# Patient Record
Sex: MALE | Race: WHITE | Employment: OTHER | URBAN - METROPOLITAN AREA
[De-identification: names, ages, dates, MRNs, and addresses within clinical notes are randomized per-mention and may not be internally consistent; named-entity substitution may affect disease eponyms.]

---

## 2021-03-08 ENCOUNTER — HOSPITAL ENCOUNTER (OUTPATIENT)
Dept: MRI IMAGING | Age: 61
Discharge: HOME | End: 2021-03-10
Payer: MEDICARE

## 2021-03-08 DIAGNOSIS — S06.5XAA SDH (SUBDURAL HEMATOMA): ICD-10-CM

## 2021-03-08 DIAGNOSIS — G91.2 NPH (NORMAL PRESSURE HYDROCEPHALUS) (HCC): ICD-10-CM

## 2021-03-08 PROCEDURE — 70553 MRI BRAIN STEM W/O & W/DYE: CPT

## 2021-03-08 PROCEDURE — A9585 GADOBUTROL INJECTION: HCPCS | Performed by: RADIOLOGY

## 2021-03-08 PROCEDURE — 6360000004 HC RX CONTRAST MEDICATION: Performed by: RADIOLOGY

## 2021-03-08 RX ADMIN — GADOBUTROL 20 ML: 604.72 INJECTION INTRAVENOUS at 14:49

## 2021-08-18 ENCOUNTER — HOSPITAL ENCOUNTER (OUTPATIENT)
Age: 61
Discharge: HOME | End: 2021-08-20

## 2021-08-18 PROCEDURE — 88305 TISSUE EXAM BY PATHOLOGIST: CPT

## 2021-10-29 ENCOUNTER — HOSPITAL ENCOUNTER (OUTPATIENT)
Age: 61
Discharge: HOME | End: 2021-10-31

## 2021-10-30 LAB — CLOTEST: NORMAL

## 2021-12-07 ENCOUNTER — TELEPHONE (OUTPATIENT)
Dept: CARDIOLOGY | Age: 61
End: 2021-12-07

## 2021-12-09 ENCOUNTER — TELEPHONE (OUTPATIENT)
Dept: CARDIOLOGY | Age: 61
End: 2021-12-09

## 2021-12-09 NOTE — TELEPHONE ENCOUNTER
Spoke with patient and confirmed Lexiscan stress test on Dec. 13, 2021 at 0700. Instructions for test and COVID-19 preprocedure checklist reviewed with patient, questions answered.

## 2021-12-13 ENCOUNTER — HOSPITAL ENCOUNTER (OUTPATIENT)
Dept: CARDIOLOGY | Age: 61
Discharge: HOME | End: 2021-12-13
Payer: MEDICARE

## 2021-12-13 VITALS
HEIGHT: 68 IN | WEIGHT: 242 LBS | BODY MASS INDEX: 36.68 KG/M2 | SYSTOLIC BLOOD PRESSURE: 112 MMHG | DIASTOLIC BLOOD PRESSURE: 62 MMHG

## 2021-12-13 DIAGNOSIS — R07.89 OTHER CHEST PAIN: Primary | ICD-10-CM

## 2021-12-13 PROCEDURE — 93017 CV STRESS TEST TRACING ONLY: CPT

## 2021-12-13 PROCEDURE — 6360000002 HC RX W HCPCS: Performed by: FAMILY MEDICINE

## 2021-12-13 PROCEDURE — A9500 TC99M SESTAMIBI: HCPCS | Performed by: INTERNAL MEDICINE

## 2021-12-13 PROCEDURE — 78452 HT MUSCLE IMAGE SPECT MULT: CPT

## 2021-12-13 PROCEDURE — 2580000003 HC RX 258: Performed by: INTERNAL MEDICINE

## 2021-12-13 PROCEDURE — 3430000000 HC RX DIAGNOSTIC RADIOPHARMACEUTICAL: Performed by: INTERNAL MEDICINE

## 2021-12-13 RX ORDER — SODIUM CHLORIDE 0.9 % (FLUSH) 0.9 %
10 SYRINGE (ML) INJECTION PRN
Status: DISCONTINUED | OUTPATIENT
Start: 2021-12-13 | End: 2021-12-14 | Stop reason: HOSPADM

## 2021-12-13 RX ADMIN — Medication 10.4 MILLICURIE: at 07:21

## 2021-12-13 RX ADMIN — SODIUM CHLORIDE, PRESERVATIVE FREE 10 ML: 5 INJECTION INTRAVENOUS at 07:21

## 2021-12-13 RX ADMIN — Medication 34 MILLICURIE: at 08:40

## 2021-12-13 RX ADMIN — SODIUM CHLORIDE, PRESERVATIVE FREE 10 ML: 5 INJECTION INTRAVENOUS at 08:41

## 2021-12-13 RX ADMIN — REGADENOSON 0.4 MG: 0.08 INJECTION, SOLUTION INTRAVENOUS at 08:40

## 2021-12-13 RX ADMIN — SODIUM CHLORIDE, PRESERVATIVE FREE 10 ML: 5 INJECTION INTRAVENOUS at 08:39

## 2022-01-21 ENCOUNTER — HOSPITAL ENCOUNTER (OUTPATIENT)
Dept: CARDIOLOGY | Age: 62
Discharge: HOME | End: 2022-01-21
Payer: MEDICARE

## 2022-01-21 LAB
LV EF: 30 %
LVEF MODALITY: NORMAL

## 2022-01-21 PROCEDURE — 93306 TTE W/DOPPLER COMPLETE: CPT | Performed by: PSYCHIATRY & NEUROLOGY

## 2022-01-21 PROCEDURE — 2580000003 HC RX 258: Performed by: FAMILY MEDICINE

## 2022-01-21 PROCEDURE — 6360000004 HC RX CONTRAST MEDICATION: Performed by: FAMILY MEDICINE

## 2022-01-21 RX ORDER — SODIUM CHLORIDE 0.9 % (FLUSH) 0.9 %
10 SYRINGE (ML) INJECTION PRN
Status: DISCONTINUED | OUTPATIENT
Start: 2022-01-21 | End: 2022-01-22 | Stop reason: HOSPADM

## 2022-01-21 RX ADMIN — PERFLUTREN 1.1 MG: 6.52 INJECTION, SUSPENSION INTRAVENOUS at 08:15

## 2022-01-21 RX ADMIN — SODIUM CHLORIDE, PRESERVATIVE FREE 10 ML: 5 INJECTION INTRAVENOUS at 08:15

## 2022-01-21 RX ADMIN — SODIUM CHLORIDE, PRESERVATIVE FREE 10 ML: 5 INJECTION INTRAVENOUS at 08:20

## 2022-03-10 ENCOUNTER — OFFICE VISIT (OUTPATIENT)
Dept: CARDIOLOGY CLINIC | Age: 62
End: 2022-03-10
Payer: MEDICARE

## 2022-03-10 VITALS
DIASTOLIC BLOOD PRESSURE: 76 MMHG | SYSTOLIC BLOOD PRESSURE: 124 MMHG | BODY MASS INDEX: 37.66 KG/M2 | RESPIRATION RATE: 18 BRPM | HEART RATE: 81 BPM | HEIGHT: 68 IN | WEIGHT: 248.5 LBS

## 2022-03-10 DIAGNOSIS — R94.39 ABNORMAL STRESS TEST: Primary | ICD-10-CM

## 2022-03-10 DIAGNOSIS — R07.89 OTHER CHEST PAIN: Primary | ICD-10-CM

## 2022-03-10 PROCEDURE — 93000 ELECTROCARDIOGRAM COMPLETE: CPT | Performed by: INTERNAL MEDICINE

## 2022-03-10 PROCEDURE — 99205 OFFICE O/P NEW HI 60 MIN: CPT | Performed by: INTERNAL MEDICINE

## 2022-03-10 RX ORDER — METOPROLOL SUCCINATE 50 MG/1
50 TABLET, EXTENDED RELEASE ORAL DAILY
Qty: 30 TABLET | Refills: 0 | Status: SHIPPED
Start: 2022-03-10 | End: 2022-05-10

## 2022-03-10 RX ORDER — METOPROLOL SUCCINATE 50 MG/1
50 TABLET, EXTENDED RELEASE ORAL DAILY
Qty: 30 TABLET | Refills: 5 | Status: SHIPPED
Start: 2022-03-10 | End: 2022-03-10 | Stop reason: SDUPTHER

## 2022-03-10 RX ORDER — ASPIRIN 81 MG/1
81 TABLET ORAL DAILY
Qty: 90 TABLET | Refills: 3 | Status: SHIPPED
Start: 2022-03-10 | End: 2022-04-12 | Stop reason: ALTCHOICE

## 2022-03-10 RX ORDER — FAMOTIDINE 40 MG/1
40 TABLET, FILM COATED ORAL DAILY
COMMUNITY

## 2022-03-10 RX ORDER — PANTOPRAZOLE SODIUM 40 MG/1
40 TABLET, DELAYED RELEASE ORAL DAILY
COMMUNITY
End: 2022-04-12

## 2022-03-10 NOTE — PATIENT INSTRUCTIONS
Your stress test and echocardiogram showed you likely had a heart attack in the past and have developed a cardiomyopathy (weakening of the heart muscle) likely due to blockages in the coronary arteries. This makes you prone to developing congestive heart failure. We will arrange a heart catheterization to see if you have blockages and how to best fix them.     We need to change your blood pressure and heart medications.  -Stop amlodipinebenazepril and benazepril pills  -Start metoprolol succinate 50 mg daily  -If you tolerate that, we will start Entresto  -You need to be on low-dose aspirin  -You need to quit smoking

## 2022-03-10 NOTE — PROGRESS NOTES
OUTPATIENT CARDIOLOGY CONSULT    Name: Tiarra Hammer    Age: 64 y.o. Date of Service: 3/10/2022    Reason for Consultation: Cardiomyopathy    Referring Physician: Stephanie Guerin MD    History of Present Illness:  Tiarra Hammer is a 64 y.o. male who presents today for further evaluation of newly discovered cardiomyopathy. Denies any known history of coronary artery disease but has a multitude of risk factors. Has chronic heartburn for years but denies exertional chest pain. Underwent stress test in December showing fixed inferior defect and EF in the 20s and subsequent echo showing inferior hypokinesis EF in the 30s. Denies any prior knowledge of treatment for MI. Reports exertional dyspnea but is not very ambulatory due to chronic neck and back issues. He gets heartburn daily, can be triggered by water but not typically with exertion. Denies palpitations, lower extremity edema, syncope. Smokes a pack of cigarettes daily. States is sensitive medications and intolerant to statin as well as aspirin. Review of Systems:  Complete review of systems otherwise negative except as described above. 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 .. HAS F/U ON 6/21/12 WITH DR. JIMENEZ    Hernia     Hypertension     Low back pain     Osteolytic lesion     Panic anxiety syndrome     Sleep apnea     awaiting [employer] back ordered       Past Surgical History:  Past Surgical History:   Procedure Laterality Date    BACK SURGERY  8/10/11    DR. Robbie Paz AT [facility]. .. FUSION    BRAIN SURGERY      CRANIECTOMY  3/22/10    RIGHT TEMPORAL REGION    CRANIOTOMY  5/10/10 [address] [facility]    DR. RIVERS       Family History:  Family History   Problem Relation Age of Onset    Heart Disease Mother     Prostate Cancer Father        Social History:  Social History     Tobacco Use    Smoking status: Current Every Day Smoker     Packs/day: 1.00     Types: Cigarettes    Smokeless tobacco: Never Used   Vaping Use    Vaping Use: Never used   Substance Use Topics    Alcohol use: Yes     Comment: DRINKS BEER WEEKLY; UNKNOWN AMOUNT    Drug use: No        Allergies:   Allergies   Allergen Reactions    Adhesive Tape Rash       Current Medications:    Current Outpatient Medications:     famotidine (PEPCID) 40 MG tablet, Take 40 mg by mouth daily, Disp: , Rfl:     pantoprazole (PROTONIX) 40 MG tablet, Take 40 mg by mouth daily, Disp: , Rfl:     metoprolol succinate (TOPROL XL) 50 MG extended release tablet, Take 1 tablet by mouth daily, Disp: 30 tablet, Rfl: 5    aspirin EC 81 MG EC tablet, Take 1 tablet by mouth daily, Disp: 90 tablet, Rfl: 3    tiZANidine (ZANAFLEX) 4 MG tablet, Take 4 mg by mouth every 6 hours as needed, Disp: , Rfl:     zolpidem (AMBIEN) 10 MG tablet, Take by mouth nightly as needed for Sleep., Disp: , Rfl:     metFORMIN (GLUCOPHAGE-XR) 500 MG extended release tablet, 1,000 mg daily (with breakfast) , Disp: , Rfl:     ezetimibe (ZETIA) 10 MG tablet, 10 mg daily , Disp: , Rfl:     Probiotic Product (SOLUBLE FIBER/PROBIOTICS PO), Take by mouth, Disp: , Rfl:     ranitidine (ZANTAC) 300 MG capsule, Take 300 mg by mouth every evening (Patient not taking: Reported on 3/10/2022), Disp: , Rfl:     Physical Exam:  /76   Pulse 81   Resp 18   Ht 5' 8\" (1.727 m)   Wt 248 lb 8 oz (112.7 kg)   BMI 37.78 kg/m²   Wt Readings from Last 3 Encounters:   03/10/22 248 lb 8 oz (112.7 kg)   12/13/21 242 lb (109.8 kg)   12/19/19 242 lb (109.8 kg)     Appearance: Overweight male, awake, alert, no acute respiratory distress  Skin: Intact, no rash  Eyes: EOMI, no conjunctival erythema  ENMT: Moist mucous membranes. Neck: Supple, no elevated JVP, no carotid bruits  Lungs: Clear to auscultation bilaterally. No wheezes, rales, or rhonchi. Cardiac: Regular rhythm with a normal rate. S1 & S2 normal, no murmurs  Abdomen: Soft, nontender, +bowel sounds  Extremities: Moves all extremities x 4, no lower extremity edema  Neurologic: No focal motor deficits apparent, normal mood and affect  Peripheral Pulses: Intact posterior tibial pulses bilaterally    Laboratory Tests:  Lab Results   Component Value Date    CREATININE 0.78 11/17/2017    BUN 8 11/17/2017     06/04/2013    K 4.2 06/04/2013     06/04/2013    CO2 27 06/04/2013     Lab Results   Component Value Date    MG 2.0 12/17/2010     Lab Results   Component Value Date    WBC 7.4 06/04/2013    HGB 16.5 06/04/2013    HCT 50.0 06/04/2013    MCV 99.7 06/04/2013     06/04/2013     Lab Results   Component Value Date    ALT 39 06/04/2013    AST 33 06/04/2013    ALKPHOS 74 06/04/2013    BILITOT 0.3 06/04/2013     Lab Results   Component Value Date    CKTOTAL 73 12/18/2010    CKMB 1.1 12/18/2010    TROPONINI <0.01 01/19/2015    TROPONINI 0.02 12/18/2010    TROPONINI 0.01 12/17/2010     Lab Results   Component Value Date    INR 1.0 04/21/2011    INR 1.0 12/17/2010    PROTIME 11.6 04/21/2011    PROTIME 11.4 12/17/2010     No results found for: TSHFT4, TSH  No results found for: LABA1C  No results found for: EAG  Lab Results   Component Value Date    CHOL 213 (H) 12/18/2010     Lab Results   Component Value Date    TRIG 191 (H) 12/18/2010     Lab Results   Component Value Date    HDL 40.0 (A) 12/18/2010     Lab Results   Component Value Date    LDLCALC 135 (H) 12/18/2010     No results found for: LABVLDL, VLDL  No results found for: CHOLHDLRATIO  No results for input(s): PROBNP in the last 72 hours. Cardiac Tests:  ECG:   3/10/2022: Sinus rhythm 81 beats minute with PACs.   Normal axis and intervals. Poor R wave progression possible prior anterior infarct. Nonspecific T wave changes. Echocardiogram:   TTE 1/21/22 Merry Odonnell   Summary   difficult visualization. Contrast used. moderate global hypokinesis. Inferior akinesis. Severely reduced left ventricular systolic function. EF 30%   Stage I diastolic dysfunction. Trace to mild mitral regurgitation. Stress test:    Pharm stress 12/13/21  Gated SPECT left ventricular ejection fraction was calculated to be 25%, with inferior hypokinesis.     Impression:    1. ECG during the infusion did not change. 2. The myocardial perfusion imaging was abnormal.    The abnormality was a a large sized fixed defect in the inferior wall suggestive of a prior MI     3. Overall left ventricular systolic function was abnormal with regional wall motion abnormalities. 4. Intermediate risk general pharmacologic stress test.      Cardiac catheterization: na    Orders Placed This Encounter   Procedures    EKG 12 Lead        Requested Prescriptions     Signed Prescriptions Disp Refills    metoprolol succinate (TOPROL XL) 50 MG extended release tablet 30 tablet 5     Sig: Take 1 tablet by mouth daily    aspirin EC 81 MG EC tablet 90 tablet 3     Sig: Take 1 tablet by mouth daily        ASSESSMENT / PLAN:  1. Cardiomyopathy, most likely ischemic.  EF 25% by SPECT, 30% by echo  2. Presumed CAD. Abnormal stress with fixed inferior defect, no ischemia  3. Chronic HFrEF. He is euvolemic  4. Hypertension, well controlled  5. Type 2 diabetes, on Metformin  6. Hyperlipidemia, on ezetimibe. Statin intolerant  7. NISH, untreated  8. History of cerebral meningioma status post surgical resection  9. GERD  10. Chronic neck pain, back pain  11. Obesity  12.  Tobacco abuse    Recommendations:  Patient presenting with new onset of cardiomyopathy with no prior knowledge of a prior MI.  EF significantly reduced, he does not appear hypervolemic and denies anginal symptoms though he is quite sedentary, and his dyspnea with exertion or \"heartburn\" may be anginal equivalents. · Recommend left heart catheterization and coronary angiogram to confirm ischemic cardiomyopathy and to assess suitability for revascularization  · Optimize guideline directed medical therapy for cardiomyopathy; he is very hesitant to change any medications due to intolerance, thus will proceed slowly  · Discontinue amlodipine benazepril and benazepril  · Start metoprolol succinate 50 mg daily  · If tolerates, will start sacubitril/valsartan  · Currently euvolemic, diuretics as needed  · Start baby dose enteric-coated aspirin  · Recommend statin, however patient intolerant  · Needs to quit smoking  · Increase physical activity as able  · Aggressive risk factor modification  · Follow-up in 1 month or sooner if need arises    Risks and benefits of left heart catheterization explained to patient, including risk of MI, CVA, death, bleeding complications, vascular injury, renal failure requiring dialysis, and requiring emergency surgery. Possible outcomes including need for medical management, PCI, bypass surgery explained to patient. Patient voiced understanding and agrees to proceed. AUC criteria: HF 7    Greater than 60 minutes spent on this encounter. Above explained in detail to the patient and all questions answered    Thank you Edvin for allowing me to participate in your patient's care. Please feel free to contact me if you have any questions or concerns.     Brittany Huber MD, [facility] SPECIALTY \[facility]\"" [employer] [facility] Cardiology

## 2022-03-15 ENCOUNTER — TELEPHONE (OUTPATIENT)
Dept: CARDIAC CATH/INVASIVE PROCEDURES | Age: 62
End: 2022-03-15

## 2022-03-15 DIAGNOSIS — R07.89 OTHER CHEST PAIN: ICD-10-CM

## 2022-03-15 LAB
ANION GAP SERPL CALCULATED.3IONS-SCNC: 13 MMOL/L (ref 7–16)
BASOPHILS ABSOLUTE: 0.04 E9/L (ref 0–0.2)
BASOPHILS RELATIVE PERCENT: 0.5 % (ref 0–2)
BUN BLDV-MCNC: 12 MG/DL (ref 6–23)
CALCIUM SERPL-MCNC: 9.2 MG/DL (ref 8.6–10.2)
CHLORIDE BLD-SCNC: 96 MMOL/L (ref 98–107)
CO2: 24 MMOL/L (ref 22–29)
CREAT SERPL-MCNC: 0.8 MG/DL (ref 0.7–1.2)
EOSINOPHILS ABSOLUTE: 0.12 E9/L (ref 0.05–0.5)
EOSINOPHILS RELATIVE PERCENT: 1.5 % (ref 0–6)
GFR AFRICAN AMERICAN: >60
GFR NON-AFRICAN AMERICAN: >60 ML/MIN/1.73
GLUCOSE BLD-MCNC: 211 MG/DL (ref 74–99)
HCT VFR BLD CALC: 45.9 % (ref 37–54)
HEMOGLOBIN: 15.3 G/DL (ref 12.5–16.5)
IMMATURE GRANULOCYTES #: 0.07 E9/L
IMMATURE GRANULOCYTES %: 0.9 % (ref 0–5)
INR BLD: 1.1
LYMPHOCYTES ABSOLUTE: 1.99 E9/L (ref 1.5–4)
LYMPHOCYTES RELATIVE PERCENT: 25.1 % (ref 20–42)
MCH RBC QN AUTO: 32.9 PG (ref 26–35)
MCHC RBC AUTO-ENTMCNC: 33.3 % (ref 32–34.5)
MCV RBC AUTO: 98.7 FL (ref 80–99.9)
MONOCYTES ABSOLUTE: 0.66 E9/L (ref 0.1–0.95)
MONOCYTES RELATIVE PERCENT: 8.3 % (ref 2–12)
NEUTROPHILS ABSOLUTE: 5.04 E9/L (ref 1.8–7.3)
NEUTROPHILS RELATIVE PERCENT: 63.7 % (ref 43–80)
PDW BLD-RTO: 12.4 FL (ref 11.5–15)
PLATELET # BLD: 246 E9/L (ref 130–450)
PMV BLD AUTO: 11 FL (ref 7–12)
POTASSIUM SERPL-SCNC: 4.4 MMOL/L (ref 3.5–5)
PROTHROMBIN TIME: 11.7 SEC (ref 9.3–12.4)
RBC # BLD: 4.65 E12/L (ref 3.8–5.8)
SODIUM BLD-SCNC: 133 MMOL/L (ref 132–146)
WBC # BLD: 7.9 E9/L (ref 4.5–11.5)

## 2022-03-15 NOTE — TELEPHONE ENCOUNTER
Reminded patient of scheduled procedure on 3/16. Instructions given and COVID questionnaire completed.

## 2022-03-16 ENCOUNTER — HOSPITAL ENCOUNTER (INPATIENT)
Dept: CARDIAC CATH/INVASIVE PROCEDURES | Age: 62
LOS: 1 days | Discharge: HOME | DRG: 247 | End: 2022-03-17
Attending: INTERNAL MEDICINE | Admitting: INTERNAL MEDICINE
Payer: MEDICARE

## 2022-03-16 DIAGNOSIS — I25.10 CAD IN NATIVE ARTERY: ICD-10-CM

## 2022-03-16 LAB
ABO/RH: NORMAL
ANTIBODY SCREEN: NORMAL
METER GLUCOSE: 100 MG/DL (ref 74–99)
POC ACT LR: 223 SECONDS
POC ACT LR: 245 SECONDS
POC ACT LR: 263 SECONDS
POC ACT LR: 302 SECONDS

## 2022-03-16 PROCEDURE — 92928 PRQ TCAT PLMT NTRAC ST 1 LES: CPT | Performed by: INTERNAL MEDICINE

## 2022-03-16 PROCEDURE — 6370000000 HC RX 637 (ALT 250 FOR IP)

## 2022-03-16 PROCEDURE — 4A023N7 MEASUREMENT OF CARDIAC SAMPLING AND PRESSURE, LEFT HEART, PERCUTANEOUS APPROACH: ICD-10-PCS | Performed by: INTERNAL MEDICINE

## 2022-03-16 PROCEDURE — C1769 GUIDE WIRE: HCPCS

## 2022-03-16 PROCEDURE — B2111ZZ FLUOROSCOPY OF MULTIPLE CORONARY ARTERIES USING LOW OSMOLAR CONTRAST: ICD-10-PCS | Performed by: INTERNAL MEDICINE

## 2022-03-16 PROCEDURE — C1874 STENT, COATED/COV W/DEL SYS: HCPCS

## 2022-03-16 PROCEDURE — 86901 BLOOD TYPING SEROLOGIC RH(D): CPT

## 2022-03-16 PROCEDURE — C1725 CATH, TRANSLUMIN NON-LASER: HCPCS

## 2022-03-16 PROCEDURE — 2580000003 HC RX 258: Performed by: INTERNAL MEDICINE

## 2022-03-16 PROCEDURE — 6370000000 HC RX 637 (ALT 250 FOR IP): Performed by: INTERNAL MEDICINE

## 2022-03-16 PROCEDURE — 36415 COLL VENOUS BLD VENIPUNCTURE: CPT

## 2022-03-16 PROCEDURE — C9607 PERC D-E COR REVASC CHRO SIN: HCPCS

## 2022-03-16 PROCEDURE — 2140000000 HC CCU INTERMEDIATE R&B

## 2022-03-16 PROCEDURE — 93458 L HRT ARTERY/VENTRICLE ANGIO: CPT | Performed by: INTERNAL MEDICINE

## 2022-03-16 PROCEDURE — 6360000002 HC RX W HCPCS

## 2022-03-16 PROCEDURE — 86850 RBC ANTIBODY SCREEN: CPT

## 2022-03-16 PROCEDURE — C1887 CATHETER, GUIDING: HCPCS

## 2022-03-16 PROCEDURE — C1894 INTRO/SHEATH, NON-LASER: HCPCS

## 2022-03-16 PROCEDURE — C9608 PERC D-E COR REVASC CHRO ADD: HCPCS

## 2022-03-16 PROCEDURE — 93458 L HRT ARTERY/VENTRICLE ANGIO: CPT

## 2022-03-16 PROCEDURE — 82962 GLUCOSE BLOOD TEST: CPT

## 2022-03-16 PROCEDURE — 86900 BLOOD TYPING SEROLOGIC ABO: CPT

## 2022-03-16 PROCEDURE — 2500000003 HC RX 250 WO HCPCS

## 2022-03-16 PROCEDURE — 85347 COAGULATION TIME ACTIVATED: CPT

## 2022-03-16 PROCEDURE — 2709999900 HC NON-CHARGEABLE SUPPLY

## 2022-03-16 PROCEDURE — 027236Z DILATION OF CORONARY ARTERY, THREE ARTERIES WITH THREE DRUG-ELUTING INTRALUMINAL DEVICES, PERCUTANEOUS APPROACH: ICD-10-PCS | Performed by: INTERNAL MEDICINE

## 2022-03-16 PROCEDURE — 93571 IV DOP VEL&/PRESS C FLO 1ST: CPT

## 2022-03-16 PROCEDURE — 93571 IV DOP VEL&/PRESS C FLO 1ST: CPT | Performed by: INTERNAL MEDICINE

## 2022-03-16 RX ORDER — ASPIRIN 81 MG/1
324 TABLET, CHEWABLE ORAL ONCE
Status: DISCONTINUED | OUTPATIENT
Start: 2022-03-16 | End: 2022-03-16

## 2022-03-16 RX ORDER — ASPIRIN 81 MG/1
81 TABLET ORAL DAILY
Status: DISCONTINUED | OUTPATIENT
Start: 2022-03-17 | End: 2022-03-17 | Stop reason: HOSPADM

## 2022-03-16 RX ORDER — SODIUM CHLORIDE 9 MG/ML
INJECTION, SOLUTION INTRAVENOUS ONCE
Status: COMPLETED | OUTPATIENT
Start: 2022-03-16 | End: 2022-03-16

## 2022-03-16 RX ORDER — ZOLPIDEM TARTRATE 5 MG/1
5 TABLET ORAL NIGHTLY PRN
Status: DISCONTINUED | OUTPATIENT
Start: 2022-03-16 | End: 2022-03-17 | Stop reason: HOSPADM

## 2022-03-16 RX ORDER — METOPROLOL SUCCINATE 50 MG/1
50 TABLET, EXTENDED RELEASE ORAL DAILY
Status: DISCONTINUED | OUTPATIENT
Start: 2022-03-17 | End: 2022-03-17 | Stop reason: HOSPADM

## 2022-03-16 RX ORDER — OXYCODONE HYDROCHLORIDE AND ACETAMINOPHEN 5; 325 MG/1; MG/1
1 TABLET ORAL EVERY 4 HOURS PRN
Status: DISCONTINUED | OUTPATIENT
Start: 2022-03-16 | End: 2022-03-17 | Stop reason: HOSPADM

## 2022-03-16 RX ORDER — PANTOPRAZOLE SODIUM 40 MG/1
40 TABLET, DELAYED RELEASE ORAL DAILY
Status: DISCONTINUED | OUTPATIENT
Start: 2022-03-16 | End: 2022-03-17 | Stop reason: HOSPADM

## 2022-03-16 RX ORDER — ASPIRIN 81 MG/1
324 TABLET, CHEWABLE ORAL ONCE
Status: ON HOLD | COMMUNITY
End: 2022-03-17 | Stop reason: HOSPADM

## 2022-03-16 RX ORDER — ROSUVASTATIN CALCIUM 20 MG/1
40 TABLET, COATED ORAL NIGHTLY
Status: DISCONTINUED | OUTPATIENT
Start: 2022-03-16 | End: 2022-03-17 | Stop reason: HOSPADM

## 2022-03-16 RX ORDER — EZETIMIBE 10 MG/1
10 TABLET ORAL DAILY
Status: DISCONTINUED | OUTPATIENT
Start: 2022-03-17 | End: 2022-03-17 | Stop reason: HOSPADM

## 2022-03-16 RX ORDER — FAMOTIDINE 20 MG/1
40 TABLET, FILM COATED ORAL DAILY
Status: DISCONTINUED | OUTPATIENT
Start: 2022-03-16 | End: 2022-03-17 | Stop reason: HOSPADM

## 2022-03-16 RX ORDER — SODIUM CHLORIDE 9 MG/ML
INJECTION, SOLUTION INTRAVENOUS CONTINUOUS
Status: ACTIVE | OUTPATIENT
Start: 2022-03-16 | End: 2022-03-17

## 2022-03-16 RX ORDER — ACETAMINOPHEN 325 MG/1
650 TABLET ORAL EVERY 4 HOURS PRN
Status: DISCONTINUED | OUTPATIENT
Start: 2022-03-16 | End: 2022-03-17 | Stop reason: HOSPADM

## 2022-03-16 RX ADMIN — SODIUM CHLORIDE: 9 INJECTION, SOLUTION INTRAVENOUS at 13:00

## 2022-03-16 RX ADMIN — ZOLPIDEM TARTRATE 5 MG: 5 TABLET ORAL at 21:45

## 2022-03-16 RX ADMIN — SODIUM CHLORIDE: 9 INJECTION, SOLUTION INTRAVENOUS at 07:23

## 2022-03-16 RX ADMIN — FAMOTIDINE 40 MG: 20 TABLET, FILM COATED ORAL at 16:20

## 2022-03-16 RX ADMIN — ROSUVASTATIN 40 MG: 20 TABLET, FILM COATED ORAL at 23:08

## 2022-03-16 RX ADMIN — OXYCODONE AND ACETAMINOPHEN 1 TABLET: 5; 325 TABLET ORAL at 23:08

## 2022-03-16 RX ADMIN — PANTOPRAZOLE SODIUM 40 MG: 40 TABLET, DELAYED RELEASE ORAL at 16:20

## 2022-03-16 RX ADMIN — TICAGRELOR 90 MG: 90 TABLET ORAL at 21:45

## 2022-03-16 ASSESSMENT — PAIN SCALES - GENERAL: PAINLEVEL_OUTOF10: 5

## 2022-03-16 NOTE — PROCEDURES
Procedure:    1. Left heart cath  2. Percutaneous coronary artery intervention of the mid LAD with a 2.5 x 22 mm drug-eluting stent. 3.  Percutaneous coronary artery intervention of the OM2 with a 2.0 x 12 mm drug-eluting stent. 4.  Percutaneous coronary intervention of the mid RCA with a 3.25 x 15 mm drug-eluting stent. 5.  iFR of the ostial LAD. Complications: None    Physician: Julius Floyd DO. Assistant: gertrudis    Indication: Heart failure, abnormal stress test  AUC: 7  AUC indication: HF    PCI AUC: 7  PCI AUC incication: 16    Anesthesia: 2% Xylocaine, intravenous fentanyl     Sedation: Intravenous Versed    Sedation time: I was present for sedation administration at 07 52. I ended sedation at 09 36 for a total face-to-face sedation time of 1 hour and 36 minutes. .    Estimated blood loss: Minimal    Specimens: none    Contrast used: 255 cc    Hemodynamics:  Opening Aortic pressure: 816/64  LV systolic pressure: 629  LVEDP: 17  No significant gradient across the aortic valve. IFR of the ostial LAD: 0.95, 0.95, 0.94. Angiographic Results/findings:  Left Main: No angiographically significant stenosis. LAD: Ostial diffuse eccentric 50% stenosis. Mid long diffuse 80% stenosis. D1: Mild luminal irregularities. D2: No angiographically significant stenosis. Juve Case Cx: Codominant vessel. No angiographically significant stenosis. .  OM1: Large vessel. Mid mild luminal irregularities. There is a small lateral branch that is a 1.8 to 2 mm vessel. This has an ostial 99% subtotal occlusion. Not amenable to PCI. Juve Case OM2: 2.0 mm vessel. Mid diffuse 90% stenosis. OM 3: Tiny vessel. OM 4: No angiographically significant stenosis. OM 5: (PDA) no angiographically significant stenosis. .  Ramus: Absent. RCA: Mid diffuse eccentric 80% stenosis. .  PDA: Off of the circumflex as above. .  PLB: No angiographically significant stenosis. .    Interventional results:  Mid LAD lesion  PrePCI AGUSTNI 3 flow.   Successful predilatation of the mid LAD lesion with a 2.0 mm balloon. This lesion was then crossed and stented with a 2.5 x 22 mm drug-eluting stent to 17 atmospheres of pressure. This resulted in 0 percent residual stenosis with AGUSTIN-3 flow. OM2 lesion  PrePCI AGUSTIN 3 flow. Successful predilatation of the OM2 lesion with a 2.0 mm balloon. This lesion was then crossed and stented with a 2.0 x 12 mm drug-eluting stent to 14 atmospheres of pressure. This resulted in 0 percent residual stenosis with AGUSTIN-3 flow. RCA lesion  Pre-PCI AGUSTIN-3 flow. Predilatation with a 3.0 mm balloon. Successful stenting of the mid RCA lesion with a 3.25 x 15 mm drug-eluting stent. This was postdilated with a 3.25 mm noncompliant balloon to 18 shannon pressure. This resulted in 0% residual stenosis and AGUSTIN-3 flow. Summary of the procedure:  After obtaining informed consent they were taken to the cardiac Cath Lab where the area over the right radial artery was prepped and draped in a sterile fashion. Using ultrasound guidance and a micropuncture technique a 6 Gambian slender rain sheath was placed in the right radial artery. This was aspirated & flushed several times throughout the procedure. This was medicated with verapamil and nitroglycerin. A 5 f TIG diagnostic catheter was advanced over a wire to the root of the aorta. It was aspirated & flushed with saline. Pressures were obtained. This was then filled with contrast.  This was then manipulated into the left main coronary artery. 4 orthogonal views were obtained. A 5 f TIG diagnostic catheter was advanced & manipulated into the RCA using the same technique. 3 orthogonal views were then obtained. An L3.5 catheter was then used and an SEQUEIRA cranial view obtained of the LAD. A 5 f angled pigtail was then advanced & manipulated into the LV. This was then aspirated & flushed with saline & pressures were obtained. An SEQUEIRA view was then obtained.   The catheter was then aspirated & flushed with saline once again & pull back pressures were then obtained across the aortic vlave. They were then anticoagulated with heparin to maintain an ACT greater than 250. A 6 f EBU 3.5 guiding catheter was used. They were already on ASA & they were loaded with Brilinta. An IFR wire was prepped outside the body. This was then advanced and normalized at the tip of the guide. This was then advanced beyond the ostial LAD stenosis. 3 IFR's were performed. These were not hemodynamically significant. Therefore the wire was removed. Multiple attempts and reshaping a luge wire were unsuccessful in crossing the severe tortuosity in the ostial LAD. A floppy wire was attempted and was also unsuccessful. The IFR wire was then reused and advanced beyond the mid LAD stenosis. A 2.0 x 12 mm over-the-wire balloon was then advanced past the mid LAD lesion. The IFR wire was removed and exchanged for a luge wire. This was then advanced further into the apical LAD. The balloon was then pulled back and the mid LAD stenosis was then dilated with a 2.0 mm balloon. This lesion was then crossed and stented with a 2.5 x 22 mm drug-eluting stent to 17 shannon of pressure. This resulted in 0% residual stenosis and excellent AGUSTIN-3 flow. A luge wire was advanced across the OM2 lesion and placed in the distal vessel. The lesion was then crossed and predilated with a 2.0 mm balloon. The lesion was then crossed and stented with a 2.0 x 12 mm drug-eluting stent inflated to 14 atmospheres of pressure. This resulted in 0 percent residual stenosis AGUSTIN 3 flow. A 6 Arabic R4 guiding catheter was then advanced and manipulated into the right coronary artery. A luge wire was successfully manipulated across the mid RCA stenosis. This lesion was then predilated with a 3.0 mm balloon. This lesion was then crossed and stented with a 3.25 x 15 mm drug-eluting stent to 16 shannon pressure.   This was then postdilated with a 3.25 mm noncompliant balloon x2 inflations to 18 shannon pressure. The balloon and wire were removed. Follow-up angiogram demonstrated distal spasming which was treated with nitroglycerin and completely resolved. The balloon and wire were removed. A follow-up angiogram performed. This demonstrated 0% residual stenosis and AGUSTIN-3 flow. The radial sheath was removed and vas band placed with good patent hemostasis. They tolerated the procedure well with no complications. Note: This report was completed using computerized voice recognition software. Every effort has been made to ensure accuracy, however; and invert and computerized transcription errors may be present.

## 2022-03-16 NOTE — H&P
[address]                  [address] [facility],  [facility]                              HISTORY AND PHYSICAL    PATIENT NAME: Raghu De Luna                     :        1960  MED REC NO:   [id number]                            ROOM:       [id number]  ACCOUNT NO:   [de-identified]                           ADMIT DATE: 2022  PROVIDER:     Chhaya Casrto DO    CHIEF COMPLAINT:  Coronary artery disease. HISTORY OF PRESENT ILLNESS:  The patient is a 80-year-old  male  who is admitted to the hospital after undergoing heart cath with PCI to  the mid LAD, OM2 and mid RCA with drug-eluting stents per Dr. Aimee Capone  for evaluation of cardiomyopathy and abnormal stress test.    PAST MEDICAL HISTORY:  Meningioma, status post surgery; GERD;  hypertension; arthritis; diabetes mellitus type 2; hyperlipidemia. PAST SURGICAL HISTORY:  Low back surgery, brain surgery, bilateral groin  hernia repair. SOCIAL HISTORY:  The patient quit tobacco five days ago. Drinks  alcohol, two six-packs of beer a week. PRIMARY CARE PROVIDER:  Chuy Galvan MD    REVIEW OF SYSTEMS:  Remarkable for above-stated chief complaint plus  allergy to ADHESIVE TAPE. MEDICATIONS PRIOR TO ADMISSION:  Aspirin, famotidine, Protonix, Toprol,  Zanaflex, Ambien, Glucophage XL, Zetia, probiotic. PHYSICAL EXAMINATION:  GENERAL APPEARANCE:  Physical exam reveals a 80-year-old  male  who is alert, cooperative and a fair historian. VITAL SIGNS:  On admission temperature 97.1, pulse 92, respirations 18,  blood pressure 165/104 and repeat 136/88. HEENT:  Head:  Normocephalic, atraumatic. Eyes:  Pupils equal and  reactive to light. Extraocular muscles intact. Fundi not well  visualized. Nose:  No obstruction, polyp or discharge noted. Mouth:   Mucosa without lesion. Teeth, edentulous. Pharynx:  Noninjected  without exudate. NECK:  Supple. No JVD. No thyromegaly.   No carotid bruits. HEART:  Regular rate and rhythm without murmur. LUNGS:  Clear to auscultation bilaterally. ABDOMEN:  Positive bowel sounds, soft, nontender. No rebound, no  guarding, no hepatosplenomegaly, no masses. BACK:  With increased thoracic kyphosis. EXTREMITIES:  Without edema. LYMPH NODES:  No adenopathy noted. SKIN:  Without rash or lesion. IMPRESSION:  Coronary artery disease, status post heart cath with  drug-eluting stent x3; cardiomyopathy; osteoarthritis; GERD; diabetes  mellitus type 2; hypertension; hyperlipidemia; obesity; former tobacco  abuse; obstructive sleep apnea, not on CPAP. PLAN:  Continue post heart cath stent care. Continue as per Cardiology. Cardiac telemetry. Discharge plan, home when stable.         Jas Crump DO    D: 03/16/2022 15:28:45       T: 03/16/2022 15:31:11     [email or web address]  Job#: [id number]     Doc#: [id number]    CC:

## 2022-03-17 VITALS
TEMPERATURE: 97.3 F | BODY MASS INDEX: 36.37 KG/M2 | DIASTOLIC BLOOD PRESSURE: 82 MMHG | OXYGEN SATURATION: 97 % | HEIGHT: 68 IN | WEIGHT: 240 LBS | HEART RATE: 79 BPM | RESPIRATION RATE: 19 BRPM | SYSTOLIC BLOOD PRESSURE: 122 MMHG

## 2022-03-17 LAB
ANION GAP SERPL CALCULATED.3IONS-SCNC: 11 MMOL/L (ref 7–16)
BUN BLDV-MCNC: 10 MG/DL (ref 6–23)
CALCIUM SERPL-MCNC: 9 MG/DL (ref 8.6–10.2)
CHLORIDE BLD-SCNC: 101 MMOL/L (ref 98–107)
CO2: 24 MMOL/L (ref 22–29)
CREAT SERPL-MCNC: 0.7 MG/DL (ref 0.7–1.2)
GFR AFRICAN AMERICAN: >60
GFR NON-AFRICAN AMERICAN: >60 ML/MIN/1.73
GLUCOSE BLD-MCNC: 182 MG/DL (ref 74–99)
POTASSIUM SERPL-SCNC: 4.3 MMOL/L (ref 3.5–5)
SODIUM BLD-SCNC: 136 MMOL/L (ref 132–146)

## 2022-03-17 PROCEDURE — 36415 COLL VENOUS BLD VENIPUNCTURE: CPT

## 2022-03-17 PROCEDURE — 99231 SBSQ HOSP IP/OBS SF/LOW 25: CPT | Performed by: INTERNAL MEDICINE

## 2022-03-17 PROCEDURE — 6370000000 HC RX 637 (ALT 250 FOR IP): Performed by: INTERNAL MEDICINE

## 2022-03-17 PROCEDURE — 93005 ELECTROCARDIOGRAM TRACING: CPT

## 2022-03-17 PROCEDURE — 80048 BASIC METABOLIC PNL TOTAL CA: CPT

## 2022-03-17 RX ORDER — ROSUVASTATIN CALCIUM 40 MG/1
40 TABLET, COATED ORAL NIGHTLY
Qty: 30 TABLET | Refills: 0 | Status: SHIPPED | OUTPATIENT
Start: 2022-03-17

## 2022-03-17 RX ORDER — METFORMIN HYDROCHLORIDE 500 MG/1
1000 TABLET, EXTENDED RELEASE ORAL
Qty: 30 TABLET | Refills: 3
Start: 2022-03-18

## 2022-03-17 RX ADMIN — ASPIRIN 81 MG: 81 TABLET, COATED ORAL at 09:09

## 2022-03-17 RX ADMIN — PANTOPRAZOLE SODIUM 40 MG: 40 TABLET, DELAYED RELEASE ORAL at 09:10

## 2022-03-17 RX ADMIN — TICAGRELOR 90 MG: 90 TABLET ORAL at 09:10

## 2022-03-17 RX ADMIN — METOPROLOL SUCCINATE 50 MG: 50 TABLET, EXTENDED RELEASE ORAL at 09:09

## 2022-03-17 RX ADMIN — EZETIMIBE 10 MG: 10 TABLET ORAL at 09:09

## 2022-03-17 RX ADMIN — FAMOTIDINE 40 MG: 20 TABLET, FILM COATED ORAL at 09:10

## 2022-03-17 ASSESSMENT — PAIN SCALES - GENERAL
PAINLEVEL_OUTOF10: 0

## 2022-03-17 NOTE — PROGRESS NOTES
Discharge instructions provided to patient and partner. Information regarding medications, care of the radial cardiac cath site, and follow up appointments given. IVs removed and dressings applied. Monitor removed, cleaned, and returned to nurses station. Brilinta delivered to patient and verified. Coupon sent with patient. Patient opted to walk self to main lobby at this time. Left this floor in stable condition with all belongings.

## 2022-03-17 NOTE — PROGRESS NOTES
Hospital Medicine    Subjective:  Pt alert conversive no cp or sob      Current Facility-Administered Medications:     acetaminophen (TYLENOL) tablet 650 mg, 650 mg, Oral, Q4H PRN, Arletha Nicho, DO    ticagrelor [facility]) tablet 90 mg, 90 mg, Oral, BID, Arletha Nicho, DO, 90 mg at 03/16/22 2145    rosuvastatin (CRESTOR) tablet 40 mg, 40 mg, Oral, Nightly, Arletha Nicho, DO, 40 mg at 03/16/22 2308    oxyCODONE-acetaminophen (PERCOCET) 5-325 MG per tablet 1 tablet, 1 tablet, Oral, Q4H PRN, Arletha Nicho, DO, 1 tablet at 03/16/22 2308    ezetimibe (ZETIA) tablet 10 mg, 10 mg, Oral, Daily, Arletha Nicho, DO    zolpidem (AMBIEN) tablet 5 mg, 5 mg, Oral, Nightly PRN, Arletha Nicho, DO, 5 mg at 03/16/22 2145    famotidine (PEPCID) tablet 40 mg, 40 mg, Oral, Daily, Arletha Nicho, DO, 40 mg at 03/16/22 1620    pantoprazole (PROTONIX) tablet 40 mg, 40 mg, Oral, Daily, Arletha Nicho, DO, 40 mg at 03/16/22 1620    aspirin EC tablet 81 mg, 81 mg, Oral, Daily, Arletha Nicho, DO    metoprolol succinate (TOPROL XL) extended release tablet 50 mg, 50 mg, Oral, Daily, Arletha Nicho, DO    Objective:    /73   Pulse 72   Temp 96.4 °F (35.8 °C) (Temporal)   Resp 18   Ht 5' 8\" (1.727 m)   Wt 240 lb (108.9 kg)   SpO2 96%   BMI 36.49 kg/m²     Heart:  reg  Lungs:  ctab  Abd: + bs soft nontender  Extrem:  W/o edema    CBC with Differential:    Lab Results   Component Value Date    WBC 7.9 03/15/2022    RBC 4.65 03/15/2022    HGB 15.3 03/15/2022    HCT 45.9 03/15/2022     03/15/2022    MCV 98.7 03/15/2022    MCH 32.9 03/15/2022    MCHC 33.3 03/15/2022    RDW 12.4 03/15/2022    SEGSPCT 63 04/21/2011    LYMPHOPCT 25.1 03/15/2022    MONOPCT 8.3 03/15/2022    BASOPCT 0.5 03/15/2022    MONOSABS 0.66 03/15/2022    LYMPHSABS 1.99 03/15/2022    EOSABS 0.12 03/15/2022    BASOSABS 0.04 03/15/2022     CMP:    Lab Results   Component Value Date     03/15/2022    K 4.4 03/15/2022    CL 96 03/15/2022    CO2 24 03/15/2022    BUN 12 03/15/2022    CREATININE 0.8 03/15/2022    GFRAA >60 03/15/2022    LABGLOM >60 03/15/2022    GLUCOSE 211 03/15/2022    GLUCOSE 78 04/21/2011    PROT 7.9 06/04/2013    LABALBU 4.8 06/04/2013    LABALBU 4.5 01/05/2011    CALCIUM 9.2 03/15/2022    BILITOT 0.3 06/04/2013    ALKPHOS 74 06/04/2013    AST 33 06/04/2013    ALT 39 06/04/2013     Warfarin PT/INR:    Lab Results   Component Value Date    INR 1.1 03/15/2022    INR 1.0 04/21/2011    INR 1.0 12/17/2010    PROTIME 11.7 03/15/2022    PROTIME 11.6 04/21/2011    PROTIME 11.4 12/17/2010       Assessment:    Principal Problem:    CAD in native artery  Resolved Problems:    * No resolved hospital problems.  *      Plan:  Dc home        [address], DO  7:22 AM  3/17/2022

## 2022-03-17 NOTE — PROGRESS NOTES
PROGRESS NOTE     CARDIOLOGY    Chief complaint: Seen today for follow up, management & recommendations for coronary artery disease, cardiomyopathy. He denies chest pain or shortness of breath today. He was ambulating around the room. He was comfortable and in no distress. Wt Readings from Last 3 Encounters:   03/16/22 240 lb (108.9 kg)   03/10/22 248 lb 8 oz (112.7 kg)   12/13/21 242 lb (109.8 kg)     Temp Readings from Last 3 Encounters:   03/17/22 97.3 °F (36.3 °C)   12/19/19 96.6 °F (35.9 °C)   12/11/18 97.2 °F (36.2 °C)     BP Readings from Last 3 Encounters:   03/17/22 122/82   03/10/22 124/76   12/13/21 112/62     Pulse Readings from Last 3 Encounters:   03/17/22 79   03/10/22 81   12/19/19 104         Intake/Output Summary (Last 24 hours) at 3/17/2022 1747  Last data filed at 3/17/2022 0750  Gross per 24 hour   Intake 1213.08 ml   Output 2125 ml   Net -911.92 ml       Recent Labs     03/15/22  0848   WBC 7.9   HGB 15.3   HCT 45.9   MCV 98.7        Recent Labs     03/15/22  0848 03/17/22  0609    136   K 4.4 4.3   CL 96* 101   CO2 24 24   BUN 12 10   CREATININE 0.8 0.7     Recent Labs     03/15/22  0848   PROTIME 11.7   INR 1.1     No results for input(s): CKTOTAL, CKMB, CKMBINDEX, TROPONINI in the last 72 hours. No results for input(s): BNP in the last 72 hours. No results for input(s): CHOL, HDL, TRIG in the last 72 hours. Invalid input(s): CHOLHDLR, LDLCALCU  No results for input(s): TROPHS in the last 72 hours.       acetaminophen (TYLENOL) tablet 650 mg, Q4H PRN  ticagrelor (BRILINTA) tablet 90 mg, BID  rosuvastatin (CRESTOR) tablet 40 mg, Nightly  oxyCODONE-acetaminophen (PERCOCET) 5-325 MG per tablet 1 tablet, Q4H PRN  ezetimibe (ZETIA) tablet 10 mg, Daily  zolpidem (AMBIEN) tablet 5 mg, Nightly PRN  famotidine (PEPCID) tablet 40 mg, Daily  pantoprazole (PROTONIX) tablet 40 mg, Daily  aspirin EC tablet 81 mg, Daily  metoprolol succinate (TOPROL XL) extended release tablet 50 mg, Daily        Review of systems:     Heart: as above   Lungs: as above   Eyes: denies changes in vision or discharge. Ears: denies changes in hearing or pain. Nose: denies epistaxis or masses   Throat: denies sore throat or trouble swallowing. Neuro: denies numbness, tingling, tremors. Skin: denies rashes or itching. : denies hematuria, dysuria   GI: denies vomiting, diarrhea   Psych: denies mood changed, anxiety, depression. Physical exam:    Constitutional: A&O x3, communicates well, no acute distress. Eyes: extraocular muscles intact, PERRL. Normal lids & conjunctiva. No icterus. ENT: clear, no bleeding. No external masses. Lips normal formation. Neck: supple, full ROM, no JVD, no bruits, no lymphadenopathy. No masses. trachea midline. Heart: regular rate & rhythm, normal S1 & S2, no abnormal murmurs. No heave. Lungs: CTA. No accessory muscles. Abd: soft, non-tender. Normal bowel sounds. Obese. Neuro: Full ROM X 4, EOMI, no tremors. EXT: No bilateral lower extremity edema  Skin: warm, dry, intact. Good turgor. Psych: A&O x 3, normal behavior, not anxious. Radial access site: No complications. Assessment/Recommendations  1. Coronary artery disease. 2.5 x 22 mm drug-eluting stent to the mid LAD, 2.0 x 12 mm drug-eluting stent to the OM 2, and 3.25 x 15 mm drug-eluting stent to the mid RCA yesterday. Great results. Doing well. Okay for discharge with cardiology  2. Ischemic cardiomyopathy. Well compensated today. 3. Diabetes. 4. Hypertension. 5. GERD. 6. Hypercholesterolemia. 7. Obesity. 8. Sleep apnea. Not treated. Per others. Note: This report was completed using computerized voice recognition software. Every effort has been made to ensure accuracy, however; and invert and computerized transcription errors may be present.

## 2022-03-17 NOTE — PATIENT CARE CONFERENCE
[facility] Quality Flow/Interdisciplinary Rounds Progress Note        Quality Flow Rounds held on March 17, 2022    Disciplines Attending:  Bedside Nurse, ,  and Nursing Unit Leadership    Betty Oviedo was admitted on 3/16/2022 10:26 AM    Anticipated Discharge Date:  Expected Discharge Date: 03/17/22    Disposition:    Kodi Score:  Kodi Scale Score: 22    Readmission Risk              Risk of Unplanned Readmission:  7           Discussed patient goal for the day, patient clinical progression, and barriers to discharge.   The following Goal(s) of the Day/Commitment(s) have been identified:  Labs - Report Results      Tammy Moon RN  March 17, 2022

## 2022-03-17 NOTE — PROGRESS NOTES
CLINICAL PHARMACY NOTE: MEDS TO BEDS    Total # of Prescriptions Filled: 1   The following medications were delivered to the patient:  · Brilinta 90    Additional Documentation:   To PT in room

## 2022-03-17 NOTE — CONSULTS
Met with patient and discussed that their physician has ordered a referral to our outpatient Phase II Cardiac Rehabilitation program. Reviewed the benefits of cardiac rehabilitation based on their diagnosis and personal risk factors. Patient demonstrates moderate interest in Cardiac Rehabilitation at this time. Cardiac Rehabilitation brochure provided to patient/family. The Cardiac Rehabilitation Program has been provided the patient's referral information and pertinent patient details and history. The patient may call [facility] [place] at [phone] for additional information or questions. Contact information for [facility] and other choices close to the patient's residence have been provided in the discharge instructions so that the patient may call and schedule an appointment when cleared by their physician.  Thank you for the referral.

## 2022-03-18 ENCOUNTER — TELEPHONE (OUTPATIENT)
Dept: CARDIOLOGY CLINIC | Age: 62
End: 2022-03-18

## 2022-03-21 NOTE — TELEPHONE ENCOUNTER
Patient contacted office stating that since initiation of Brilinta and metoprolol, he has been SOB, had CP, dizziness and near syncope. His sugar levels have been off and his heartburn meds are not working. Please advise.

## 2022-03-22 LAB
EKG ATRIAL RATE: 71 BPM
EKG P AXIS: 2 DEGREES
EKG P-R INTERVAL: 188 MS
EKG Q-T INTERVAL: 418 MS
EKG QRS DURATION: 94 MS
EKG QTC CALCULATION (BAZETT): 454 MS
EKG R AXIS: 18 DEGREES
EKG T AXIS: 67 DEGREES
EKG VENTRICULAR RATE: 71 BPM

## 2022-03-22 NOTE — TELEPHONE ENCOUNTER
Patient contacted office today stating that he was taking his Brilinta 2 tablets once daily instead of 1 tablet twice daily. He will monitor his symptoms and contact office if he still is experiencing issues.

## 2022-03-22 NOTE — TELEPHONE ENCOUNTER
Brilinta might be contributing to his SOB (continue to monitor -- can reassess for switch to different antiplatelet agent pending clinical course). Does he monitor his blood pressure?

## 2022-03-31 ENCOUNTER — APPOINTMENT (OUTPATIENT)
Dept: CT IMAGING | Age: 62
DRG: 291 | End: 2022-03-31
Payer: MEDICARE

## 2022-03-31 ENCOUNTER — APPOINTMENT (OUTPATIENT)
Dept: GENERAL RADIOLOGY | Age: 62
DRG: 291 | End: 2022-03-31
Payer: MEDICARE

## 2022-03-31 ENCOUNTER — HOSPITAL ENCOUNTER (INPATIENT)
Age: 62
LOS: 2 days | Discharge: HOME | DRG: 291 | End: 2022-04-02
Attending: STUDENT IN AN ORGANIZED HEALTH CARE EDUCATION/TRAINING PROGRAM | Admitting: INTERNAL MEDICINE
Payer: MEDICARE

## 2022-03-31 DIAGNOSIS — J18.9 PNEUMONIA DUE TO INFECTIOUS ORGANISM, UNSPECIFIED LATERALITY, UNSPECIFIED PART OF LUNG: ICD-10-CM

## 2022-03-31 DIAGNOSIS — J96.01 ACUTE RESPIRATORY FAILURE WITH HYPOXIA (HCC): Primary | ICD-10-CM

## 2022-03-31 PROBLEM — E87.70 FLUID OVERLOAD: Status: ACTIVE | Noted: 2022-03-31

## 2022-03-31 LAB
ALBUMIN SERPL-MCNC: 3.9 G/DL (ref 3.5–5.2)
ALP BLD-CCNC: 54 U/L (ref 40–129)
ALT SERPL-CCNC: 16 U/L (ref 0–40)
ANION GAP SERPL CALCULATED.3IONS-SCNC: 13 MMOL/L (ref 7–16)
AST SERPL-CCNC: 13 U/L (ref 0–39)
BASOPHILS ABSOLUTE: 0.04 E9/L (ref 0–0.2)
BASOPHILS RELATIVE PERCENT: 0.5 % (ref 0–2)
BILIRUB SERPL-MCNC: 0.4 MG/DL (ref 0–1.2)
BUN BLDV-MCNC: 9 MG/DL (ref 6–23)
CALCIUM SERPL-MCNC: 8.8 MG/DL (ref 8.6–10.2)
CHLORIDE BLD-SCNC: 104 MMOL/L (ref 98–107)
CO2: 21 MMOL/L (ref 22–29)
CREAT SERPL-MCNC: 0.6 MG/DL (ref 0.7–1.2)
EKG ATRIAL RATE: 89 BPM
EKG P AXIS: 52 DEGREES
EKG P-R INTERVAL: 186 MS
EKG Q-T INTERVAL: 382 MS
EKG QRS DURATION: 82 MS
EKG QTC CALCULATION (BAZETT): 464 MS
EKG R AXIS: 23 DEGREES
EKG T AXIS: 64 DEGREES
EKG VENTRICULAR RATE: 89 BPM
EOSINOPHILS ABSOLUTE: 0.08 E9/L (ref 0.05–0.5)
EOSINOPHILS RELATIVE PERCENT: 1.1 % (ref 0–6)
GFR AFRICAN AMERICAN: >60
GFR NON-AFRICAN AMERICAN: >60 ML/MIN/1.73
GLUCOSE BLD-MCNC: 160 MG/DL (ref 74–99)
HBA1C MFR BLD: 6.9 % (ref 4–5.6)
HCT VFR BLD CALC: 38.5 % (ref 37–54)
HEMOGLOBIN: 13.4 G/DL (ref 12.5–16.5)
IMMATURE GRANULOCYTES #: 0.03 E9/L
IMMATURE GRANULOCYTES %: 0.4 % (ref 0–5)
INFLUENZA A: NOT DETECTED
INFLUENZA B: NOT DETECTED
LIPASE: 16 U/L (ref 13–60)
LYMPHOCYTES ABSOLUTE: 1.46 E9/L (ref 1.5–4)
LYMPHOCYTES RELATIVE PERCENT: 19.2 % (ref 20–42)
MCH RBC QN AUTO: 32.6 PG (ref 26–35)
MCHC RBC AUTO-ENTMCNC: 34.8 % (ref 32–34.5)
MCV RBC AUTO: 93.7 FL (ref 80–99.9)
METER GLUCOSE: 119 MG/DL (ref 74–99)
METER GLUCOSE: 134 MG/DL (ref 74–99)
METER GLUCOSE: 166 MG/DL (ref 74–99)
MONOCYTES ABSOLUTE: 0.58 E9/L (ref 0.1–0.95)
MONOCYTES RELATIVE PERCENT: 7.6 % (ref 2–12)
NEUTROPHILS ABSOLUTE: 5.4 E9/L (ref 1.8–7.3)
NEUTROPHILS RELATIVE PERCENT: 71.2 % (ref 43–80)
PDW BLD-RTO: 11.9 FL (ref 11.5–15)
PLATELET # BLD: 256 E9/L (ref 130–450)
PMV BLD AUTO: 11.2 FL (ref 7–12)
POTASSIUM SERPL-SCNC: 3.9 MMOL/L (ref 3.5–5)
PRO-BNP: 2055 PG/ML (ref 0–125)
PROCALCITONIN: 0.03 NG/ML (ref 0–0.08)
RBC # BLD: 4.11 E12/L (ref 3.8–5.8)
SARS-COV-2 RNA, RT PCR: NOT DETECTED
SODIUM BLD-SCNC: 138 MMOL/L (ref 132–146)
TOTAL PROTEIN: 7.4 G/DL (ref 6.4–8.3)
TROPONIN, HIGH SENSITIVITY: 18 NG/L (ref 0–11)
TROPONIN, HIGH SENSITIVITY: 19 NG/L (ref 0–11)
TSH SERPL DL<=0.05 MIU/L-ACNC: 4.71 UIU/ML (ref 0.27–4.2)
WBC # BLD: 7.6 E9/L (ref 4.5–11.5)

## 2022-03-31 PROCEDURE — 80053 COMPREHEN METABOLIC PANEL: CPT

## 2022-03-31 PROCEDURE — 84145 PROCALCITONIN (PCT): CPT

## 2022-03-31 PROCEDURE — 99223 1ST HOSP IP/OBS HIGH 75: CPT | Performed by: INTERNAL MEDICINE

## 2022-03-31 PROCEDURE — 83036 HEMOGLOBIN GLYCOSYLATED A1C: CPT

## 2022-03-31 PROCEDURE — 2500000003 HC RX 250 WO HCPCS: Performed by: STUDENT IN AN ORGANIZED HEALTH CARE EDUCATION/TRAINING PROGRAM

## 2022-03-31 PROCEDURE — 71045 X-RAY EXAM CHEST 1 VIEW: CPT

## 2022-03-31 PROCEDURE — 87636 SARSCOV2 & INF A&B AMP PRB: CPT

## 2022-03-31 PROCEDURE — 6360000002 HC RX W HCPCS: Performed by: STUDENT IN AN ORGANIZED HEALTH CARE EDUCATION/TRAINING PROGRAM

## 2022-03-31 PROCEDURE — 84484 ASSAY OF TROPONIN QUANT: CPT

## 2022-03-31 PROCEDURE — APPSS60 APP SPLIT SHARED TIME 46-60 MINUTES: Performed by: PHYSICIAN ASSISTANT

## 2022-03-31 PROCEDURE — 83880 ASSAY OF NATRIURETIC PEPTIDE: CPT

## 2022-03-31 PROCEDURE — 93005 ELECTROCARDIOGRAM TRACING: CPT | Performed by: STUDENT IN AN ORGANIZED HEALTH CARE EDUCATION/TRAINING PROGRAM

## 2022-03-31 PROCEDURE — 93010 ELECTROCARDIOGRAM REPORT: CPT | Performed by: INTERNAL MEDICINE

## 2022-03-31 PROCEDURE — 36415 COLL VENOUS BLD VENIPUNCTURE: CPT

## 2022-03-31 PROCEDURE — 96365 THER/PROPH/DIAG IV INF INIT: CPT

## 2022-03-31 PROCEDURE — 2060000000 HC ICU INTERMEDIATE R&B

## 2022-03-31 PROCEDURE — 85025 COMPLETE CBC W/AUTO DIFF WBC: CPT

## 2022-03-31 PROCEDURE — 6370000000 HC RX 637 (ALT 250 FOR IP): Performed by: NURSE PRACTITIONER

## 2022-03-31 PROCEDURE — 6360000004 HC RX CONTRAST MEDICATION: Performed by: RADIOLOGY

## 2022-03-31 PROCEDURE — 99284 EMERGENCY DEPT VISIT MOD MDM: CPT

## 2022-03-31 PROCEDURE — 2580000003 HC RX 258: Performed by: STUDENT IN AN ORGANIZED HEALTH CARE EDUCATION/TRAINING PROGRAM

## 2022-03-31 PROCEDURE — 82962 GLUCOSE BLOOD TEST: CPT

## 2022-03-31 PROCEDURE — 6370000000 HC RX 637 (ALT 250 FOR IP): Performed by: INTERNAL MEDICINE

## 2022-03-31 PROCEDURE — 71275 CT ANGIOGRAPHY CHEST: CPT

## 2022-03-31 PROCEDURE — 84443 ASSAY THYROID STIM HORMONE: CPT

## 2022-03-31 PROCEDURE — 96375 TX/PRO/DX INJ NEW DRUG ADDON: CPT

## 2022-03-31 PROCEDURE — 83690 ASSAY OF LIPASE: CPT

## 2022-03-31 RX ORDER — DEXTROSE MONOHYDRATE 50 MG/ML
100 INJECTION, SOLUTION INTRAVENOUS PRN
Status: DISCONTINUED | OUTPATIENT
Start: 2022-03-31 | End: 2022-04-02 | Stop reason: HOSPADM

## 2022-03-31 RX ORDER — PANTOPRAZOLE SODIUM 40 MG/1
40 TABLET, DELAYED RELEASE ORAL DAILY
Status: DISCONTINUED | OUTPATIENT
Start: 2022-03-31 | End: 2022-04-02 | Stop reason: HOSPADM

## 2022-03-31 RX ORDER — FUROSEMIDE 10 MG/ML
40 INJECTION INTRAMUSCULAR; INTRAVENOUS ONCE
Status: COMPLETED | OUTPATIENT
Start: 2022-03-31 | End: 2022-03-31

## 2022-03-31 RX ORDER — UBIDECARENONE 30 MG
200 CAPSULE ORAL 2 TIMES DAILY
COMMUNITY
End: 2022-05-02

## 2022-03-31 RX ORDER — ONDANSETRON 4 MG/1
4 TABLET, ORALLY DISINTEGRATING ORAL EVERY 8 HOURS PRN
Status: DISCONTINUED | OUTPATIENT
Start: 2022-03-31 | End: 2022-04-02 | Stop reason: HOSPADM

## 2022-03-31 RX ORDER — BUMETANIDE 0.25 MG/ML
1 INJECTION, SOLUTION INTRAMUSCULAR; INTRAVENOUS 2 TIMES DAILY
Status: DISCONTINUED | OUTPATIENT
Start: 2022-03-31 | End: 2022-04-02 | Stop reason: HOSPADM

## 2022-03-31 RX ORDER — SODIUM CHLORIDE 9 MG/ML
INJECTION, SOLUTION INTRAVENOUS PRN
Status: DISCONTINUED | OUTPATIENT
Start: 2022-03-31 | End: 2022-04-02 | Stop reason: HOSPADM

## 2022-03-31 RX ORDER — DEXTROSE MONOHYDRATE 25 G/50ML
12.5 INJECTION, SOLUTION INTRAVENOUS PRN
Status: DISCONTINUED | OUTPATIENT
Start: 2022-03-31 | End: 2022-04-02 | Stop reason: HOSPADM

## 2022-03-31 RX ORDER — SODIUM CHLORIDE 0.9 % (FLUSH) 0.9 %
10 SYRINGE (ML) INJECTION PRN
Status: DISCONTINUED | OUTPATIENT
Start: 2022-03-31 | End: 2022-04-02 | Stop reason: HOSPADM

## 2022-03-31 RX ORDER — BUMETANIDE 0.25 MG/ML
0.5 INJECTION, SOLUTION INTRAMUSCULAR; INTRAVENOUS 2 TIMES DAILY
Status: DISCONTINUED | OUTPATIENT
Start: 2022-03-31 | End: 2022-03-31

## 2022-03-31 RX ORDER — ACETAMINOPHEN 325 MG/1
650 TABLET ORAL EVERY 6 HOURS PRN
Status: DISCONTINUED | OUTPATIENT
Start: 2022-03-31 | End: 2022-04-02 | Stop reason: HOSPADM

## 2022-03-31 RX ORDER — ONDANSETRON 2 MG/ML
4 INJECTION INTRAMUSCULAR; INTRAVENOUS EVERY 6 HOURS PRN
Status: DISCONTINUED | OUTPATIENT
Start: 2022-03-31 | End: 2022-04-02 | Stop reason: HOSPADM

## 2022-03-31 RX ORDER — SODIUM CHLORIDE 0.9 % (FLUSH) 0.9 %
5-40 SYRINGE (ML) INJECTION EVERY 12 HOURS SCHEDULED
Status: DISCONTINUED | OUTPATIENT
Start: 2022-03-31 | End: 2022-04-02 | Stop reason: HOSPADM

## 2022-03-31 RX ORDER — CLOPIDOGREL BISULFATE 75 MG/1
75 TABLET ORAL DAILY
COMMUNITY
End: 2022-08-26

## 2022-03-31 RX ORDER — ASPIRIN 81 MG/1
81 TABLET ORAL DAILY
Status: DISCONTINUED | OUTPATIENT
Start: 2022-03-31 | End: 2022-04-02 | Stop reason: HOSPADM

## 2022-03-31 RX ORDER — NICOTINE POLACRILEX 4 MG
15 LOZENGE BUCCAL PRN
Status: DISCONTINUED | OUTPATIENT
Start: 2022-03-31 | End: 2022-04-02 | Stop reason: HOSPADM

## 2022-03-31 RX ORDER — ROSUVASTATIN CALCIUM 20 MG/1
40 TABLET, COATED ORAL NIGHTLY
Status: DISCONTINUED | OUTPATIENT
Start: 2022-03-31 | End: 2022-04-02 | Stop reason: HOSPADM

## 2022-03-31 RX ORDER — METOPROLOL SUCCINATE 50 MG/1
50 TABLET, EXTENDED RELEASE ORAL DAILY
Status: DISCONTINUED | OUTPATIENT
Start: 2022-03-31 | End: 2022-04-01

## 2022-03-31 RX ORDER — ZOLPIDEM TARTRATE 5 MG/1
5 TABLET ORAL NIGHTLY PRN
Status: DISCONTINUED | OUTPATIENT
Start: 2022-03-31 | End: 2022-04-02 | Stop reason: HOSPADM

## 2022-03-31 RX ORDER — ACETAMINOPHEN 650 MG/1
650 SUPPOSITORY RECTAL EVERY 6 HOURS PRN
Status: DISCONTINUED | OUTPATIENT
Start: 2022-03-31 | End: 2022-04-02 | Stop reason: HOSPADM

## 2022-03-31 RX ORDER — CLOPIDOGREL BISULFATE 75 MG/1
75 TABLET ORAL DAILY
Status: DISCONTINUED | OUTPATIENT
Start: 2022-03-31 | End: 2022-04-02 | Stop reason: HOSPADM

## 2022-03-31 RX ORDER — SPIRONOLACTONE 25 MG/1
25 TABLET ORAL DAILY
Status: DISCONTINUED | OUTPATIENT
Start: 2022-03-31 | End: 2022-04-02 | Stop reason: HOSPADM

## 2022-03-31 RX ADMIN — ALUMINUM HYDROXIDE, MAGNESIUM HYDROXIDE, AND SIMETHICONE: 200; 200; 20 SUSPENSION ORAL at 19:03

## 2022-03-31 RX ADMIN — METOPROLOL SUCCINATE 50 MG: 50 TABLET, EXTENDED RELEASE ORAL at 15:16

## 2022-03-31 RX ADMIN — DOXYCYCLINE 100 MG: 100 INJECTION, POWDER, LYOPHILIZED, FOR SOLUTION INTRAVENOUS at 10:49

## 2022-03-31 RX ADMIN — WATER 1000 MG: 1 INJECTION INTRAMUSCULAR; INTRAVENOUS; SUBCUTANEOUS at 10:48

## 2022-03-31 RX ADMIN — SPIRONOLACTONE 25 MG: 25 TABLET ORAL at 17:40

## 2022-03-31 RX ADMIN — CLOPIDOGREL BISULFATE 75 MG: 75 TABLET ORAL at 15:49

## 2022-03-31 RX ADMIN — IOPAMIDOL 75 ML: 755 INJECTION, SOLUTION INTRAVENOUS at 09:50

## 2022-03-31 RX ADMIN — SACUBITRIL AND VALSARTAN 1 TABLET: 24; 26 TABLET, FILM COATED ORAL at 21:28

## 2022-03-31 RX ADMIN — FUROSEMIDE 40 MG: 10 INJECTION, SOLUTION INTRAMUSCULAR; INTRAVENOUS at 13:25

## 2022-03-31 ASSESSMENT — PAIN SCALES - GENERAL: PAINLEVEL_OUTOF10: 0

## 2022-03-31 NOTE — ED PROVIDER NOTES
Department of Emergency Medicine   ED  Provider Note  Admit Date/RoomTime: 3/31/2022  6:52 AM  ED Room: 19/19          History of Present Illness:  3/31/22, Time: 7:32 AM EDT  Chief Complaint   Patient presents with    Shortness of Breath     chest tightness and SOB since getting stents placed 03/16/22. heartburn past 4 days and worsening does have hx gerd. denies chest pain. given 324 ASA PTA refused nitro     Yara Vasquez is a 64 y.o. male presenting to the ED for Shortness of breath. The patient had a cardiac catheterization on March 17. This was scheduled. He had PCI to the mid LAD, OM2 and mid RCA drug-eluting stents. The patient was on Brilinta. It was thought this was potentially causing shortness of breath so he was switched to Plavix. He has been short of breath since discharge from the hospital.  Nothing to better or worse. He states that last night it was actually worse with lying flat. Patient had an echocardiogram in January 2022. He had moderate global hypokinesis. He had stage I diastolic dysfunction with significantly reduced left ventricular systolic dysfunction at that time.:  The he had an EF of 30%. He denies any lower extremity edema. He is not coughing. No fevers chills or myalgias. Nothing makes his symptoms better or worse. He states that he was down to 75% when checking his oxygen at home on his home pulse oximeter.   Denies any diagnosed history of COPD in the past.      Review of Systems:  Review of systems obtained and negative unless stated otherwise above in the HPI.    --------------------------------------------- PAST HISTORY ---------------------------------------------  Past Medical History:  has a past medical history of Arthritis, Blood disorder, Bone marrow disorder, Bone spur, CAD in native artery, Cerebral meningioma ([facility] [address] Cervical radiculopathy, CHF (congestive heart failure) ([facility] [address] Claustrophobia, Coronary artery disease involving native coronary artery, Diabetes mellitus ([facility] [address] Disc displacement, lumbar, Enlarged heart, Enlarged prostate, GERD (gastroesophageal reflux disease), Hemoglobin disorder ([facility] [address] Hernia, Hyperlipidemia, Hypertension, Low back pain, Osteolytic lesion, Panic anxiety syndrome, S/P angioplasty with stent, and Sleep apnea. Past Surgical History:  has a past surgical history that includes hernia repair (1989); craniectomy (3/22/10); craniotomy (5/10/10); back surgery (8/10/11); and brain surgery. Social History:  reports that he has been smoking cigarettes. He has been smoking about 1.00 pack per day. He has never used smokeless tobacco. He reports current alcohol use. He reports that he does not use drugs. Family History: family history includes Heart Disease in his mother; Prostate Cancer in his father. . Unless otherwise noted, family history is non contributory    The patients home medications have been reviewed. Allergies: Adhesive tape    I have reviewed the past medical history, past surgical history, social history, and family history    ---------------------------------------------------PHYSICAL EXAM--------------------------------------    Constitutional: Appears in no distress  Head: Normocephalic, atraumatic  Eyes: Non-icteric slcera, no conjunctival injection  ENT: Moist mucous membranes  Neck: Trachea midline, no JVD  Respiratory: Labored respirations, the patient has bibasilar crackle, no wheezes noted, no rhonchi is noted  Cardiovascular: Regular rate. Regular rhythm. No murmurs, no gallops, no rubs. Gastrointestinal: Abdomen Soft, Non tender, Non distended. No rebound tenderness, guarding, or rigidity. Extremities: No lower extremity edema  Genitourinary: No CVA tenderness, no suprapubic tenderness  Musculoskeletal: Moves all extremities, no deformity  Skin: Pink, warm, dry without rash.   Neurologic: Alert, symmetric facies, no aphasia    -------------------------------------------------- RESULTS -------------------------------------------------  I have personally reviewed all laboratory and imaging results for this patient. Results are listed below.      LABS: (Lab results interpreted by me)  Results for orders placed or performed during the hospital encounter of 03/31/22   COVID-19 & Influenza Combo    Specimen: Nasopharyngeal Swab   Result Value Ref Range    SARS-CoV-2 RNA, RT PCR NOT DETECTED NOT DETECTED    INFLUENZA A NOT DETECTED NOT DETECTED    INFLUENZA B NOT DETECTED NOT DETECTED   CBC with Auto Differential   Result Value Ref Range    WBC 7.6 4.5 - 11.5 E9/L    RBC 4.11 3.80 - 5.80 E12/L    Hemoglobin 13.4 12.5 - 16.5 g/dL    Hematocrit 38.5 37.0 - 54.0 %    MCV 93.7 80.0 - 99.9 fL    MCH 32.6 26.0 - 35.0 pg    MCHC 34.8 (H) 32.0 - 34.5 %    RDW 11.9 11.5 - 15.0 fL    Platelets 368 432 - 962 E9/L    MPV 11.2 7.0 - 12.0 fL    Neutrophils % 71.2 43.0 - 80.0 %    Immature Granulocytes % 0.4 0.0 - 5.0 %    Lymphocytes % 19.2 (L) 20.0 - 42.0 %    Monocytes % 7.6 2.0 - 12.0 %    Eosinophils % 1.1 0.0 - 6.0 %    Basophils % 0.5 0.0 - 2.0 %    Neutrophils Absolute 5.40 1.80 - 7.30 E9/L    Immature Granulocytes # 0.03 E9/L    Lymphocytes Absolute 1.46 (L) 1.50 - 4.00 E9/L    Monocytes Absolute 0.58 0.10 - 0.95 E9/L    Eosinophils Absolute 0.08 0.05 - 0.50 E9/L    Basophils Absolute 0.04 0.00 - 0.20 E9/L   Comprehensive Metabolic Panel   Result Value Ref Range    Sodium 138 132 - 146 mmol/L    Potassium 3.9 3.5 - 5.0 mmol/L    Chloride 104 98 - 107 mmol/L    CO2 21 (L) 22 - 29 mmol/L    Anion Gap 13 7 - 16 mmol/L    Glucose 160 (H) 74 - 99 mg/dL    BUN 9 6 - 23 mg/dL    CREATININE 0.6 (L) 0.7 - 1.2 mg/dL    GFR Non-African American >60 >=60 mL/min/1.73    GFR African American >60     Calcium 8.8 8.6 - 10.2 mg/dL    Total Protein 7.4 6.4 - 8.3 g/dL    Albumin 3.9 3.5 - 5.2 g/dL    Total Bilirubin 0.4 0.0 - 1.2 mg/dL    Alkaline Phosphatase 54 40 - 129 U/L    ALT 16 0 - 40 U/L    AST 13 0 - 39 U/L   Lipase Result Value Ref Range    Lipase 16 13 - 60 U/L   Troponin   Result Value Ref Range    Troponin, High Sensitivity 18 (H) 0 - 11 ng/L   Brain Natriuretic Peptide   Result Value Ref Range    Pro-BNP 2,055 (H) 0 - 125 pg/mL   Troponin   Result Value Ref Range    Troponin, High Sensitivity 19 (H) 0 - 11 ng/L   Procalcitonin   Result Value Ref Range    Procalcitonin 0.03 0.00 - 0.08 ng/mL   EKG 12 Lead   Result Value Ref Range    Ventricular Rate 89 BPM    Atrial Rate 89 BPM    P-R Interval 186 ms    QRS Duration 82 ms    Q-T Interval 382 ms    QTc Calculation (Bazett) 464 ms    P Axis 52 degrees    R Axis 23 degrees    T Axis 64 degrees   ,       RADIOLOGY:  Interpreted by Radiologist unless otherwise specified  CTA PULMONARY W CONTRAST   Final Result   1. There is no pulmonary embolus. 2. Patchy bilateral pneumonia more prominent within the right and left lower   lobes   3. Small to moderate bilateral pleural effusions, right greater than left   4. Reactive lymphadenopathy within the right hilum         XR CHEST PORTABLE   Final Result   Opacity may represents pulmonary edema in a patient with cardiac history. Superimposed atelectasis, or pneumonia may be considered in the proper   clinical setting.               ------------------------- NURSING NOTES AND VITALS REVIEWED ---------------------------   The nursing notes within the ED encounter and vital signs as below have been reviewed by myself  BP (!) 133/102   Pulse 88   Temp 97.9 °F (36.6 °C) (Oral)   Resp 21   SpO2 96%     Oxygen Saturation Interpretation: Abnormal and Improved after treatment    The patients available past medical records and past encounters were reviewed.         ------------------------------ ED COURSE/MEDICAL DECISION MAKING----------------------  Medications   aspirin EC tablet 81 mg (81 mg Oral Not Given 3/31/22 1429)   metoprolol succinate (TOPROL XL) extended release tablet 50 mg (has no administration in time range) pantoprazole (PROTONIX) tablet 40 mg (has no administration in time range)   rosuvastatin (CRESTOR) tablet 40 mg (has no administration in time range)   ticagrelor (BRILINTA) tablet 90 mg (has no administration in time range)   zolpidem (AMBIEN) tablet 5 mg (has no administration in time range)   glucose (GLUTOSE) 40 % oral gel 15 g (has no administration in time range)   dextrose 50 % IV solution (has no administration in time range)   glucagon (rDNA) injection 1 mg (has no administration in time range)   dextrose 5 % solution (has no administration in time range)   sodium chloride flush 0.9 % injection 5-40 mL (has no administration in time range)   sodium chloride flush 0.9 % injection 10 mL (has no administration in time range)   0.9 % sodium chloride infusion (has no administration in time range)   ondansetron (ZOFRAN-ODT) disintegrating tablet 4 mg (has no administration in time range)     Or   ondansetron (ZOFRAN) injection 4 mg (has no administration in time range)   magnesium hydroxide (MILK OF MAGNESIA) 400 MG/5ML suspension 30 mL (has no administration in time range)   acetaminophen (TYLENOL) tablet 650 mg (has no administration in time range)     Or   acetaminophen (TYLENOL) suppository 650 mg (has no administration in time range)   enoxaparin (LOVENOX) injection 40 mg (40 mg SubCUTAneous Not Given 3/31/22 1430)   insulin lispro (HUMALOG) injection vial 0-12 Units (has no administration in time range)   insulin lispro (HUMALOG) injection vial 0-6 Units (has no administration in time range)   bumetanide (BUMEX) injection 0.5 mg (0.5 mg IntraVENous Not Given 3/31/22 1430)   iopamidol (ISOVUE-370) 76 % injection 75 mL (75 mLs IntraVENous Given 3/31/22 0950)   cefTRIAXone (ROCEPHIN) 1,000 mg in sterile water 10 mL IV syringe (1,000 mg IntraVENous Given 3/31/22 1048)   doxycycline (VIBRAMYCIN) 100 mg in dextrose 5 % 100 mL IVPB (0 mg IntraVENous Stopped 3/31/22 1152)   furosemide (LASIX) injection 40 mg (40 mg IntraVENous Given 3/31/22 6472)        Re-Evaluations: This patient's ED course included:a personal history and physicial examination, re-evaluation prior to disposition, multiple bedside re-evaluations, IV medications, cardiac monitoring, continuous pulse oximetry and complex medical decision making and emergency management    This patient has remained hemodynamically stable, improved, remained unchanged and been closely monitored during their ED course. Consultations:  Internal Medicine    Medical Decision Making:   Patient presents emerge department acutely short of breath. His vital signs were interpreted by myself as hypertensive and mild hypoxia 92%. He desaturates to 88% on room air. History and physical examination findings consistent multiple differential diagnosis including heart failure, PE, pleural effusions, pneumonia, viral syndrome. Work-up is initiated for this. Patient is maintained on nasal oxygen. EKG:  EKG interpreted myself as a ventricular rate of 89 beats. There is a P wave before recurrence complex castration is normal the QT/QTc is normal.  No ST segment elevation depression no T wave inversions. His EKG is interpreted myself as normal sinus rhythm no evidence of ischemia or infarct at this time. Labs and imaging reviewed no significant elevation in troponin. Procalcitonin is within normal limits. No leukocytosis. No left shift. Patient has questionable findings of pneumonia versus concomitant CHF exacerbation. He did have quite wet sounding lungs with some rhonchi at the bases. Will diurese in addition to treating for possible pneumonia. Additionally, the patient does have reduced ejection fraction with mild diastolic dysfunction. Discussed need for HCAP prophylaxis with pharmacy.   Will start patient on ceftriaxone and doxycycline and treat solely for CAP rather than HCAP as the patient had a very short admission recently without other significant risk factors for Pseudomonas. Patient will be admitted for further evaluation and management. Critical Care:  Upon my evaluation, this patient had a high probability of imminent or life-threatening deterioration due to hypoxic respiratory failure requiring supplemental oxygenation, which required my direct attention, intervention, and personal management. I have personally provided 35 minutes of critical care time excluding time spent on separately billable procedures. Time includes review of laboratory data, radiology results, discussion with consultants, and monitoring for potential decompensation. Interventions were performed as documented above. Counseling: The emergency provider has spoken with the patient and spouse/SO and discussed todays results, in addition to providing specific details for the plan of care and counseling regarding the diagnosis and prognosis. Questions are answered at this time and they are agreeable with the plan.       --------------------------------- IMPRESSION AND DISPOSITION ---------------------------------    IMPRESSION  1. Acute respiratory failure with hypoxia ([address]    2. Pneumonia due to infectious organism, unspecified laterality, unspecified part of lung      DISPOSITION  Disposition: Admit to med/surg floor  Patient condition is stable    IDr. Judith, am the primary provider of record    Judith Casanova DO  Emergency Medicine    NOTE: This report was transcribed using voice recognition software.  Every effort was made to ensure accuracy; however, inadvertent computerized transcription errors may be present         Juan Swain DO  03/31/22 1507

## 2022-03-31 NOTE — CONSULTS
Inpatient Cardiology Consultation      Reason for Consult:  CHF    Consulting Physician: Dr Melissa Doherty    Requesting Physician: Roger Sanchez, APRN - CNP    Date of Consultation: 3/31/2022    HISTORY OF PRESENT ILLNESS:   Mr Homar Howard is a 63 yo male who follows with Dr Libra Brody, last seen in the hospital with Dr Juanita Kovacs post [facility] / PCI 3/18/2022. Past medical history includes CAD s/p ELIZABETH-mid LAD, ELIZABETH-OM2, ELIZABETH-RCA 3/16/2022 (iFR os ostial LAD)  ischemic cardiomyopathy, HFrEF (EF 30% on TTE 1/2022 with inferior hypokinesis and fixed inferior defect on stress 12/2021), CAD, HTN, HLD, T2DM, NISH (untreated), GERD, h/o cerebral meningioma s/p resection, obesity, tobacco abuse     Presented to [facility] 3/31/2022 with chest tightness and SOB   VS: 97.9-90-16-88%RA-152/93   Labs:  WBC 7.6, H&H 13.4/38.5, plt 256. K+ 3.9, bun/scr 9/0.6, glucose 160  Troponin 19   proBNP 2055       CTA chest: no PE. Patch BL PNA  R R> L and BL pleural effusions R > L      He was given Lasix 40 mg I Vx1 in the ED and started on Abx. Cardiology was asked to see the patient for CHF. He states that he has had progressive dyspnea on exertion since his discharge with orthopnea PND and abdominal bloating. He saw his primary care who switched him from Brilinta to Plavix for his new onset of shortness of breath. He states that he has also been having some severe acid reflux in which his primary has been changing his acid reflux medication. Denies any chest pain cough or fevers. Please note: past medical records were reviewed per electronic medical record (EMR) - see detailed reports under Past Medical/ Surgical History. Past Medical History:    1. Coronary artery disease  2. Ischemic cardiomyopathy/HFrEF  3. [facility] 12/13[facility]21:  ECG during the infusion did not change.   The myocardial perfusion imaging was abnormal.    The abnormality was a a large sized fixed defect in the inferior wall suggestive of a prior MI   Overall left ventricular systolic function was abnormal with regional wall motion abnormalities. Intermediate risk  general pharmacologic stress test.  4. TTE 1/21/22 Yamil Maxcy:   Summary  difficult visualization. Contrast used.  moderate global hypokinesis. Inferior akinesis.   Severely reduced left ventricular systolic function. EF 27%  Stage I diastolic dysfunction.  Trace to mild mitral regurgitation. 5. [facility]): Angiographic Results/findings: Left Main: No  angiographically significant stenosis. LAD: Ostial diffuse eccentric 50% stenosis. Mid long diffuse 80% stenosis. D1: Mild luminal irregularities. D2: No angiographically significant stenosis. Kathrene Bolk Cx: Codominant vessel. No angiographically significant stenosis. .  OM1: Large vessel. Mid mild luminal irregularities. There is a small lateral branch that is a 1.8 to 2 mm vessel. This has an ostial 99% subtotal occlusion. Not amenable to PCI. Kathrene Bolk OM2: 2.0 mm vessel. Mid diffuse 90% stenosis. OM 3: Tiny vessel. OM 4: No angiographically significant stenosis. OM 5: (PDA) no angiographically significant stenosis. . Ramus: Absent. RCA: Mid diffuse eccentric  80% stenosis. . PDA: Off of the circumflex as above. . PLB: No angiographically significant stenosis. .   Interventional results: Mid LAD lesion PrePCI AGUSTIN 3 flow. Successful predilatation of the mid LAD lesion with a 2.0 mm balloon. This lesion was then crossed and stented with a 2.5 x 22 mm drug-eluting stent to 17 atmospheres of pressure. This resulted in 0 percent residual stenosis with AGUSTIN-3 flow.    OM2 lesion PrePCI AGUSTIN 3 flow. Successful predilatation of the OM2 lesion with a 2.0 mm balloon. This lesion was then crossed and stented with a 2.0 x 12 mm drug-eluting stent to 14 atmospheres of pressure. This resulted in 0 percent residual stenosis with AGUSTIN-3 flow.      RCA lesion Pre-PCI AGUSTIN-3 flow. Predilatation with a 3.0 mm balloon.   Successful stenting of the mid RCA lesion with a 3.25 x 15 mm drug-eluting stent.  This was postdilated with a 3.25 mm noncompliant balloon to 18 shannon pressure. This resulted in 0% residual stenosis and AGUSTIN-3 flow.     Percutaneous coronary artery intervention of the mid LAD with a 2.5 x 22 mm drug-eluting stent. 3.  Percutaneous coronary artery intervention of the OM2 with a 2.0 x 12 mm drug-eluting stent. 4.  Percutaneous coronary intervention of the mid RCA with a 3.25 x 15 mm drug-eluting stent. 5.  iFR of the ostial LAD. 6. Hypertension  7. Hyperlipidemia  8. Obstructive sleep apnea  9. Obesity type 2 diabetes mellitus  10. GERD  11. History of cerebral meningioma s/p surgical resection  12. Recent tobacco abuse, quit at the time of his PCI 3/2022.  43+ pack year history  13. History of hernia repair, back surgery      Medications Prior to admit:  Prior to Admission medications    Medication Sig Start Date End Date Taking? Authorizing Provider   metFORMIN (GLUCOPHAGE-XR) 500 MG extended release tablet Take 2 tablets by mouth daily (with breakfast) 3/18/22   Nakul Dodge DO   ticagrelor (BRILINTA) 90 MG TABS tablet Take 1 tablet by mouth 2 times daily  Patient not taking: Reported on 3/31/2022 3/17/22   [employer],    rosuvastatin (CRESTOR) 40 MG tablet Take 1 tablet by mouth nightly 3/17/22   Nakul Dodge DO   famotidine (PEPCID) 40 MG tablet Take 40 mg by mouth daily    Historical Provider, MD   pantoprazole (PROTONIX) 40 MG tablet Take 40 mg by mouth daily  Patient not taking: Reported on 3/31/2022    Historical Provider, MD   aspirin EC 81 MG EC tablet Take 1 tablet by mouth daily 3/10/22   Jed Ring MD   metoprolol succinate (TOPROL XL) 50 MG extended release tablet Take 1 tablet by mouth daily 3/10/22   Jed Ring MD   tiZANidine (ZANAFLEX) 4 MG tablet Take 4 mg by mouth every 6 hours as needed    Historical Provider, MD   zolpidem (AMBIEN) 10 MG tablet Take by mouth nightly as needed for Sleep.     Historical Provider, MD   Probiotic Product (SOLUBLE FIBER/PROBIOTICS PO) Take by mouth    Historical Provider, MD       Current Medications:    Current Facility-Administered Medications: aspirin EC tablet 81 mg, 81 mg, Oral, Daily  metoprolol succinate (TOPROL XL) extended release tablet 50 mg, 50 mg, Oral, Daily  pantoprazole (PROTONIX) tablet 40 mg, 40 mg, Oral, Daily  rosuvastatin (CRESTOR) tablet 40 mg, 40 mg, Oral, Nightly  ticagrelor (BRILINTA) tablet 90 mg, 90 mg, Oral, BID  zolpidem (AMBIEN) tablet 5 mg, 5 mg, Oral, Nightly PRN  glucose (GLUTOSE) 40 % oral gel 15 g, 15 g, Oral, PRN  dextrose 50 % IV solution, 12.5 g, IntraVENous, PRN  glucagon (rDNA) injection 1 mg, 1 mg, IntraMUSCular, PRN  dextrose 5 % solution, 100 mL/hr, IntraVENous, PRN  sodium chloride flush 0.9 % injection 5-40 mL, 5-40 mL, IntraVENous, 2 times per day  sodium chloride flush 0.9 % injection 10 mL, 10 mL, IntraVENous, PRN  0.9 % sodium chloride infusion, , IntraVENous, PRN  ondansetron (ZOFRAN-ODT) disintegrating tablet 4 mg, 4 mg, Oral, Q8H PRN **OR** ondansetron (ZOFRAN) injection 4 mg, 4 mg, IntraVENous, Q6H PRN  magnesium hydroxide (MILK OF MAGNESIA) 400 MG/5ML suspension 30 mL, 30 mL, Oral, Daily PRN  acetaminophen (TYLENOL) tablet 650 mg, 650 mg, Oral, Q6H PRN **OR** acetaminophen (TYLENOL) suppository 650 mg, 650 mg, Rectal, Q6H PRN  enoxaparin (LOVENOX) injection 40 mg, 40 mg, SubCUTAneous, Daily  insulin lispro (HUMALOG) injection vial 0-12 Units, 0-12 Units, SubCUTAneous, TID WC  insulin lispro (HUMALOG) injection vial 0-6 Units, 0-6 Units, SubCUTAneous, Nightly  bumetanide (BUMEX) injection 0.5 mg, 0.5 mg, IntraVENous, BID    Allergies:  Adhesive tape    Social History:    Lives with his wife. Completes activities of daily living with chronic dyspnea on exertion. Denies chest pain with activities of daily living. Does not require supplemental oxygen or use ambulation assistance devices. Previous tobacco abuse.   Quit 20 days ago (3/16/2022)  1-2 pack/day for 43 years  Alcohol abuse, beer daily. Quit 20 days ago 0/12/3215  Denies illicit drug use  Full code      Family History: This information was not obtained at this time as it is found noncontributory secondary to the patients advanced age. REVIEW OF SYSTEMS:     · Constitutional: Denies fevers, chills, night sweats, and fatigue  · HEENT: Denies headaches, nose bleeds, and blurred vision,oral pain, abscess or lesion. · Musculoskeletal: Denies falls, pain to BLE with ambulation and edema to BLE. · Neurological: Denies dizziness and lightheadedness, numbness and tingling  · Cardiovascular: Denies chest pain, palpitations, and feelings of heart racing. · Respiratory: +orthopnea and PND, cough, CALERO  · Gastrointestinal: Denies heartburn, nausea/vomiting, diarrhea and constipation, black/bloody, and tarry stools. · Genitourinary: Denies dysuria and hematuria  · Hematologic: Denies excessive bruising or bleeding  · Lymphatic: Denies lumps and bumps to neck, axilla, breast, and groin      PHYSICAL EXAM:   BP (!) 152/98   Pulse 82   Temp 97.9 °F (36.6 °C) (Oral)   Resp 23   SpO2 96%   CONST:  Well developed, obese  male who appears stated age. Awake, alert, cooperative, no apparent distress  HEENT:   Head- Normocephalic, atraumatic   Eyes- Conjunctivae pink, anicteric  Throat- Oral mucosa pink and moist  Neck-  No stridor, trachea midline, no jugular venous distention (difficult to assess secondary to body habitus). CHEST: Chest symmetrical and non-tender to palpation. RESPIRATORY: Diminished breath sounds bilaterally. No wheeze or rhonchi noted  CARDIOVASCULAR:     No carotid bruit noted bilaterally   Heart Ausculation- Regular rate and rhythm, no murmur. No s3, s4 or rub   PV: No lower extremity edema. No varicosities. ABDOMEN: Soft, non-tender to light palpation. Bowel sounds present. MS: Good muscle strength and tone. No atrophy or abnormal movements.    : Deferred   SKIN: Warm and dry no statis dermatitis or ulcers   NEURO / PSYCH: Oriented to person, place and time. Speech clear and appropriate. Follows all commands. Pleasant affect     DATA:    ECG: Sinus rhythm heart rate 89. Tele strips:  SR   Diagnostic:      Labs:   CBC:   Recent Labs     03/31/22  0746   WBC 7.6   HGB 13.4   HCT 38.5        BMP:   Recent Labs     03/31/22  0746      K 3.9   CO2 21*   BUN 9   CREATININE 0.6*   LABGLOM >60   CALCIUM 8.8       FASTING LIPID PANEL:  Lab Results   Component Value Date    CHOL 213 12/18/2010    HDL 40.0 12/18/2010    LDLCALC 135 12/18/2010    TRIG 191 12/18/2010     LIVER PROFILE:  Recent Labs     03/31/22  0746   AST 13   ALT 16   LABALBU 3.9       Assessment and Plan per Dr Ismael Park   Electronically signed by Qiana Fragoso on 3/31/2022 at 2:19 PM      I have personally seen and evaluated the patient. I personally obtained the history and performed the physical exam.  I personally reviewed all of the above labs, history, review of systems, and data. All of the assessments and recommendations are from me. All of the above cardiac medical decisions are from me. Please see my additional contributions to the history, physical exam, assessment, and recommendations below. History of chief complaint:  He is somewhat of a difficult historian upon arrival.  He is irritated that he has spent all day in the emergency room. He was recently diagnosed with a severe cardiomyopathy. He underwent a cardiac catheterization and had stents to the LAD, obtuse marginal, and RCA on 3/18/2022. Since his discharge she has slowly been having increasing shortness of breath. His family members present in tries to help with the history. They do admit to orthopnea/PND and increasing abdominal distention. He also complains of dyspnea.   He has significant GERD and states that sometimes after eating he feels epigastric and chest burning discomfort he states his GERD medicines were recently changed. He is currently in no distress. Review of systems:     Heart: as above   Lungs: as above   Eyes: denies changes in vision or discharge. Ears: denies changes in hearing or pain. Nose: denies epistaxis or masses   Throat: denies sore throat or trouble swallowing. Neuro: denies numbness, tingling, tremors. Skin: denies rashes or itching. : denies hematuria, dysuria   GI: denies vomiting, diarrhea   Psych: denies mood changed, anxiety, depression. Physical exam:  /84   Pulse 86   Temp 97.9 °F (36.6 °C) (Oral)   Resp 22   SpO2 96%   Constitutional: A&O x3, communicates well, no acute distress. Eyes: extraocular muscles intact, PERRL. Normal lids & conjunctiva. No icterus. ENT: clear, no bleeding. No external masses. Lips normal formation. Neck: Difficult exam.  Supple, full ROM, no JVD, no bruits, no lymphadenopathy. No masses. trachea midline. Heart: regular rate & rhythm, normal S1 & S2, no abnormal murmurs. No heave. Lungs: Poor air movement. Bibasilar rales. .  No accessory muscles. Abd: soft, non-tender. Normal bowel sounds. Morbidly obese. Possibly mild distended. Neuro: Full ROM X 4, EOMI, no tremors. EXT: Trace bilateral lower extremity edema  Skin: warm, dry, intact. Good turgor. Psych: A&O x 3, normal behavior, not anxious. Patient seen and examined. Chart, labs & data reviewed. A:  1. Severe ischemic cardiomyopathy. 2. Uncontrolled hypertension. 3. Pulmonary edema/hypervolemia due to acute on chronic HFrEF. 4. Coronary artery disease. Drug-eluting stents to the LAD, obtuse marginal, and RCA 3/18/2022. He states that his PCP just changed his Brilinta to Plavix because of his shortness of breath. 5. Diabetes. 6. Untreated sleep apnea  7. GERD. 8. COPD on exam.  He just quit smoking when he had his stents placed 3/18/2022. 9. Morbidly obese. Rec:  1. IV diuresis. 2. Follow lites and creatinine.   3. Add Entresto. 4. Aldactone. 5. LifeVest on discharge. Electronically signed by Behzad Pena DO on 3/31/2022 at 6:40 PM    Note: This report was completed using computerized voice recognition software. Every effort has been made to ensure accuracy, however; and invert and computerized transcription errors may be present.

## 2022-04-01 PROBLEM — I50.9 CONGESTIVE HEART FAILURE (CHF) (HCC): Status: ACTIVE | Noted: 2022-04-01

## 2022-04-01 LAB
ANION GAP SERPL CALCULATED.3IONS-SCNC: 11 MMOL/L (ref 7–16)
BASOPHILS ABSOLUTE: 0.02 E9/L (ref 0–0.2)
BASOPHILS RELATIVE PERCENT: 0.3 % (ref 0–2)
BUN BLDV-MCNC: 10 MG/DL (ref 6–23)
CALCIUM SERPL-MCNC: 9.2 MG/DL (ref 8.6–10.2)
CHLORIDE BLD-SCNC: 101 MMOL/L (ref 98–107)
CO2: 27 MMOL/L (ref 22–29)
CREAT SERPL-MCNC: 0.7 MG/DL (ref 0.7–1.2)
EOSINOPHILS ABSOLUTE: 0.12 E9/L (ref 0.05–0.5)
EOSINOPHILS RELATIVE PERCENT: 1.6 % (ref 0–6)
GFR AFRICAN AMERICAN: >60
GFR NON-AFRICAN AMERICAN: >60 ML/MIN/1.73
GLUCOSE BLD-MCNC: 144 MG/DL (ref 74–99)
HCT VFR BLD CALC: 39.8 % (ref 37–54)
HEMOGLOBIN: 13.7 G/DL (ref 12.5–16.5)
IMMATURE GRANULOCYTES #: 0.04 E9/L
IMMATURE GRANULOCYTES %: 0.5 % (ref 0–5)
LYMPHOCYTES ABSOLUTE: 1.91 E9/L (ref 1.5–4)
LYMPHOCYTES RELATIVE PERCENT: 25.5 % (ref 20–42)
MAGNESIUM: 1.9 MG/DL (ref 1.6–2.6)
MCH RBC QN AUTO: 32.9 PG (ref 26–35)
MCHC RBC AUTO-ENTMCNC: 34.4 % (ref 32–34.5)
MCV RBC AUTO: 95.4 FL (ref 80–99.9)
METER GLUCOSE: 130 MG/DL (ref 74–99)
METER GLUCOSE: 150 MG/DL (ref 74–99)
METER GLUCOSE: 158 MG/DL (ref 74–99)
METER GLUCOSE: 180 MG/DL (ref 74–99)
MONOCYTES ABSOLUTE: 0.64 E9/L (ref 0.1–0.95)
MONOCYTES RELATIVE PERCENT: 8.5 % (ref 2–12)
NEUTROPHILS ABSOLUTE: 4.76 E9/L (ref 1.8–7.3)
NEUTROPHILS RELATIVE PERCENT: 63.6 % (ref 43–80)
PDW BLD-RTO: 11.8 FL (ref 11.5–15)
PLATELET # BLD: 261 E9/L (ref 130–450)
PMV BLD AUTO: 11.2 FL (ref 7–12)
POTASSIUM SERPL-SCNC: 3.8 MMOL/L (ref 3.5–5)
RBC # BLD: 4.17 E12/L (ref 3.8–5.8)
SODIUM BLD-SCNC: 139 MMOL/L (ref 132–146)
WBC # BLD: 7.5 E9/L (ref 4.5–11.5)

## 2022-04-01 PROCEDURE — 6370000000 HC RX 637 (ALT 250 FOR IP): Performed by: INTERNAL MEDICINE

## 2022-04-01 PROCEDURE — 6370000000 HC RX 637 (ALT 250 FOR IP): Performed by: NURSE PRACTITIONER

## 2022-04-01 PROCEDURE — 80048 BASIC METABOLIC PNL TOTAL CA: CPT

## 2022-04-01 PROCEDURE — 99232 SBSQ HOSP IP/OBS MODERATE 35: CPT | Performed by: INTERNAL MEDICINE

## 2022-04-01 PROCEDURE — 2580000003 HC RX 258: Performed by: NURSE PRACTITIONER

## 2022-04-01 PROCEDURE — 93308 TTE F-UP OR LMTD: CPT

## 2022-04-01 PROCEDURE — 2060000000 HC ICU INTERMEDIATE R&B

## 2022-04-01 PROCEDURE — 36415 COLL VENOUS BLD VENIPUNCTURE: CPT

## 2022-04-01 PROCEDURE — 83735 ASSAY OF MAGNESIUM: CPT

## 2022-04-01 PROCEDURE — 82962 GLUCOSE BLOOD TEST: CPT

## 2022-04-01 PROCEDURE — 85025 COMPLETE CBC W/AUTO DIFF WBC: CPT

## 2022-04-01 PROCEDURE — 2500000003 HC RX 250 WO HCPCS: Performed by: INTERNAL MEDICINE

## 2022-04-01 PROCEDURE — APPSS30 APP SPLIT SHARED TIME 16-30 MINUTES: Performed by: PHYSICIAN ASSISTANT

## 2022-04-01 RX ORDER — POTASSIUM CHLORIDE 20 MEQ/1
20 TABLET, EXTENDED RELEASE ORAL ONCE
Status: COMPLETED | OUTPATIENT
Start: 2022-04-01 | End: 2022-04-01

## 2022-04-01 RX ORDER — METOPROLOL SUCCINATE 100 MG/1
100 TABLET, EXTENDED RELEASE ORAL DAILY
Status: DISCONTINUED | OUTPATIENT
Start: 2022-04-02 | End: 2022-04-02 | Stop reason: HOSPADM

## 2022-04-01 RX ADMIN — METOPROLOL SUCCINATE 50 MG: 50 TABLET, EXTENDED RELEASE ORAL at 07:57

## 2022-04-01 RX ADMIN — ASPIRIN 81 MG: 81 TABLET, COATED ORAL at 07:56

## 2022-04-01 RX ADMIN — ROSUVASTATIN CALCIUM 40 MG: 20 TABLET, COATED ORAL at 20:21

## 2022-04-01 RX ADMIN — POTASSIUM CHLORIDE 20 MEQ: 20 TABLET, EXTENDED RELEASE ORAL at 17:40

## 2022-04-01 RX ADMIN — BUMETANIDE 1 MG: 0.25 INJECTION, SOLUTION INTRAMUSCULAR; INTRAVENOUS at 07:57

## 2022-04-01 RX ADMIN — SODIUM CHLORIDE, PRESERVATIVE FREE 10 ML: 5 INJECTION INTRAVENOUS at 08:05

## 2022-04-01 RX ADMIN — BUMETANIDE 1 MG: 0.25 INJECTION, SOLUTION INTRAMUSCULAR; INTRAVENOUS at 20:21

## 2022-04-01 RX ADMIN — ZOLPIDEM TARTRATE 5 MG: 5 TABLET ORAL at 20:21

## 2022-04-01 RX ADMIN — SPIRONOLACTONE 25 MG: 25 TABLET ORAL at 07:56

## 2022-04-01 RX ADMIN — SACUBITRIL AND VALSARTAN 1 TABLET: 24; 26 TABLET, FILM COATED ORAL at 22:12

## 2022-04-01 RX ADMIN — SACUBITRIL AND VALSARTAN 1 TABLET: 24; 26 TABLET, FILM COATED ORAL at 07:56

## 2022-04-01 RX ADMIN — PANTOPRAZOLE SODIUM 40 MG: 40 TABLET, DELAYED RELEASE ORAL at 07:56

## 2022-04-01 RX ADMIN — CLOPIDOGREL BISULFATE 75 MG: 75 TABLET ORAL at 07:57

## 2022-04-01 RX ADMIN — SODIUM CHLORIDE, PRESERVATIVE FREE 10 ML: 5 INJECTION INTRAVENOUS at 22:13

## 2022-04-01 ASSESSMENT — PAIN SCALES - GENERAL
PAINLEVEL_OUTOF10: 0

## 2022-04-01 NOTE — H&P
[facility]  History and Physical      CHIEF COMPLAINT:    Chief Complaint   Patient presents with    Shortness of Breath     chest tightness and SOB since getting stents placed 03/16/22. heartburn past 4 days and worsening does have hx gerd. denies chest pain. given 324 ASA PTA refused nitro        Patient of Kelle Schaumann, MD presents with:  Congestive heart failure (CHF) ([facility] [address]    History of Present Illness:   Patient is a 59-year-old male with a past medical history of CAD in native artery, DM, GERD, hyperlipidemia, hypertension, osteolytic lesion, and sleep apnea. Patient presented to the ED with complaints of shortness of breath since his discharge on 3/18/2022. Patient had a left heart cath with PCI on 3/17. Patient states his shortness of breath is worsened with exertion and is not relieved with rest.  Patient was taking Brilinta with a known side effect of shortness of breath therefore he was changed to Plavix and the shortness of breath continued. Patient stated that her shortness of breath is actually a lot worse when he is lying flat. Patient is currently on room air as his baseline is room air. Patient quit smoking approximately 3 weeks ago. Patient is alert and oriented sitting up at side of bed on examination. Patient denies any chest pain, fever, chills, headache, dizziness, blurred vision, and abdominal pain. ER work-up revealed elevated BNP 2,055, troponin XVIII, and TSH slightly elevated at 4.710. Patient was admitted for further testing and treatment. REVIEW OF SYSTEMS:  Pertinent negatives are above in HPI. 10 point ROS otherwise negative.       Past Medical History:   Diagnosis Date    Arthritis     ASK WHERE    Blood disorder     Bone marrow disorder     Bone spur     IN CERVICAL REGION    CAD in native artery 03/16/2022    Cerebral meningioma (HCC)     Cervical radiculopathy     CHF (congestive heart failure) (HCC)     Claustrophobia     Coronary artery disease involving native coronary artery     Diabetes mellitus (HCC)     Disc displacement, lumbar     Enlarged heart     Enlarged prostate     GERD (gastroesophageal reflux disease)     Hemoglobin disorder ([facility] [address] 01/01/2012    HIGH. .. HAS F/U ON 6/21/12 WITH DR. JIMENEZ    Hernia     Hyperlipidemia     Hypertension     Low back pain     Osteolytic lesion     Panic anxiety syndrome     S/P angioplasty with stent 03/16/2022    LAD, OM, RCA    Sleep apnea     awaiting C-papa back ordered         Past Surgical History:   Procedure Laterality Date    BACK SURGERY  8/10/11    DR. Niranjan Rodrigues [facility]. .. FUSION    BRAIN SURGERY      CRANIECTOMY  3/22/10    RIGHT TEMPORAL REGION    CRANIOTOMY  5/10/10    RIGHT FRONTOTEMPORAL REGION   [address]    DR. RIVERS       Medications Prior to Admission:    Medications Prior to Admission: clopidogrel (PLAVIX) 75 MG tablet, Take 75 mg by mouth daily  co-enzyme Q-10 30 MG CAPS capsule, Take 200 mg by mouth 2 times daily  metFORMIN (GLUCOPHAGE-XR) 500 MG extended release tablet, Take 2 tablets by mouth daily (with breakfast)  ticagrelor (BRILINTA) 90 MG TABS tablet, Take 1 tablet by mouth 2 times daily (Patient not taking: Reported on 3/31/2022)  rosuvastatin (CRESTOR) 40 MG tablet, Take 1 tablet by mouth nightly (Patient taking differently: Take 20 mg by mouth nightly )  famotidine (PEPCID) 40 MG tablet, Take 40 mg by mouth daily  pantoprazole (PROTONIX) 40 MG tablet, Take 40 mg by mouth daily (Patient not taking: Reported on 3/31/2022)  aspirin EC 81 MG EC tablet, Take 1 tablet by mouth daily  metoprolol succinate (TOPROL XL) 50 MG extended release tablet, Take 1 tablet by mouth daily  tiZANidine (ZANAFLEX) 4 MG tablet, Take 4 mg by mouth every 6 hours as needed  zolpidem (AMBIEN) 10 MG tablet, Take by mouth nightly as needed for Sleep.   Probiotic Product (SOLUBLE FIBER/PROBIOTICS PO), Take by mouth    Note that the patient's home medications were reviewed and the above list is accurate to the best of my knowledge at the time of the exam.    Allergies:    Adhesive tape    Social History:    reports that he has been smoking cigarettes. He has been smoking about 1.00 pack per day. He has never used smokeless tobacco. He reports current alcohol use. He reports that he does not use drugs. Family History:   family history includes Heart Disease in his mother; Prostate Cancer in his father. PHYSICAL EXAM:    Vitals:  /72   Pulse 80   Temp 98.4 °F (36.9 °C) (Oral)   Resp 20   Ht 5' 8\" (1.727 m)   Wt 242 lb (109.8 kg)   SpO2 95%   BMI 36.80 kg/m²       General appearance: NAD, conversant, obese  Eyes: Sclerae anicteric, PERRLA  HEENT: AT/NC, MMM  Neck: FROM, supple, no thyromegaly  Lymph: No cervical / supraclavicular lymphadenopathy  Lungs: Clear to auscultation, WOB normal  CV: RRR, no MRGs, bilateral lower extremity edema  Abdomen: Soft, non-tender; no masses or HSM, +BS  Extremities: FROM without synovitis. No clubbing or cyanosis of the hands. Bilateral lower extremity edema  Skin: no rash, induration, lesions, or ulcers  Psych: Calm and cooperative. Normal judgement and insight. Normal mood and affect. Neuro: Alert and interactive, face symmetric, speech fluent. LABS:  All labs reviewed.   Of note:  CBC with Differential:    Lab Results   Component Value Date    WBC 7.5 04/01/2022    RBC 4.17 04/01/2022    HGB 13.7 04/01/2022    HCT 39.8 04/01/2022     04/01/2022    MCV 95.4 04/01/2022    MCH 32.9 04/01/2022    MCHC 34.4 04/01/2022    RDW 11.8 04/01/2022    SEGSPCT 63 04/21/2011    LYMPHOPCT 25.5 04/01/2022    MONOPCT 8.5 04/01/2022    BASOPCT 0.3 04/01/2022    MONOSABS 0.64 04/01/2022    LYMPHSABS 1.91 04/01/2022    EOSABS 0.12 04/01/2022    BASOSABS 0.02 04/01/2022     CMP:    Lab Results   Component Value Date     04/01/2022    K 3.8 04/01/2022     04/01/2022    CO2 27 04/01/2022    BUN 10 04/01/2022    CREATININE 0.7 04/01/2022    GFRAA >60 04/01/2022    LABGLOM >60 04/01/2022    GLUCOSE 144 04/01/2022    GLUCOSE 78 04/21/2011    PROT 7.4 03/31/2022    LABALBU 3.9 03/31/2022    LABALBU 4.5 01/05/2011    CALCIUM 9.2 04/01/2022    BILITOT 0.4 03/31/2022    ALKPHOS 54 03/31/2022    AST 13 03/31/2022    ALT 16 03/31/2022       Imaging:  CXR: Opacity may represent pulmonary edema in a patient with cardiac history. Superimposed atelectasis, or pneumonia may be considered in the proper clinical setting. CTA pulmonary: There is no pulmonary embolus. Patchy bilateral pneumonia more prominent within the right and left lower lobes. Small to moderate bilateral pleural effusions, right greater than left. Reactive lymphadenopathy within the right hilum. EKG:  NSR    Telemetry:  I've personally reviewed the patient's telemetry:  NSR    ASSESSMENT/PLAN:  Principal Problem:    Congestive heart failure (CHF) (HCC)  Resolved Problems:    * No resolved hospital problems. *    71-year-old male with recent left heart cath/PCI on 3/18/2022, has been compliant with his home medications-presented with complaints of worsening shortness of breath especially with movement and ambulation-admitted to telemetry unit with    Overt systolic congestive heart failure with volume overload  -Diurese cautiously so as to avoid renal insufficiency  -BUN/Creat:  10/0.7  -Daily weights strict I's and O's  -Low-sodium diet  -Supplement O2 to maintain pulse ox duration greater than 93% wean as tolerated  -Cardiology following - appreciate input  -Echo - 1/21/2022 - EF 17%, stage 1 diastolic dysfunction  -Smoking cessation  -Patient will possibly need life vest prior to discharge.  Await echo reading.  -Diet modifications discussed with patient.  -Continue goal-directed medical therapy for ischemic cardiomyopathy  -Ambulate in room and check ambulating pulse ox, increase in diuretic as warranted    Medication for other comorbidities continue as appropriate dose adjustment as necessary. DVT prophylaxis  PT OT  Discharge planning  Case discussed with attending and agreed upon plan of care. Code status: Full  Requires inpatient level of care  VAZQUEZ Pruett CNP    10:46 AM  4/1/2022     Above note edited to reflect my thoughts     I personally saw, examined and provided care for the patient. Radiographs, labs and medication list were reviewed by me independently. The case was discussed in detail and plans for care were established. Review of Rosemary GREY CNP, documentation was conducted and revisions were made as appropriate directly by me. I agree with the above documented exam, problem list, and plan of care.      Rommel Sanford MD  2:17 PM  4/1/2022

## 2022-04-01 NOTE — CONSULTS
Nutrition Education    Pt reported that he generally does the cooking and shopping. However he did admit that he does sometimes consume larger portions that allowed of foods that are high sodium such as brown and cheese. Discussed moderation of high sodium foods and label reading. Additionally discussed low sodium handouts with pt and all questions were answered. · Verbally reviewed information with Patient and Wife  · Educated on CHF, Low Sodium Diet  · Written educational materials provided. · Contact name and number provided.     Electronically signed by Thee Connelly RD, LD on 4/1/22 at 2:22 PM EDT    Contact: [phone]    Spent about 20 minutes instructing pt and pt's wife on low sodium diet

## 2022-04-01 NOTE — PROGRESS NOTES
Patient refusing insulin and Lovenox shot. According to patient he knows hes off the metformin due to the contrast from yesterday but doesn't want the insulin because he doesn't need it. The Lovenox he states hes on the plavix  And doesn't need anything else.  Educated on the Britt[address] and he understands

## 2022-04-01 NOTE — ED NOTES
SBAR, report called to [employer].  No distress noted     Nidia العراقي, ALBINA  03/31/22 1305

## 2022-04-01 NOTE — PLAN OF CARE
Patient's chart updated to reflect:      . - HF care plan, HF education points and HF discharge instructions.  -Orders: 2 gram sodium diet, daily weights, I/O.  -PCP and cardiology follow up appointments to be scheduled within 7 days of hospital discharge. -CHF education session will be provided to the patient prior to hospital discharge.     Yamilet Shepherd RN RN, BSN  Heart Failure Navigator

## 2022-04-01 NOTE — PROGRESS NOTES
Inpatient Cardiology Progress note     PATIENT IS BEING FOLLOWED FOR: CHF    Alix Celis is a 64 y.o. male who follows with Dr. Chris Galaviz: Denies chest pain or shortness of breath this morning. States he is feeling a lot better  OBJECTIVE: No apparent distress, sitting upright in side of bed    ROS:  Consist: Denies fevers, chills or night sweats  Heart: Denies chest pain, palpitations, lightheadedness, dizziness or syncope  Lungs: Denies SOB, cough, wheezing, orthopnea or PND  GI: Denies abdominal pain, vomiting or diarrhea    PHYSICAL EXAM:   /72   Pulse 80   Temp 98.4 °F (36.9 °C) (Oral)   Resp 20   Ht 5' 8\" (1.727 m)   Wt 242 lb (109.8 kg)   SpO2 95%   BMI 36.80 kg/m²      CONST:  Well developed, obese  male who appears his stated age. Awake, alert, cooperative, no apparent distress  HEENT:   Head- Normocephalic, atraumatic   Eyes- Conjunctivae pink, anicteric  Throat- Oral mucosa pink and moist  Neck-  No stridor, trachea midline, no jugular venous distention. CHEST: Chest symmetrical and non-tender to palpation. RESPIRATORY: Diminished breath sounds bilaterally. No wheeze or rhonchi noted  CARDIOVASCULAR:     No carotid bruit noted bilaterally   Heart Ausculation- Regular rate and rhythm, no murmur. PV: No lower extremity edema. No varicosities. ABDOMEN: Soft, non-tender to light palpation. Bowel sounds present. MS: Good muscle strength and tone. No atrophy or abnormal movements. : Deferred  SKIN: Warm and dry no statis dermatitis or ulcers   NEURO / PSYCH: Oriented to person, place and time. Speech clear and appropriate. Follows all commands.  Pleasant affect       Intake/Output Summary (Last 24 hours) at 4/1/2022 0948  Last data filed at 4/1/2022 0948  Gross per 24 hour   Intake --   Output 1000 ml   Net -1000 ml       Weight:   Wt Readings from Last 3 Encounters:   04/01/22 242 lb (109.8 kg)   03/16/22 240 lb (108.9 kg)   03/10/22 248 lb 8 oz (112.7 kg) Current Inpatient Medications:   aspirin EC  81 mg Oral Daily    metoprolol succinate  50 mg Oral Daily    pantoprazole  40 mg Oral Daily    rosuvastatin  40 mg Oral Nightly    sodium chloride flush  5-40 mL IntraVENous 2 times per day    enoxaparin  40 mg SubCUTAneous Daily    insulin lispro  0-12 Units SubCUTAneous TID     insulin lispro  0-6 Units SubCUTAneous Nightly    bumetanide  1 mg IntraVENous BID    sacubitril-valsartan  1 tablet Oral BID    spironolactone  25 mg Oral Daily    clopidogrel  75 mg Oral Daily       IV Infusions (if any):   dextrose      sodium chloride         DIAGNOSTIC/ LABORATORY DATA:  Labs:   CBC:   Recent Labs     03/31/22  0746 04/01/22  0510   WBC 7.6 7.5   HGB 13.4 13.7   HCT 38.5 39.8    261     BMP:   Recent Labs     03/31/22  0746 04/01/22  0510    139   K 3.9 3.8   CO2 21* 27   BUN 9 10   CREATININE 0.6* 0.7   LABGLOM >60 >60   CALCIUM 8.8 9.2     Mag:   Recent Labs     04/01/22  0510   MG 1.9     TSH:   Recent Labs     03/31/22  1733   TSH 4.710*     HgA1c:   Lab Results   Component Value Date    LABA1C 6.9 (H) 03/31/2022     FASTING LIPID PANEL:  Lab Results   Component Value Date    CHOL 213 12/18/2010    HDL 40.0 12/18/2010    LDLCALC 135 12/18/2010    TRIG 191 12/18/2010     LIVER PROFILE:  Recent Labs     03/31/22  0746   AST 13   ALT 16   LABALBU 3.9       CXR 3/31/2022  Opacity may represents pulmonary edema in a patient with cardiac history. Superimposed atelectasis, or pneumonia may be considered in the proper clinical setting.       CTA 3/31/2022  1. There is no pulmonary embolus. 2. Patchy bilateral pneumonia more prominent within the right and left lower lobes  3. Small to moderate bilateral pleural effusions, right greater than left  4. Reactive lymphadenopathy within the right hilum      Telemetry: Sinus rhythm      TTE 1/21/22 René Jackson:   Summary  difficult visualization. Contrast used.  moderate global hypokinesis.  Inferior akinesis.   Severely reduced left ventricular systolic function. EF 29%  Stage I diastolic dysfunction.  Trace to mild mitral regurgitation. [facility] 3/16/2022 Kalyn Kaufman): Angiographic Results/findings: Left Main: No  angiographically significant stenosis. LAD: Ostial diffuse eccentric 50% stenosis.  Mid long diffuse 80% stenosis. D1: Mild luminal irregularities. D2: No angiographically significant stenosis. Andrés Ortega Cx: Codominant vessel.  No angiographically significant stenosis. .  OM1: Large vessel.  Mid mild luminal irregularities. Srinivasa Meraz is a small lateral branch that is a 1.8 to 2 mm vessel.  This has an ostial 99% subtotal occlusion.  Not amenable to PCI. Andrés Ortega OM2: 2.0 mm vessel.  Mid diffuse 90% stenosis. OM 3: Tiny vessel. OM 4: No angiographically significant stenosis. OM 5: (PDA) no angiographically significant stenosis. . Ramus: Absent. RCA: Mid diffuse eccentric  80% stenosis. . PDA: Off of the circumflex as above. . PLB: No angiographically significant stenosis. .   Interventional results: Mid LAD lesion PrePCI AGUSTIN 3 flow. Successful predilatation of the mid LAD lesion with a 2.0 mm balloon.  This lesion was then crossed and stented with a 2.5 x 22 mm drug-eluting stent to 17 atmospheres of pressure.  This resulted in 0 percent residual stenosis with AGUSTIN-3 flow.    OM2 lesion PrePCI AGUSTIN 3 flow. Successful predilatation of the OM2 lesion with a 2.0 mm balloon.  This lesion was then crossed and stented with a 2.0 x 12 mm drug-eluting stent to 14 atmospheres of pressure.  This resulted in 0 percent residual stenosis with AGUSTIN-3 flow.      RCA lesion Pre-PCI AGUSTIN-3 flow. Predilatation with a 3.0 mm balloon.  Successful stenting of the mid RCA lesion with a 3.25 x 15 mm drug-eluting stent.  This was postdilated with a 3.25 mm noncompliant balloon to 18 shannon pressure.  This resulted in 0% residual stenosis and AGUSTIN-3 flow.     Percutaneous coronary artery intervention of the mid LAD with a 2.5 x 22 mm drug-eluting stent. 3.  Percutaneous coronary artery intervention of the OM2 with a 2.0 x 12 mm drug-eluting stent. 4.  Percutaneous coronary intervention of the mid RCA with a 3.25 x 15 mm drug-eluting stent. 5.  iFR of the ostial LAD. Assessment:  1. Acute on chronic HFrEF (EF 30% 1/21/2022). proBNP 2055. IV diuresing. 2. Ischemic cardiomyopathy with recent ELIZABETH-mid LAD, OM2, RCA 3/16/2022 (IFR ostial LAD for diffuse eccentric 50% stenosis). DAPT  changed from aspirin/Brilinta to aspirin/Plavix per PCP as outpatient secondary to shortness of breath. 3. Bilateral pleural effusions R > L   4. Patchy bilateral pneumonia, on antibiotics  5. Hypertension, not adequately controlled  6. Hyperlipidemia on statin  7. Type 2 diabetes mellitus  8. Obstructive sleep apnea, not treated  9. GERD  10. Obesity  11. Tobacco abuse, recent cessation 3/20/2022. 12. History of a cerebral meningioma s/p resection. Plan:  1. Continue IV diuresis. Strict I&O, daily BMP  2. GDMT: Increase Toprol XL. Continue Entresto and Aldactone  3. Limited TTE to assess LV function. If remains < / = 35%  will need LifeVest upon discharge. 4. Heart failure nurse coordinator to see the patient for referral to outpatient CHF clinic  5. Aggressive risk factor modification including diet exercise and weight loss discussed with the patient  6. Continued tobacco cessation importance discussed with the patient  7. We will follow    Electronically signed by Qiana Love on 4/1/2022 at 9:48 AM      I have personally seen and evaluated the patient. I personally obtained the history and performed the physical exam.  They are currently pain free. I personally reviewed all of the above labs and data. All of the above assessments and recommendations are from me. All of the above cardiac medical decisions are from me. He was sitting on the side of the bed. He was comfortable and in no distress.   He states his breathing is improving but he still has not been able to lie flat. ENT: clear, no bleeding. No external masses. Lips normal formation. Neck: Thick and difficult to examine. Supple, full ROM, no JVD, no bruits, no lymphadenopathy. No masses. trachea midline. Heart: regular rate & rhythm, normal S1 & S2, I/VI (normal physiologic) systolic murmur, S4 gallop. No heave. Lungs: Poor air movement. Bibasilar rales. No accessory muscles. Abd: soft, non-tender. Normal bowel sounds. Neuro: Full ROM X 4, EOMI, no tremors. EXT: Mild bilateral lower extremity edema  Skin: warm, dry, intact. Good turgor. Assessment/recommendations:  1. Acute on chronic HFrEF. Ischemic cardiomyopathy. Ejection fraction 30%. 2. Pulmonary edema and pleural effusions due to the above. Improving but he is still hypervolemic today. 3. Replace potassium. 4. Pneumonia. 5. Hypertension. Improving but still elevated. Just started on new medications yesterday and diuresing. Increase Toprol today. 6. Diabetes  7. Sleep apnea. Not treated. 8. GERD. 9. Obesity. 10. Cerebral meningioma. 11. Titrate medications as tolerated. Continue IV diuresis. Replace potassium today. LifeVest.      Electronically signed by Swapna Hazel DO on 4/1/2022 at 2:46 PM    Note: This report was completed using computerized voice recognition software. Every effort has been made to ensure accuracy, however; and invert and computerized transcription errors may be present.

## 2022-04-02 VITALS
DIASTOLIC BLOOD PRESSURE: 78 MMHG | OXYGEN SATURATION: 96 % | BODY MASS INDEX: 35.77 KG/M2 | SYSTOLIC BLOOD PRESSURE: 139 MMHG | RESPIRATION RATE: 18 BRPM | TEMPERATURE: 97.5 F | HEART RATE: 85 BPM | WEIGHT: 236 LBS | HEIGHT: 68 IN

## 2022-04-02 LAB
ANION GAP SERPL CALCULATED.3IONS-SCNC: 12 MMOL/L (ref 7–16)
BUN BLDV-MCNC: 10 MG/DL (ref 6–23)
CALCIUM SERPL-MCNC: 9.5 MG/DL (ref 8.6–10.2)
CHLORIDE BLD-SCNC: 99 MMOL/L (ref 98–107)
CO2: 27 MMOL/L (ref 22–29)
CREAT SERPL-MCNC: 0.8 MG/DL (ref 0.7–1.2)
GFR AFRICAN AMERICAN: >60
GFR NON-AFRICAN AMERICAN: >60 ML/MIN/1.73
GLUCOSE BLD-MCNC: 140 MG/DL (ref 74–99)
MAGNESIUM: 1.9 MG/DL (ref 1.6–2.6)
METER GLUCOSE: 144 MG/DL (ref 74–99)
POTASSIUM REFLEX MAGNESIUM: 4.3 MMOL/L (ref 3.5–5)
POTASSIUM SERPL-SCNC: 4.3 MMOL/L (ref 3.5–5)
PRO-BNP: 760 PG/ML (ref 0–125)
SODIUM BLD-SCNC: 138 MMOL/L (ref 132–146)

## 2022-04-02 PROCEDURE — 36415 COLL VENOUS BLD VENIPUNCTURE: CPT

## 2022-04-02 PROCEDURE — 6370000000 HC RX 637 (ALT 250 FOR IP): Performed by: INTERNAL MEDICINE

## 2022-04-02 PROCEDURE — 6360000002 HC RX W HCPCS: Performed by: NURSE PRACTITIONER

## 2022-04-02 PROCEDURE — 83735 ASSAY OF MAGNESIUM: CPT

## 2022-04-02 PROCEDURE — 80048 BASIC METABOLIC PNL TOTAL CA: CPT

## 2022-04-02 PROCEDURE — 2580000003 HC RX 258: Performed by: NURSE PRACTITIONER

## 2022-04-02 PROCEDURE — 6370000000 HC RX 637 (ALT 250 FOR IP): Performed by: PHYSICIAN ASSISTANT

## 2022-04-02 PROCEDURE — 83880 ASSAY OF NATRIURETIC PEPTIDE: CPT

## 2022-04-02 PROCEDURE — 99233 SBSQ HOSP IP/OBS HIGH 50: CPT | Performed by: INTERNAL MEDICINE

## 2022-04-02 PROCEDURE — 82962 GLUCOSE BLOOD TEST: CPT

## 2022-04-02 PROCEDURE — 6370000000 HC RX 637 (ALT 250 FOR IP): Performed by: NURSE PRACTITIONER

## 2022-04-02 PROCEDURE — 2500000003 HC RX 250 WO HCPCS: Performed by: INTERNAL MEDICINE

## 2022-04-02 PROCEDURE — 2700000000 HC OXYGEN THERAPY PER DAY

## 2022-04-02 RX ORDER — SPIRONOLACTONE 25 MG/1
25 TABLET ORAL DAILY
Qty: 30 TABLET | Refills: 3 | Status: SHIPPED | OUTPATIENT
Start: 2022-04-03 | End: 2022-11-02

## 2022-04-02 RX ORDER — SPIRONOLACTONE 25 MG/1
25 TABLET ORAL DAILY
Qty: 30 TABLET | Refills: 3 | Status: SHIPPED | OUTPATIENT
Start: 2022-04-03 | End: 2022-04-02

## 2022-04-02 RX ORDER — BUMETANIDE 1 MG/1
1 TABLET ORAL 2 TIMES DAILY
Qty: 30 TABLET | Refills: 3 | Status: SHIPPED | OUTPATIENT
Start: 2022-04-02

## 2022-04-02 RX ORDER — BUMETANIDE 1 MG/1
1 TABLET ORAL 2 TIMES DAILY
Qty: 30 TABLET | Refills: 3 | Status: SHIPPED | OUTPATIENT
Start: 2022-04-02 | End: 2022-04-02 | Stop reason: SDUPTHER

## 2022-04-02 RX ADMIN — METOPROLOL SUCCINATE 100 MG: 100 TABLET, EXTENDED RELEASE ORAL at 08:51

## 2022-04-02 RX ADMIN — SPIRONOLACTONE 25 MG: 25 TABLET ORAL at 08:48

## 2022-04-02 RX ADMIN — SACUBITRIL AND VALSARTAN 1 TABLET: 24; 26 TABLET, FILM COATED ORAL at 08:48

## 2022-04-02 RX ADMIN — PANTOPRAZOLE SODIUM 40 MG: 40 TABLET, DELAYED RELEASE ORAL at 08:48

## 2022-04-02 RX ADMIN — BUMETANIDE 1 MG: 0.25 INJECTION, SOLUTION INTRAMUSCULAR; INTRAVENOUS at 08:48

## 2022-04-02 RX ADMIN — CLOPIDOGREL BISULFATE 75 MG: 75 TABLET ORAL at 08:49

## 2022-04-02 RX ADMIN — SODIUM CHLORIDE, PRESERVATIVE FREE 10 ML: 5 INJECTION INTRAVENOUS at 08:48

## 2022-04-02 RX ADMIN — ASPIRIN 81 MG: 81 TABLET, COATED ORAL at 08:49

## 2022-04-02 ASSESSMENT — PAIN SCALES - GENERAL
PAINLEVEL_OUTOF10: 0
PAINLEVEL_OUTOF10: 0

## 2022-04-02 NOTE — DISCHARGE SUMMARY
[facility]   Discharge summary   Patient ID:  Mikhail Mcgee  [id number]  64 y.o.  1960    Admit date: 3/31/2022    Discharge date and time: 4/2/2022    Admission Diagnoses:   Patient Active Problem List   Diagnosis    Disorder of bone and cartilage    [facility] meninges [facility]    Lumbar degenerative disc disease, advanced, multilevel    Cerebral [facility]ioma ([facility]    S/P resection of cerebral meningioma    Lumbar disc herniation    Hernia    Chronic back pain    S/P lumbar laminectomy    Headache    CAD in native artery    Fluid overload    Congestive heart failure (CHF) ([facility]       Discharge Diagnoses: Volume overload status post recent heart cath/PCI in patient with known history of coronary artery disease/ischemic cardiomyopathy    Consults: Cardiology    Procedures: None    Hospital Course:     58-year-old male with recent left heart cath/PCI on 3/18/2022, has been compliant with his home medications-presented with complaints of worsening shortness of breath especially with movement and ambulation-admitted to telemetry unit with     Overt systolic congestive heart failure with volume overload  -Diurese cautiously so as to avoid renal insufficiency  -BUN/Creat:  10/0.7  -Daily weights strict I's and O's  -Low-sodium diet  -Supplement O2 to maintain pulse ox duration greater than 93% wean as tolerated  -Cardiology following - appreciate input  -Echo - 1/21/2022 - EF 92%, stage 1 diastolic dysfunction-patient on goal-directed medical therapy including Entresto, Bumex, beta-blocker, Aldactone-check BMP early next week with PCP to ensure no acute renal failure from these medications  -Smoking cessation  -Patient will possibly need life vest prior to discharge.   Severely reduced LV-will defer need for LifeVest to cardiology team-they have okayed his discharge  -Diet modifications discussed with patient.  -Continue goal-directed medical therapy for ischemic cardiomyopathy  -Ambulate in room and check ambulating pulse ox, increase in diuretic as warranted       Recent Labs     03/31/22  0746 04/01/22  0510   WBC 7.6 7.5   HGB 13.4 13.7   HCT 38.5 39.8    261       Recent Labs     03/31/22  0746 03/31/22  0746 04/01/22  0510 04/02/22  0540     --  139 138   K 3.9   < > 3.8 4.3  4.3     --  101 99   CO2 21*  --  27 27   BUN 9  --  10 10   CREATININE 0.6*  --  0.7 0.8   CALCIUM 8.8  --  9.2 9.5    < > = values in this interval not displayed. Echo Limited    Result Date: 4/1/2022  Transthoracic Echocardiography Report (TTE)  Demographics   Patient Name        Heaven Ernandez Gender               Male   Medical Record      [id number]       Room Number          3326  Number   Account #           [de-identified]      Procedure Date       04/01/2022   Corporate ID                       Ordering Physician   Muriel Lewis DO   Accession Number    [id number]     Referring Physician   Date of Birth       1960     Sonographer          Samuel Tyson Jose [place], [place]   Age                 64 year(s)     Interpreting         Muriel Lewis DO                                     Physician                                      Any Other  Procedure Type of Study   TTE procedure:Echo Limited Study. Procedure Date Date: 04/01/2022 Start: 02:56 PM Study Location: Portable Technical Quality: Adequate visualization Indications:LV function. Patient Status: Routine Contrast Medium: Definity. Height: 68 inches Weight: 242 pounds BSA: 2.22 m^2 BMI: 36.8 kg/m^2  Findings   Left Ventricle  Severly dilated left ventricle. Severe global hypokinesis with posterior akinesis. Ejection fraction is visually estimated at 30%. Severely reduced left ventricular systolic function. Mitral Valve  Focally calcified mitral valve leaflets. Mild mitral regurgitation. Conclusions   Summary  Severely reduced left ventricular systolic function. Mild mitral regurgitation.    Signature ----------------------------------------------------------------  Electronically signed by Soumya Finch DO (Interpreting  physician) on 04/01/2022 07:34 PM  ----------------------------------------------------------------  M-Mode/2D Measurements & Calculations   LV Diastolic     LV Systolic Dimension: 5.1 cm      LA Dimension: 5.1 cm  Dimension: 6 cm  LV Volume Diastolic: 320.2 ml  LV EN:67 %       LV Volume Systolic: 142.0 ml  LV PW Diastolic: LV EDV/LV EDV Index: 176.7 ml/80  1.1 cm           ml/m^2LV ESV/LV ESV Index: 356.8   RV Diastolic  LV PW Systolic:  EJ/53CV/ m^2                       Dimension: 3.6 cm  1.1 cm           EF Calculated: 28.4 %  Septum           LV Mass Index: 118 l/min*m^2  Diastolic: 1 cm  LV Length: 8.9 cm                  LA volume/Index: 328.2  Septum Systolic:                                    ml /64. 67ml/m^2  1. 1 cm   LV Mass: 263.05  g  [email or web address] [id number]    XR CHEST PORTABLE    Result Date: 3/31/2022  EXAMINATION: ONE XRAY VIEW OF THE CHEST 3/31/2022 8:16 am COMPARISON: Chest radiograph April 30, 2014 HISTORY: ORDERING SYSTEM PROVIDED HISTORY: SOB, bibasilar rhonchi, history of PCI status post stenting, low ejection fraction TECHNOLOGIST PROVIDED HISTORY: Reason for exam:->SOB, bibasilar rhonchi What reading provider will be dictating this exam?->CRC FINDINGS: Airspace opacity identified bilateral mid to lower lungs. Bilateral airspace opacity may represent there is no effusion or pneumothorax. The cardiomediastinal silhouette is without acute process. The osseous structures are without acute process. Opacity may represents pulmonary edema in a patient with cardiac history. Superimposed atelectasis, or pneumonia may be considered in the proper clinical setting.      CTA PULMONARY W CONTRAST    Result Date: 3/31/2022  EXAMINATION: CTA OF THE CHEST 3/31/2022 right hilum       Discharge Exam:    HEENT: NCAT,  PERRLA, No JVD  Heart:  RRR, no murmurs, gallops, or rubs.   Lungs:  CTA bilaterally, no wheeze, rales or rhonchi  Abd: bowel sounds present, nontender, nondistended, no masses  Extrem:  No clubbing, cyanosis, or edema    Disposition: home     Patient Condition at Discharge: Stable    Patient Instructions:      Medication List      START taking these medications    bumetanide 1 MG tablet  Commonly known as: Bumex  Take 1 tablet by mouth 2 times daily     sacubitril-valsartan 24-26 MG per tablet  Commonly known as: ENTRESTO  Take 1 tablet by mouth 2 times daily     spironolactone 25 MG tablet  Commonly known as: ALDACTONE  Take 1 tablet by mouth daily  Start taking on: April 3, 2022        CHANGE how you take these medications    rosuvastatin 40 MG tablet  Commonly known as: CRESTOR  Take 1 tablet by mouth nightly  What changed: how much to take        CONTINUE taking these medications    aspirin EC 81 MG EC tablet  Take 1 tablet by mouth daily     clopidogrel 75 MG tablet  Commonly known as: PLAVIX     co-enzyme Q-10 30 MG Caps capsule     famotidine 40 MG tablet  Commonly known as: PEPCID     metFORMIN 500 MG extended release tablet  Commonly known as: GLUCOPHAGE-XR  Take 2 tablets by mouth daily (with breakfast)     metoprolol succinate 50 MG extended release tablet  Commonly known as: TOPROL XL  Take 1 tablet by mouth daily     pantoprazole 40 MG tablet  Commonly known as: PROTONIX     SOLUBLE FIBER/PROBIOTICS PO     tiZANidine 4 MG tablet  Commonly known as: ZANAFLEX     zolpidem 10 MG tablet  Commonly known as: AMBIEN        STOP taking these medications    ticagrelor 90 MG Tabs tablet  Commonly known as: BRILINTA           Where to Get Your Medications      These medications were sent to [address] [place] [address] [phone]  [facility], [address] [address]    Phone: [phone] · bumetanide 1 MG tablet  · sacubitril-valsartan 24-26 MG per tablet  · spironolactone 25 MG tablet       Activity: activity as tolerated  Diet: cardiac diet    Pt has been advised to: Follow-up with Papito You MD in 1 week.   Follow-up with consultants as recommended by them    Note that over 30 minutes was spent in preparing discharge papers, discussing discharge with patient, medication review, etc.    Signed:  Hever Daniel MD  4/2/2022  4:40 PM

## 2022-04-02 NOTE — PROGRESS NOTES
Inpatient Cardiology Progress note     PATIENT IS BEING FOLLOWED FOR: CHF    Clari Reeves is a 64 y.o. male who follows with Dr. Cathleen Mccloud: Reports marked improvement of edema, orthopnea and shortness of breath since diuresis was initiated. He reports he has lost significant amount of weight and states he feels better and would like to be discharged home today. He reports he has quit smoking and also watching salt and not using salt currently but using MrsKarthik Schaffer     OBJECTIVE: He has no apparent distress and laying down in bed flat without any shortness of breath or dyspnea orthopnea. ROS:  Consist: Denies fevers, chills or night sweats  Heart: Denies chest pain, palpitations, lightheadedness, dizziness or syncope  Lungs: Denies SOB, cough, wheezing, orthopnea or PND  GI: Denies abdominal pain, vomiting or diarrhea    PHYSICAL EXAM:   BP (!) 148/77   Pulse 89   Temp 97.7 °F (36.5 °C) (Oral)   Resp 18   Ht 5' 8\" (1.727 m)   Wt 236 lb (107 kg)   SpO2 96%   BMI 35.88 kg/m²      CONST:  Well developed, obese  male who appears his stated age. Awake, alert, cooperative, no apparent distress  HEENT:   Head- Normocephalic, atraumatic   Eyes- Conjunctivae pink, anicteric  Throat- Oral mucosa pink and moist  Neck-  No stridor, trachea midline, no jugular venous distention. CHEST: Chest symmetrical and non-tender to palpation. RESPIRATORY: Diminished breath sounds bilaterally. No wheeze or rhonchi noted  CARDIOVASCULAR:     No carotid bruit noted bilaterally   Heart Ausculation- Regular rate and rhythm, no murmur. PV: No lower extremity edema. No varicosities. ABDOMEN: Soft, non-tender to light palpation. Bowel sounds present. MS: Good muscle strength and tone. No atrophy or abnormal movements. : Deferred  SKIN: Warm and dry no statis dermatitis or ulcers   NEURO / PSYCH: Oriented to person, place and time. Speech clear and appropriate. Follows all commands.  Pleasant affect Intake/Output Summary (Last 24 hours) at 4/2/2022 1316  Last data filed at 4/2/2022 0849  Gross per 24 hour   Intake 550 ml   Output 2790 ml   Net -2240 ml       Weight:   Wt Readings from Last 3 Encounters:   04/02/22 236 lb (107 kg)   03/16/22 240 lb (108.9 kg)   03/10/22 248 lb 8 oz (112.7 kg)     Current Inpatient Medications:   metoprolol succinate  100 mg Oral Daily    aspirin EC  81 mg Oral Daily    pantoprazole  40 mg Oral Daily    rosuvastatin  40 mg Oral Nightly    sodium chloride flush  5-40 mL IntraVENous 2 times per day    enoxaparin  40 mg SubCUTAneous Daily    insulin lispro  0-12 Units SubCUTAneous TID WC    insulin lispro  0-6 Units SubCUTAneous Nightly    bumetanide  1 mg IntraVENous BID    sacubitril-valsartan  1 tablet Oral BID    spironolactone  25 mg Oral Daily    clopidogrel  75 mg Oral Daily       IV Infusions (if any):   dextrose      sodium chloride         DIAGNOSTIC/ LABORATORY DATA:  Labs:   CBC:   Recent Labs     03/31/22  0746 04/01/22  0510   WBC 7.6 7.5   HGB 13.4 13.7   HCT 38.5 39.8    261     BMP:   Recent Labs     04/01/22  0510 04/02/22  0540    138   K 3.8 4.3  4.3   CO2 27 27   BUN 10 10   CREATININE 0.7 0.8   LABGLOM >60 >60   CALCIUM 9.2 9.5     Mag:   Recent Labs     04/01/22  0510 04/02/22  0540   MG 1.9 1.9     TSH:   Recent Labs     03/31/22  1733   TSH 4.710*     HgA1c:   Lab Results   Component Value Date    LABA1C 6.9 (H) 03/31/2022     FASTING LIPID PANEL:  Lab Results   Component Value Date    CHOL 213 12/18/2010    HDL 40.0 12/18/2010    LDLCALC 135 12/18/2010    TRIG 191 12/18/2010     LIVER PROFILE:  Recent Labs     03/31/22  0746   AST 13   ALT 16   LABALBU 3.9       CXR 3/31/2022  Opacity may represents pulmonary edema in a patient with cardiac history. Superimposed atelectasis, or pneumonia may be considered in the proper clinical setting.       CTA 3/31/2022  1. There is no pulmonary embolus.   2. Patchy bilateral pneumonia more prominent within the right and left lower lobes  3. Small to moderate bilateral pleural effusions, right greater than left  4. Reactive lymphadenopathy within the right hilum      Telemetry: Sinus rhythm      TTE 1/21/22 Breanna Geeu:   Summary  difficult visualization. Contrast used.  moderate global hypokinesis. Inferior akinesis.   Severely reduced left ventricular systolic function. EF 12%  Stage I diastolic dysfunction.  Trace to mild mitral regurgitation. [facility] 3/16/2022 Tate Stephenson): Angiographic Results/findings: Left Main: No  angiographically significant stenosis. LAD: Ostial diffuse eccentric 50% stenosis.  Mid long diffuse 80% stenosis. D1: Mild luminal irregularities. D2: No angiographically significant stenosis. [place] Cx: Codominant vessel.  No angiographically significant stenosis. .  OM1: Large vessel.  Mid mild luminal irregularities. Arslan Surekha is a small lateral branch that is a 1.8 to 2 mm vessel.  This has an ostial 99% subtotal occlusion.  Not amenable to PCI. [place] Hilt OM2: 2.0 mm vessel.  Mid diffuse 90% stenosis. OM 3: Tiny vessel. OM 4: No angiographically significant stenosis. OM 5: (PDA) no angiographically significant stenosis. . Ramus: Absent. RCA: Mid diffuse eccentric  80% stenosis. . PDA: Off of the circumflex as above. . PLB: No angiographically significant stenosis. .   Interventional results: Mid LAD lesion PrePCI AGUSTIN 3 flow. Successful predilatation of the mid LAD lesion with a 2.0 mm balloon.  This lesion was then crossed and stented with a 2.5 x 22 mm drug-eluting stent to 17 atmospheres of pressure.  This resulted in 0 percent residual stenosis with AGUSTIN-3 flow.    OM2 lesion PrePCI AGUSTIN 3 flow. Successful predilatation of the OM2 lesion with a 2.0 mm balloon.  This lesion was then crossed and stented with a 2.0 x 12 mm drug-eluting stent to 14 atmospheres of pressure.  This resulted in 0 percent residual stenosis with AGUSTIN-3 flow.      RCA lesion Pre-PCI AGUSTIN-3 flow.  Predilatation with a 3.0 mm balloon.  Successful stenting of the mid RCA lesion with a 3.25 x 15 mm drug-eluting stent.  This was postdilated with a 3.25 mm noncompliant balloon to 18 shannon pressure.  This resulted in 0% residual stenosis and AGUSTIN-3 flow.     Percutaneous coronary artery intervention of the mid LAD with a 2.5 x 22 mm drug-eluting stent. 3.  Percutaneous coronary artery intervention of the OM2 with a 2.0 x 12 mm drug-eluting stent. 4.  Percutaneous coronary intervention of the mid RCA with a 3.25 x 15 mm drug-eluting stent. 5.  iFR of the ostial LAD. Assessment:  1. Acute on chronic HFrEF (EF 30% April 1, 2022 ). He report he has diuresed very well and is at baseline. He would like to go home today and from cardiovascular perspective this is okay if okay with primary service and other services. He report he has his LifeVest at the bedside  Please change Bumex from IV to p.o. Continue on current dose of metoprolol succinate, Entresto, and Aldactone and follow-up with cardiology for further up titration of guideline directed medical therapy  Follow low-salt diet  Daily weight and monitor in and out and communicate with you cardiologist if gaining weight or evolving edema    2. Ischemic cardiomyopathy with recent ELIZABETH-mid LAD, OM2, RCA 3/16/2022 (IFR ostial LAD for diffuse eccentric 50% stenosis). DAPT  changed from aspirin/Brilinta to aspirin/Plavix per PCP as outpatient secondary to shortness of breath. Continue beta-blocker and statin therapy  Follow-up with your cardiologist in the outpatient as mentioned above    3. Bilateral pleural effusions R > L   Management per primary and others  4. Patchy bilateral pneumonia, on antibiotics  Management per primary and others  5. Hypertension, not adequately controlled  Can increase Entresto to medium intensity and if this is done patient will need a repeat BMP tomorrow if staying today and if going home BMP next week to monitor kidney function    6.  Hyperlipidemia on statin  7. Type 2 diabetes mellitus  Management per primary service  8. Obstructive sleep apnea  CPAP is highly recommended  9. GERD  10. Obesity  11. Tobacco abuse, recent cessation 3/20/2022. Continue smoking cessation  12. History of a cerebral meningioma s/p resection. Electronically signed by Eliseo Rodriguez MD on 4/2/2022 at 1:16 PM    Note: This report was completed using computerized voice recognition software. Every effort has been made to ensure accuracy, however; and invert and computerized transcription errors may be present.

## 2022-04-02 NOTE — PLAN OF CARE
Problem: Falls - Risk of:  Goal: Will remain free from falls  Description: Will remain free from falls  Outcome: Met This Shift     Problem: Gas Exchange - Impaired:  Goal: Levels of oxygenation will improve  Description: Levels of oxygenation will improve  Outcome: Met This Shift     Problem: Fluid Volume:  Goal: Hemodynamic stability will improve  Description: Hemodynamic stability will improve  Outcome: Met This Shift  Goal: Ability to maintain a balanced intake and output will improve  Description: Ability to maintain a balanced intake and output will improve  Outcome: Met This Shift     Problem: Breathing Pattern - Ineffective:  Goal: Ability to achieve and maintain a regular respiratory rate will improve  Description: Ability to achieve and maintain a regular respiratory rate will improve  Outcome: Met This Shift     Problem: OXYGENATION/RESPIRATORY FUNCTION  Goal: Patient will maintain patent airway  Outcome: Met This Shift

## 2022-04-05 ENCOUNTER — TELEPHONE (OUTPATIENT)
Dept: CARDIOLOGY CLINIC | Age: 62
End: 2022-04-05

## 2022-04-12 ENCOUNTER — OFFICE VISIT (OUTPATIENT)
Dept: CARDIOLOGY CLINIC | Age: 62
End: 2022-04-12
Payer: MEDICARE

## 2022-04-12 VITALS
BODY MASS INDEX: 36.07 KG/M2 | RESPIRATION RATE: 16 BRPM | SYSTOLIC BLOOD PRESSURE: 102 MMHG | HEART RATE: 80 BPM | OXYGEN SATURATION: 98 % | HEIGHT: 68 IN | DIASTOLIC BLOOD PRESSURE: 78 MMHG | WEIGHT: 238 LBS

## 2022-04-12 DIAGNOSIS — G47.33 OSA (OBSTRUCTIVE SLEEP APNEA): ICD-10-CM

## 2022-04-12 DIAGNOSIS — I25.10 CAD IN NATIVE ARTERY: Primary | ICD-10-CM

## 2022-04-12 PROCEDURE — 93000 ELECTROCARDIOGRAM COMPLETE: CPT | Performed by: INTERNAL MEDICINE

## 2022-04-12 PROCEDURE — 99215 OFFICE O/P EST HI 40 MIN: CPT | Performed by: INTERNAL MEDICINE

## 2022-04-12 RX ORDER — EMPAGLIFLOZIN 10 MG/1
1 TABLET, FILM COATED ORAL DAILY
Qty: 30 TABLET | Refills: 5 | Status: SHIPPED
Start: 2022-04-12 | End: 2022-05-10

## 2022-04-12 RX ORDER — BUSPIRONE HYDROCHLORIDE 15 MG/1
7.5 TABLET ORAL 2 TIMES DAILY
COMMUNITY
Start: 2022-04-06

## 2022-04-12 NOTE — PROGRESS NOTES
OUTPATIENT CARDIOLOGY FOLLOW-UP    Name: Wendy Asa    Age: 64 y.o. Primary Care Physician: Dania Cody MD    Date of Service: 4/12/2022    Chief Complaint:   Chief Complaint   Patient presents with    Abnormal Test Results     Pt c/o terrible anxiety but no other complaints        Interim History:   Here for follow-up after left heart catheterization. Seen by me as new patient 3/10/2022 office visit for abnormal stress test showing fixed inferior defect with EF in the 20s, and echo showing an EF in the 35s. Heart catheterization 3/16/2022 subsequently revealed multivessel coronary disease and ended up getting stents to the mid LAD, OM 2, and RCA. He was admitted again 3/31/2022 for chest pain and shortness of breath and was treated for decompensated heart failure in the setting of accelerated hypertension and acute pulmonary edema. He was diuresed, GDMT was optimized and was recommended wearable cardioverter defibrillator upon discharge. He reports ongoing extreme anxiety. He also finds the past very uncomfortable. He is feeling better. He denies chest pain, shortness of breath, device shocks, palpitations. Interestingly he is found to be in new onset atrial fibrillation today. He is asking if there are any contraindication to him starting buspirone for his severe anxiety.     Review of Systems:   Negative except as described above    Past Medical History:  Past Medical History:   Diagnosis Date    Arthritis     ASK WHERE    Blood disorder     Bone marrow disorder     Bone spur     IN CERVICAL REGION    CAD in native artery 03/16/2022    Cerebral meningioma (HCC)     Cervical radiculopathy     CHF (congestive heart failure) (HCC)     Claustrophobia     Coronary artery disease involving native coronary artery     Diabetes mellitus (HCC)     Disc displacement, lumbar     Enlarged heart     Enlarged prostate     GERD (gastroesophageal reflux disease)     [facility]    HIGH. .. HAS F/U ON 12 WITH DR. BARBARA Ledbetter     Hyperlipidemia     Hypertension     Low back pain     Osteolytic lesion     Panic anxiety syndrome     S/P angioplasty with stent 2022    LAD, OM, RCA    Sleep apnea     awaiting [facility] back ordered       Past Surgical History:  Past Surgical History:   Procedure Laterality Date    BACK SURGERY  8/10/11    DR[facility] Janis Combs [facility]. .. FUSION    BRAIN SURGERY      CRANIECTOMY  3/22/10    RIGHT TEMPORAL REGION    CRANIOTOMY  5/10/10    RIGHT FRONTOTEMPORAL REGION   [address] [facility]    DR. RIVERS       Family History:  Family History   Problem Relation Age of Onset    Heart Disease Mother     Prostate Cancer Father        Social History:  Social History     Tobacco Use    Smoking status: Former Smoker     Packs/day: 1.00     Types: Cigarettes     Quit date: 3/10/2022     Years since quittin.0    Smokeless tobacco: Never Used   Vaping Use    Vaping Use: Never used   Substance Use Topics    Alcohol use: Yes     Comment: DRINKS BEER WEEKLY; UNKNOWN AMOUNT    Drug use: No        Allergies:   Allergies   Allergen Reactions    Adhesive Tape Rash       Current Medications:    Current Outpatient Medications:     apixaban (ELIQUIS) 5 MG TABS tablet, Take 1 tablet by mouth 2 times daily, Disp: 60 tablet, Rfl: 0    empagliflozin (JARDIANCE) 10 MG tablet, Take 1 tablet by mouth daily, Disp: 30 tablet, Rfl: 5    sacubitril-valsartan (ENTRESTO) 24-26 MG per tablet, Take 1 tablet by mouth 2 times daily, Disp: 60 tablet, Rfl: 3    bumetanide (BUMEX) 1 MG tablet, Take 1 tablet by mouth 2 times daily, Disp: 30 tablet, Rfl: 3    spironolactone (ALDACTONE) 25 MG tablet, Take 1 tablet by mouth daily, Disp: 30 tablet, Rfl: 3    clopidogrel (PLAVIX) 75 MG tablet, Take 75 mg by mouth daily, Disp: , Rfl:     co-enzyme Q-10 30 MG CAPS capsule, Take 200 mg by mouth 2 times daily, Disp: , Rfl:     metFORMIN (GLUCOPHAGE-XR) 500 MG extended release tablet, Take 2 tablets by mouth daily (with breakfast), Disp: 30 tablet, Rfl: 3    rosuvastatin (CRESTOR) 40 MG tablet, Take 1 tablet by mouth nightly, Disp: 30 tablet, Rfl: 0    famotidine (PEPCID) 40 MG tablet, Take 40 mg by mouth daily, Disp: , Rfl:     metoprolol succinate (TOPROL XL) 50 MG extended release tablet, Take 1 tablet by mouth daily, Disp: 30 tablet, Rfl: 0    tiZANidine (ZANAFLEX) 4 MG tablet, Take 4 mg by mouth every 6 hours as needed, Disp: , Rfl:     zolpidem (AMBIEN) 10 MG tablet, Take by mouth nightly as needed for Sleep., Disp: , Rfl:     Probiotic Product (SOLUBLE FIBER/PROBIOTICS PO), Take by mouth, Disp: , Rfl:     busPIRone (BUSPAR) 15 MG tablet, , Disp: , Rfl:     Physical Exam:  /78   Pulse 80   Resp 16   Ht 5' 8\" (1.727 m)   Wt 238 lb (108 kg)   SpO2 98%   BMI 36.19 kg/m²   Wt Readings from Last 3 Encounters:   04/12/22 238 lb (108 kg)   04/02/22 236 lb (107 kg)   03/16/22 240 lb (108.9 kg)     Appearance: Awake, alert and oriented x 3, no acute respiratory distress  Skin: Intact, no rash  Head: Normocephalic, atraumatic  Eyes: EOMI, no conjunctival erythema  ENMT: No pharyngeal erythema, MMM, no rhinorrhea  Neck: Supple, no elevated JVP, no carotid bruits  Lungs: Clear to auscultation bilaterally. No wheezes, rales, or rhonchi. Cardiac: Irregular rhythm with a normal rate, +S1S2, no murmurs apparent.   Wearable cardioverter defibrillator in place  Abdomen: Soft, nontender, +bowel sounds  Extremities: Moves all extremities x 4, no lower extremity edema  Neurologic: No focal motor deficits apparent, normal mood and affect, alert and oriented x 3  Peripheral Pulses: Intact posterior tibial pulses bilaterally    Laboratory Tests:  Lab Results   Component Value Date    CREATININE 0.8 04/02/2022    BUN 10 04/02/2022     04/02/2022    K 4.3 04/02/2022    K 4.3 04/02/2022    CL 99 04/02/2022    CO2 27 04/02/2022     Lab Results   Component Value Date MG 1.9 2022     Lab Results   Component Value Date    WBC 7.5 2022    HGB 13.7 2022    HCT 39.8 2022    MCV 95.4 2022     2022     Lab Results   Component Value Date    ALT 16 2022    AST 13 2022    ALKPHOS 54 2022    BILITOT 0.4 2022     Lab Results   Component Value Date    CKTOTAL 73 2010    CKMB 1.1 2010    TROPONINI <0.01 2015    TROPONINI 0.02 2010    TROPONINI 0.01 2010     Lab Results   Component Value Date    INR 1.1 03/15/2022    INR 1.0 2011    INR 1.0 2010    PROTIME 11.7 03/15/2022    PROTIME 11.6 2011    PROTIME 11.4 2010     Lab Results   Component Value Date    TSH 4.710 (H) 2022     Lab Results   Component Value Date    LABA1C 6.9 (H) 2022     No results found for: EAG  Lab Results   Component Value Date    CHOL 213 (H) 2010     Lab Results   Component Value Date    TRIG 191 (H) 2010     Lab Results   Component Value Date    HDL 40.0 (A) 2010     Lab Results   Component Value Date    LDLCALC 135 (H) 2010     No results found for: LABVLDL, VLDL  No results found for: CHOLHDLRATIO  No results for input(s): PROBNP in the last 72 hours. Cardiac Tests:  EC2022: Atrial fibrillation 80 bpm.  Normal axis and intervals. Prior anterior infarct. Nonspecific T wave changes    Echocardiogram:   TTE 22 Cee Overall     Summary   difficult visualization. Contrast used. moderate global hypokinesis. Inferior akinesis. EF 30%   Severely reduced left ventricular systolic function. Stage I diastolic dysfunction. Trace to mild mitral regurgitation. Limited TTE 2022 Floyd  Dilated LV, global hypokinesis with posterior akinesis  EF 30%  Mild MR    Stress test:    Pharmacologic stress 2021  Gated SPECT left ventricular ejection fraction was calculated to be 25%, with inferior hypokinesis.     Impression:    1.  ECG during the infusion did not change. 2. The myocardial perfusion imaging was abnormal.    The abnormality was a a large sized fixed defect in the inferior wall suggestive of a prior MI     3. Overall left ventricular systolic function was abnormal with regional wall motion abnormalities. 4. Intermediate risk general pharmacologic stress test.      Cardiac catheterization:   [facility]/PCI 3/16/22 Floyd    IFR of the ostial LAD: 0.95, 0.95, 0.94.     Angiographic Results/findings:  Left Main: No angiographically significant stenosis. LAD: Ostial diffuse eccentric 50% stenosis. Mid long diffuse 80% stenosis. D1: Mild luminal irregularities. D2: No angiographically significant stenosis. [place] Spinner Cx: Codominant vessel. No angiographically significant stenosis. .  OM1: Large vessel. Mid mild luminal irregularities. There is a small lateral branch that is a 1.8 to 2 mm vessel. This has an ostial 99% subtotal occlusion. Not amenable to PCI. Cherl Spinner OM2: 2.0 mm vessel. Mid diffuse 90% stenosis. OM 3: Tiny vessel. OM 4: No angiographically significant stenosis. OM 5: (PDA) no angiographically significant stenosis. .  [place]: Absent. RCA: Mid diffuse eccentric 80% stenosis. .  PDA: Off of the circumflex as above. .  PLB: No angiographically significant stenosis. #Percutaneous coronary artery intervention of the mid LAD with a 2.5 x 22 mm drug-eluting stent. #Percutaneous coronary artery intervention of the OM2 with a 2.0 x 12 mm drug-eluting stent. #Percutaneous coronary intervention of the mid RCA with a 3.25 x 15 mm drug-eluting stent. #iFR of the ostial LAD.     Orders Placed This Encounter   Procedures    Basic Metabolic Panel    Brain Natriuretic Peptide   [address]    EKG 12 Lead    Baseline diagnostic sleep study        Requested Prescriptions     Signed Prescriptions Disp Refills    apixaban (ELIQUIS) 5 MG TABS tablet 60 tablet 0     Sig: Take 1 tablet by mouth 2 times daily    empagliflozin (Jesse Hughes) 10 MG tablet 30 tablet 5     Sig: Take 1 tablet by mouth daily        ASSESSMENT / PLAN:  1. Multivessel CAD status post multivessel PCI to mLAD, OM2, mRCA as above  2. Ischemic cardiomyopathy. EF 25% by SPECT, 30% by echo. 3. Chronic HFrEF. He is euvolemic. Recent admission for decompensation  4. New onset atrial fibrillation. [id number] at least 4. Rate controlled  5. Hypertension, well controlled  6. Type 2 diabetes, on Metformin. A1c 6.9%  7. Hyperlipidemia, on ezetimibe. Statin intolerant  8. NISH, untreated  9. History of cerebral meningioma status post surgical resection  10. GERD  11. Chronic neck pain, back pain  12. Obesity  13. Tobacco abuse  14. Severe anxiety    Recommendations:  Appears well compensated today. He is in new onset atrial fibrillation of unknown duration.     · Discontinue aspirin  · Start apixaban 5 mg twice daily for stroke risk reduction  · Continue single antiplatelet therapy with clopidogrel  · Will check with [facility] to see if A. fib burden can be determined from his WCD  · EKG visit in 1 week, if remains in A. fib recommend BRONSON cardioversion  · Continue GDMT for cardiomyopathy, no room to uptitrate given low blood pressure  · Metoprolol succinate 50 mg daily  · Sacubitril/valsartan 24-26 mg twice daily  · Spironolactone 25 mg daily  · Recommend SGLT2 inhibitor  · Continue wearable cardioverter defibrillator for now  · Plan to reassess EF after at least 3 months of optimized GDMT, and if EF remains less than 35% will refer for ICD  · Continue bumetanide 1 mg twice daily  · Continue statin  · BMP and proBNP next week when he comes for EKG  · No contraindication from cardiac standpoint to use buspirone for anxiety  · Recommend sleep study  · Recommend cardiac rehab  · Aggressive risk factor modification, including smoking cessation recommended  · Follow-up in 1 month or sooner if need arises    Greater than 40-minutes spent on this encounter    The patient's current medication

## 2022-04-13 ENCOUNTER — TELEPHONE (OUTPATIENT)
Dept: CARDIOLOGY CLINIC | Age: 62
End: 2022-04-13

## 2022-04-13 NOTE — TELEPHONE ENCOUNTER
Contacted patient with recommendations per Dr. Reese Desai. Patient indicates he had a sleep study and is waiting for a machine to start treatment. He also does not wish to start any new medications at this time. he will contact us if he changes his mind. Lipid panel requested from Dr. Le Goes.    ----- Message from Hari Lawrence MD sent at 4/12/2022  4:47 PM EDT -----  Please let patient know there are no cardiac contraindications to the buspirone, he may take it from my standpoint. Please let him know I would also like him to have a sleep study, as atrial fibrillation can be related to untreated sleep apnea. I would also like to start him on empagliflozin, it is a diabetic medication that is also recommended in people with cardiomyopathy like he has and it is a very good medication for people with his heart condition. I have also referred him to cardiac rehab which is recommended for CHF patients. Finally can you see if he has had a fasting lipid panel done recently, if not he will need one. Lenny from [facility] is checking into the vest to see when he went into atrial fibrillation, I will likely hear back from him later today or tomorrow. Plan will still be for an EKG next week, and if still in A. fib will arrange BRONSON cardioversion.

## 2022-04-19 ENCOUNTER — TELEPHONE (OUTPATIENT)
Dept: CARDIOLOGY CLINIC | Age: 62
End: 2022-04-19

## 2022-04-19 ENCOUNTER — NURSE ONLY (OUTPATIENT)
Dept: CARDIOLOGY CLINIC | Age: 62
End: 2022-04-19
Payer: MEDICARE

## 2022-04-19 DIAGNOSIS — I25.10 CAD IN NATIVE ARTERY: Primary | ICD-10-CM

## 2022-04-19 DIAGNOSIS — I25.10 CAD IN NATIVE ARTERY: ICD-10-CM

## 2022-04-19 LAB
ANION GAP SERPL CALCULATED.3IONS-SCNC: 9 MMOL/L (ref 7–16)
BUN BLDV-MCNC: 16 MG/DL (ref 6–23)
CALCIUM SERPL-MCNC: 9.6 MG/DL (ref 8.6–10.2)
CHLORIDE BLD-SCNC: 96 MMOL/L (ref 98–107)
CO2: 28 MMOL/L (ref 22–29)
CREAT SERPL-MCNC: 1 MG/DL (ref 0.7–1.2)
GFR AFRICAN AMERICAN: >60
GFR NON-AFRICAN AMERICAN: >60 ML/MIN/1.73
GLUCOSE BLD-MCNC: 226 MG/DL (ref 74–99)
POTASSIUM SERPL-SCNC: 4.1 MMOL/L (ref 3.5–5)
PRO-BNP: 850 PG/ML (ref 0–125)
SODIUM BLD-SCNC: 133 MMOL/L (ref 132–146)

## 2022-04-19 PROCEDURE — 93000 ELECTROCARDIOGRAM COMPLETE: CPT | Performed by: INTERNAL MEDICINE

## 2022-04-19 NOTE — TELEPHONE ENCOUNTER
Contacted patient with recommendations per Dr. Ezra Brian. He verbalized understanding, however, he indicates he doesn't feel like he needs to be wearing the LifeVest as his watch can check his heart rate. He states he is going to return the device to [place] [id number].

## 2022-04-19 NOTE — TELEPHONE ENCOUNTER
Contacted patient with results and recommendations per Dr. Zia Zhao. He verbalized understanding. Patient has asked if he needs to continue wearing the LifeVest.  Please advise.    ----- Message from Ryland Fleischer, MD sent at 4/19/2022  9:49 AM EDT -----  EKG shows he is back in normal sinus rhythm. Continue apixaban + clopidogrel  Same meds otherwise  Follow up as scheduled.

## 2022-04-19 NOTE — PROGRESS NOTES
Patient was seen in the office today for an EKG per Nadia Forrester. Patient had blood work drawn in office. Patient had no issues and tolerated it well. Left Arm. Artemus Schilder MA.

## 2022-04-19 NOTE — RESULT ENCOUNTER NOTE
EKG shows he is back in normal sinus rhythm. Continue apixaban + clopidogrel  Same meds otherwise  Follow up as scheduled.

## 2022-04-19 NOTE — TELEPHONE ENCOUNTER
The decision whether to wear the vest is up to him. I have recommended it for primary prevention of sudden cardiac death, and recommend that he continue to wear it for now.

## 2022-04-29 ENCOUNTER — TELEPHONE (OUTPATIENT)
Dept: OTHER | Facility: CLINIC | Age: 62
End: 2022-04-29

## 2022-04-29 ENCOUNTER — TELEPHONE (OUTPATIENT)
Dept: CARDIOLOGY CLINIC | Age: 62
End: 2022-04-29

## 2022-04-29 ENCOUNTER — HOSPITAL ENCOUNTER (EMERGENCY)
Age: 62
Discharge: HOME | End: 2022-04-29
Attending: EMERGENCY MEDICINE
Payer: MEDICARE

## 2022-04-29 ENCOUNTER — APPOINTMENT (OUTPATIENT)
Dept: GENERAL RADIOLOGY | Age: 62
End: 2022-04-29
Payer: MEDICARE

## 2022-04-29 VITALS
DIASTOLIC BLOOD PRESSURE: 74 MMHG | OXYGEN SATURATION: 98 % | HEIGHT: 68 IN | RESPIRATION RATE: 16 BRPM | WEIGHT: 227 LBS | TEMPERATURE: 97.4 F | HEART RATE: 68 BPM | BODY MASS INDEX: 34.4 KG/M2 | SYSTOLIC BLOOD PRESSURE: 152 MMHG

## 2022-04-29 DIAGNOSIS — I50.9 ACUTE ON CHRONIC CONGESTIVE HEART FAILURE, UNSPECIFIED HEART FAILURE TYPE (HCC): Primary | ICD-10-CM

## 2022-04-29 DIAGNOSIS — I50.9 CONGESTIVE HEART FAILURE, UNSPECIFIED HF CHRONICITY, UNSPECIFIED HEART FAILURE TYPE (HCC): Primary | ICD-10-CM

## 2022-04-29 LAB
ALBUMIN SERPL-MCNC: 4.5 G/DL (ref 3.5–5.2)
ALP BLD-CCNC: 65 U/L (ref 40–129)
ALT SERPL-CCNC: 15 U/L (ref 0–40)
ANION GAP SERPL CALCULATED.3IONS-SCNC: 14 MMOL/L (ref 7–16)
AST SERPL-CCNC: 12 U/L (ref 0–39)
BASOPHILS ABSOLUTE: 0.02 E9/L (ref 0–0.2)
BASOPHILS RELATIVE PERCENT: 0.2 % (ref 0–2)
BILIRUB SERPL-MCNC: 0.3 MG/DL (ref 0–1.2)
BUN BLDV-MCNC: 17 MG/DL (ref 6–23)
CALCIUM SERPL-MCNC: 9.4 MG/DL (ref 8.6–10.2)
CHLORIDE BLD-SCNC: 95 MMOL/L (ref 98–107)
CO2: 23 MMOL/L (ref 22–29)
CREAT SERPL-MCNC: 0.8 MG/DL (ref 0.7–1.2)
EKG ATRIAL RATE: 69 BPM
EKG P AXIS: -17 DEGREES
EKG P-R INTERVAL: 188 MS
EKG Q-T INTERVAL: 396 MS
EKG QRS DURATION: 92 MS
EKG QTC CALCULATION (BAZETT): 424 MS
EKG R AXIS: -2 DEGREES
EKG T AXIS: 33 DEGREES
EKG VENTRICULAR RATE: 69 BPM
EOSINOPHILS ABSOLUTE: 0.09 E9/L (ref 0.05–0.5)
EOSINOPHILS RELATIVE PERCENT: 1 % (ref 0–6)
GFR AFRICAN AMERICAN: >60
GFR NON-AFRICAN AMERICAN: >60 ML/MIN/1.73
GLUCOSE BLD-MCNC: 224 MG/DL (ref 74–99)
HCT VFR BLD CALC: 36.7 % (ref 37–54)
HEMOGLOBIN: 13 G/DL (ref 12.5–16.5)
IMMATURE GRANULOCYTES #: 0.04 E9/L
IMMATURE GRANULOCYTES %: 0.5 % (ref 0–5)
LYMPHOCYTES ABSOLUTE: 2.02 E9/L (ref 1.5–4)
LYMPHOCYTES RELATIVE PERCENT: 23.1 % (ref 20–42)
MCH RBC QN AUTO: 32.8 PG (ref 26–35)
MCHC RBC AUTO-ENTMCNC: 35.4 % (ref 32–34.5)
MCV RBC AUTO: 92.7 FL (ref 80–99.9)
MONOCYTES ABSOLUTE: 0.51 E9/L (ref 0.1–0.95)
MONOCYTES RELATIVE PERCENT: 5.8 % (ref 2–12)
NEUTROPHILS ABSOLUTE: 6.07 E9/L (ref 1.8–7.3)
NEUTROPHILS RELATIVE PERCENT: 69.4 % (ref 43–80)
PDW BLD-RTO: 11.4 FL (ref 11.5–15)
PLATELET # BLD: 219 E9/L (ref 130–450)
PMV BLD AUTO: 10.7 FL (ref 7–12)
POTASSIUM REFLEX MAGNESIUM: 4.1 MMOL/L (ref 3.5–5)
PRO-BNP: 1350 PG/ML (ref 0–125)
RBC # BLD: 3.96 E12/L (ref 3.8–5.8)
SODIUM BLD-SCNC: 132 MMOL/L (ref 132–146)
TOTAL PROTEIN: 8.3 G/DL (ref 6.4–8.3)
TROPONIN, HIGH SENSITIVITY: 16 NG/L (ref 0–11)
WBC # BLD: 8.8 E9/L (ref 4.5–11.5)

## 2022-04-29 PROCEDURE — 71046 X-RAY EXAM CHEST 2 VIEWS: CPT

## 2022-04-29 PROCEDURE — 93005 ELECTROCARDIOGRAM TRACING: CPT | Performed by: PHYSICIAN ASSISTANT

## 2022-04-29 PROCEDURE — 80053 COMPREHEN METABOLIC PANEL: CPT

## 2022-04-29 PROCEDURE — 84484 ASSAY OF TROPONIN QUANT: CPT

## 2022-04-29 PROCEDURE — 93010 ELECTROCARDIOGRAM REPORT: CPT | Performed by: INTERNAL MEDICINE

## 2022-04-29 PROCEDURE — 99285 EMERGENCY DEPT VISIT HI MDM: CPT

## 2022-04-29 PROCEDURE — 83880 ASSAY OF NATRIURETIC PEPTIDE: CPT

## 2022-04-29 PROCEDURE — 85025 COMPLETE CBC W/AUTO DIFF WBC: CPT

## 2022-04-29 ASSESSMENT — ENCOUNTER SYMPTOMS
ABDOMINAL PAIN: 0
SHORTNESS OF BREATH: 1

## 2022-04-29 NOTE — RESULT ENCOUNTER NOTE
His blood work looks stable. His watch is not capable of defibrillating his heart if he goes into a life-threatening ventricular arrhythmia.

## 2022-04-29 NOTE — TELEPHONE ENCOUNTER
Contacted patient with lab results and comments per Dr. Seven Vogel. Patient verbalized understanding. He states that he feels like he has been retaining fluid, he gains about a pound or two a day and when he goes to the bathroom, his weight goes back down. He states that he can only walk short distances before getting short of breath. Please advise.    ----- Message from Aneesh Aponte MD sent at 4/29/2022  2:16 PM EDT -----  His blood work looks stable. His watch is not capable of defibrillating his heart if he goes into a life-threatening ventricular arrhythmia.

## 2022-04-29 NOTE — ED PROVIDER NOTES
80-year-old male presenting with concern about shortness of breath and CHF. He states that he is scheduled for some sort of fluid removal on Monday. He is wearing a LifeVest right now which was placed the last time he was admitted to the hospital.  Is awake, alert, oriented x4, no respiratory distress, no hypoxia, no trouble breathing. He is concerned about his BNP number and has a goal of getting it significantly lower than it has been. He states he generally does not feel very well but he had stents placed the last time was admitted to hospital, history of heart attack as well. This is a chronic issue, persistent, moderate severity, no hypoxia at this time, associate with his known CHF history. Family History   Problem Relation Age of Onset    Heart Disease Mother     Prostate Cancer Father      Past Surgical History:   Procedure Laterality Date    BACK SURGERY  8/10/11    DR. Katty Ag AT [facility]. .. FUSION    BRAIN SURGERY      CRANIECTOMY  3/22/10    RIGHT TEMPORAL REGION    CRANIOTOMY  5/10/10    RIGHT FRONTOTEMPORAL REGION   [id number] [facility]    DR. RIVERS       Review of Systems   Constitutional: Negative for chills and fever. Respiratory: Positive for shortness of breath. Cardiovascular: Negative for chest pain. Gastrointestinal: Negative for abdominal pain. All other systems reviewed and are negative. Physical Exam  Constitutional:       General: He is not in acute distress. Appearance: He is well-developed. HENT:      Head: Normocephalic and atraumatic. Eyes:      Pupils: Pupils are equal, round, and reactive to light. Neck:      Thyroid: No thyromegaly. Cardiovascular:      Rate and Rhythm: Normal rate and regular rhythm. Comments: LifeVest in place  Pulmonary:      Effort: Pulmonary effort is normal. No respiratory distress. Breath sounds: Normal breath sounds. No decreased breath sounds or wheezing. Abdominal:      General: There is no distension. Palpations: Abdomen is soft. There is no mass. Tenderness: There is no abdominal tenderness. There is no guarding or rebound. Musculoskeletal:         General: No tenderness. Normal range of motion. Cervical back: Normal range of motion and neck supple. Comments: Trace fluid on bilateral lower extremities   Skin:     General: Skin is warm and dry. Findings: No erythema. Neurological:      Mental Status: He is alert and oriented to person, place, and time. Cranial Nerves: No cranial nerve deficit. Procedures     MDM         EKG: Interpreted by me  Sinus rhythm, rate 69, normal axis, no ST elevations or depressions, some T wave flattening throughout the precordial leads, Q waves throughout the anteroseptal leads. --------------------------------------------- PAST HISTORY ---------------------------------------------  Past Medical History:  has a past medical history of Arthritis, Blood disorder, Bone marrow disorder, Bone spur, CAD in native artery, Cerebral meningioma ([address] Cervical radiculopathy, CHF (congestive heart failure) ([address] Claustrophobia, Coronary artery disease involving native coronary artery, Diabetes mellitus ([address] Disc displacement, lumbar, Enlarged heart, Enlarged prostate, GERD (gastroesophageal reflux disease), Hemoglobin disorder ([address] Hernia, Hyperlipidemia, Hypertension, Low back pain, Osteolytic lesion, Panic anxiety syndrome, S/P angioplasty with stent, and Sleep apnea. Past Surgical History:  has a past surgical history that includes hernia repair (1989); craniectomy (3/22/10); craniotomy (5/10/10); back surgery (8/10/11); and brain surgery. Social History:  reports that he quit smoking about 7 weeks ago. His smoking use included cigarettes. He smoked 1.00 pack per day. He has never used smokeless tobacco. He reports current alcohol use. He reports that he does not use drugs.     Family History: family history includes Heart Disease in his mother; Prostate Cancer in his father. The patients home medications have been reviewed.     Allergies: Adhesive tape    -------------------------------------------------- RESULTS -------------------------------------------------  Labs:  Results for orders placed or performed during the hospital encounter of 04/29/22   CBC with Auto Differential   Result Value Ref Range    WBC 8.8 4.5 - 11.5 E9/L    RBC 3.96 3.80 - 5.80 E12/L    Hemoglobin 13.0 12.5 - 16.5 g/dL    Hematocrit 36.7 (L) 37.0 - 54.0 %    MCV 92.7 80.0 - 99.9 fL    MCH 32.8 26.0 - 35.0 pg    MCHC 35.4 (H) 32.0 - 34.5 %    RDW 11.4 (L) 11.5 - 15.0 fL    Platelets 342 349 - 463 E9/L    MPV 10.7 7.0 - 12.0 fL    Neutrophils % 69.4 43.0 - 80.0 %    Immature Granulocytes % 0.5 0.0 - 5.0 %    Lymphocytes % 23.1 20.0 - 42.0 %    Monocytes % 5.8 2.0 - 12.0 %    Eosinophils % 1.0 0.0 - 6.0 %    Basophils % 0.2 0.0 - 2.0 %    Neutrophils Absolute 6.07 1.80 - 7.30 E9/L    Immature Granulocytes # 0.04 E9/L    Lymphocytes Absolute 2.02 1.50 - 4.00 E9/L    Monocytes Absolute 0.51 0.10 - 0.95 E9/L    Eosinophils Absolute 0.09 0.05 - 0.50 E9/L    Basophils Absolute 0.02 0.00 - 0.20 E9/L   Comprehensive Metabolic Panel w/ Reflex to MG   Result Value Ref Range    Sodium 132 132 - 146 mmol/L    Potassium reflex Magnesium 4.1 3.5 - 5.0 mmol/L    Chloride 95 (L) 98 - 107 mmol/L    CO2 23 22 - 29 mmol/L    Anion Gap 14 7 - 16 mmol/L    Glucose 224 (H) 74 - 99 mg/dL    BUN 17 6 - 23 mg/dL    CREATININE 0.8 0.7 - 1.2 mg/dL    GFR Non-African American >60 >=60 mL/min/1.73    GFR African American >60     Calcium 9.4 8.6 - 10.2 mg/dL    Total Protein 8.3 6.4 - 8.3 g/dL    Albumin 4.5 3.5 - 5.2 g/dL    Total Bilirubin 0.3 0.0 - 1.2 mg/dL    Alkaline Phosphatase 65 40 - 129 U/L    ALT 15 0 - 40 U/L    AST 12 0 - 39 U/L   Troponin   Result Value Ref Range    Troponin, High Sensitivity 16 (H) 0 - 11 ng/L   Brain Natriuretic Peptide   Result Value Ref Range Pro-BNP 1,350 (H) 0 - 125 pg/mL   EKG 12 Lead   Result Value Ref Range    Ventricular Rate 69 BPM    Atrial Rate 69 BPM    P-R Interval 188 ms    QRS Duration 92 ms    Q-T Interval 396 ms    QTc Calculation (Bazett) 424 ms    P Axis -17 degrees    R Axis -2 degrees    T Axis 33 degrees       Radiology:  XR CHEST (2 VW)   Final Result   Borderline cardiomegaly with no acute cardiopulmonary disease.             ------------------------- NURSING NOTES AND VITALS REVIEWED ---------------------------  Date / Time Roomed:  4/29/2022  6:13 PM  ED Bed Assignment:  13/13    The nursing notes within the ED encounter and vital signs as below have been reviewed. BP (!) 152/74   Pulse 68   Temp 97.4 °F (36.3 °C) (Oral)   Resp 16   Ht 5' 8\" (1.727 m)   Wt 227 lb (103 kg)   SpO2 98%   BMI 34.52 kg/m²   Oxygen Saturation Interpretation: Normal      ------------------------------------------ PROGRESS NOTES ------------------------------------------  I have spoken with the patient and discussed todays results, in addition to providing specific details for the plan of care and counseling regarding the diagnosis and prognosis. Their questions are answered at this time and they are agreeable with the plan. I discussed at length with them reasons for immediate return here for re evaluation. They will followup with primary care by calling their office tomorrow. --------------------------------- ADDITIONAL PROVIDER NOTES ---------------------------------  At this time the patient is without objective evidence of an acute process requiring hospitalization or inpatient management. They have remained hemodynamically stable throughout their entire ED visit and are stable for discharge with outpatient follow-up. The plan has been discussed in detail and they are aware of the specific conditions for emergent return, as well as the importance of follow-up.       Discharge Medication List as of 4/29/2022  8:11 PM Diagnosis:  1. Acute on chronic congestive heart failure, unspecified heart failure type ([facility] [address]        Disposition:  Patient's disposition: Discharge to home  Patient's condition is stable.          Sierra Etienne DO  04/30/22 [id number]

## 2022-04-29 NOTE — TELEPHONE ENCOUNTER
Writer contacted Dr. Laura Hernandez to inform of 30 day readmission risk. Dr. Laura Hernandez informed writer there is no decision on disposition at this time.     Call Back: If you need to call back to inform of disposition you can contact me at [phone]

## 2022-04-29 NOTE — ED NOTES
Department of Emergency Medicine  FIRST PROVIDER TRIAGE NOTE             Independent MLP           4/29/22  6:12 PM EDT    Date of Encounter: 4/29/22   MRN: [id number]      HPI: Bertha Malik is a 64 y.o. male who presents to the ED for Shortness of Breath and Congestive Heart Failure (Scheduled Monday @ 0930 for Fluid removal)    Pt reports SOB. Currently on lasix but reports he is \"retaining fluid. \" Cardiology sent patient in for eval.     ROS: Negative for cp, abd pain or back pain. PE: Gen Appearance/Constitutional: alert  Musculoskeletal: moves all extremities x 4     Initial Plan of Care: All treatment areas with department are currently occupied. Plan to order/Initiate the following while awaiting opening in ED: labs, EKG and imaging studies.   Initiate Treatment-Testing, Proceed toTreatment Area When Bed Available for ED Attending/MLP to Continue Care    Electronically signed by Jovan Ravi PA-C   DD: 4/29/22         Jovan Ravi PA-C  04/29/22 1314

## 2022-04-29 NOTE — TELEPHONE ENCOUNTER
Contacted patient with recommendations per Dr. Flo Moritz. He verbalized understanding. Referral forwarded to [facility].

## 2022-04-29 NOTE — TELEPHONE ENCOUNTER
Increase bumetanide to 2 mg in the morning and continue 1 in the afternoon. Referral to CHF clinic. If symptoms become severe or he gets very short of breath to go to the ER. He needs to avoid salty foods.

## 2022-04-29 NOTE — ED NOTES
Pt states that he feels like he is starting to go into chf, he states taht he has had this problem before, he is alert times 3, resp easy at rest, gets short of breath with exertion, lungs are diminished he has fluid in the lower bases of his lungs, he has rales in his bases, abdomen is soft but distended, he has bowel sounds times 4, 0 edema pedal pulses plus bilateral, he does have a life vest on and it is charged      [facility], RN  04/29/22 1100

## 2022-05-02 ENCOUNTER — TELEPHONE (OUTPATIENT)
Dept: OTHER | Facility: CLINIC | Age: 62
End: 2022-05-02

## 2022-05-02 ENCOUNTER — HOSPITAL ENCOUNTER (OUTPATIENT)
Dept: OTHER | Age: 62
Setting detail: THERAPIES SERIES
Discharge: HOME | End: 2022-05-02
Payer: MEDICARE

## 2022-05-02 VITALS
BODY MASS INDEX: 34.21 KG/M2 | WEIGHT: 225 LBS | HEART RATE: 68 BPM | RESPIRATION RATE: 18 BRPM | DIASTOLIC BLOOD PRESSURE: 72 MMHG | SYSTOLIC BLOOD PRESSURE: 128 MMHG | OXYGEN SATURATION: 96 %

## 2022-05-02 LAB
ANION GAP SERPL CALCULATED.3IONS-SCNC: 12 MMOL/L (ref 7–16)
BUN BLDV-MCNC: 16 MG/DL (ref 6–23)
CALCIUM SERPL-MCNC: 9.9 MG/DL (ref 8.6–10.2)
CHLORIDE BLD-SCNC: 96 MMOL/L (ref 98–107)
CO2: 28 MMOL/L (ref 22–29)
CREAT SERPL-MCNC: 0.9 MG/DL (ref 0.7–1.2)
GFR AFRICAN AMERICAN: >60
GFR NON-AFRICAN AMERICAN: >60 ML/MIN/1.73
GLUCOSE BLD-MCNC: 92 MG/DL (ref 74–99)
POTASSIUM SERPL-SCNC: 3.9 MMOL/L (ref 3.5–5)
PRO-BNP: 653 PG/ML (ref 0–125)
SODIUM BLD-SCNC: 136 MMOL/L (ref 132–146)

## 2022-05-02 PROCEDURE — 83880 ASSAY OF NATRIURETIC PEPTIDE: CPT

## 2022-05-02 PROCEDURE — 36415 COLL VENOUS BLD VENIPUNCTURE: CPT

## 2022-05-02 PROCEDURE — 80048 BASIC METABOLIC PNL TOTAL CA: CPT

## 2022-05-02 PROCEDURE — 99204 OFFICE O/P NEW MOD 45 MIN: CPT

## 2022-05-02 PROCEDURE — 96374 THER/PROPH/DIAG INJ IV PUSH: CPT

## 2022-05-02 PROCEDURE — 2500000003 HC RX 250 WO HCPCS

## 2022-05-02 PROCEDURE — 2580000003 HC RX 258

## 2022-05-02 RX ORDER — SODIUM CHLORIDE 0.9 % (FLUSH) 0.9 %
10 SYRINGE (ML) INJECTION ONCE
Status: COMPLETED | OUTPATIENT
Start: 2022-05-02 | End: 2022-05-02

## 2022-05-02 RX ORDER — SODIUM CHLORIDE 0.9 % (FLUSH) 0.9 %
SYRINGE (ML) INJECTION
Status: COMPLETED
Start: 2022-05-02 | End: 2022-05-02

## 2022-05-02 RX ORDER — BUMETANIDE 0.25 MG/ML
INJECTION, SOLUTION INTRAMUSCULAR; INTRAVENOUS
Status: COMPLETED
Start: 2022-05-02 | End: 2022-05-02

## 2022-05-02 RX ORDER — BUMETANIDE 0.25 MG/ML
2 INJECTION, SOLUTION INTRAMUSCULAR; INTRAVENOUS ONCE
Status: COMPLETED | OUTPATIENT
Start: 2022-05-02 | End: 2022-05-02

## 2022-05-02 RX ADMIN — BUMETANIDE 2 MG: 0.25 INJECTION, SOLUTION INTRAMUSCULAR; INTRAVENOUS at 13:24

## 2022-05-02 RX ADMIN — SODIUM CHLORIDE, PRESERVATIVE FREE 10 ML: 5 INJECTION INTRAVENOUS at 13:23

## 2022-05-02 RX ADMIN — Medication 10 ML: at 13:23

## 2022-05-02 NOTE — PROGRESS NOTES
Congestive Heart Failure [id number]   [id number]          Referring Provider:   Primary Care Physician: Dr. Roberto Harrington  Cardiologist: Dr. Calixto Lira  Nephrologist:        History of Present Illness:     Sydeny Stover is a 64 y.o. male with a history of HFrEF, most recent EF 30% from 4/1/2022. Patient Story:    He does  have dyspnea with exertion, shortness of breath, or decline in overall functional capacity. He does not have orthopnea, PND, nocturnal cough or hemoptysis. He does have abdominal distention or bloating, early satiety, anorexia/change in appetite. He does have a good urinary response to  oral diuretic. He does not have lower extremity edema. He denies lightheadedness, dizziness. He denies palpitations, syncope or near syncope. He does not complain of chest pain, pressure, discomfort. Allergies   Allergen Reactions    Adhesive Tape Rash         No outpatient medications have been marked as taking for the 5/2/22 encounter [facility] Encounter) with [facility]OM 1. Prior to Visit Medications    Medication Sig Taking?  Authorizing Provider   busPIRone (BUSPAR) 15 MG tablet   Historical Provider, MD   apixaban (ELIQUIS) 5 MG TABS tablet Take 1 tablet by mouth 2 times daily  Jillian Killian MD   empagliflozin (JARDIANCE) 10 MG tablet Take 1 tablet by mouth daily  Patient not taking: Reported on 5/2/2022  Jillian Killian MD   sacubitril-valsartan (ENTRESTO) 24-26 MG per tablet Take 1 tablet by mouth 2 times daily  Shaniqua Zimmer MD   bumetanide (BUMEX) 1 MG tablet Take 1 tablet by mouth 2 times daily  Shaniqua Zimmer MD   spironolactone (ALDACTONE) 25 MG tablet Take 1 tablet by mouth daily  Shaniqua Zimmer MD   clopidogrel (PLAVIX) 75 MG tablet Take 75 mg by mouth daily  Historical Provider, MD   metFORMIN (GLUCOPHAGE-XR) 500 MG extended release tablet Take 2 tablets by mouth daily (with breakfast)  Gali Morrow DO   rosuvastatin (CRESTOR) 40 MG tablet Take 1 tablet by mouth nightly  Shant Dodge DO   famotidine (PEPCID) 40 MG tablet Take 40 mg by mouth daily  Historical Provider, MD   metoprolol succinate (TOPROL XL) 50 MG extended release tablet Take 1 tablet by mouth daily  Patient taking differently: Take 100 mg by mouth daily   Retia Monday, MD   tiZANidine (ZANAFLEX) 4 MG tablet Take 4 mg by mouth every 6 hours as needed  Historical Provider, MD   zolpidem (AMBIEN) 10 MG tablet Take by mouth nightly as needed for Sleep. Historical Provider, MD   Probiotic Product (SOLUBLE FIBER/PROBIOTICS PO) Take by mouth  Historical Provider, MD           Guideline directed medical:  ARNI/ACE I/ARB: Yes  Beta blocker:   Yes  Aldosterone antagonist:  No        Physical Examination:     /72   Pulse 68   Resp 18   Wt 225 lb (102.1 kg)   SpO2 96%   BMI 34.21 kg/m²     Assessment  Charting Type: Admission    Neurological  Level of Consciousness: Alert (0)  Orientation Level: Oriented X4              Respiratory  Respiratory Pattern: Regular  Respiratory Depth: Normal  Respiratory Quality/Effort: Unlabored  Chest Assessment: Chest expansion asymmetrical  L Breath Sounds: Clear  R Breath Sounds: Clear              Cardiac  Cardiac Regularity: Regular  Cardiac Rhythm: Sinus rhythm    Rhythm Interpretation  Pulse: 68                         Peripheral Vascular  Peripheral Vascular (WDL): Within Defined Limits                        Psychosocial  Psychosocial (WDL): Within Defined Limits                        Pulse: 68                     LAB DATA:    Last 3 BMP      Sodium (mmol/L)   Date Value   04/29/2022 132   04/19/2022 133   04/02/2022 138     Potassium (mmol/L)   Date Value   04/19/2022 4.1   04/02/2022 4.3   04/01/2022 3.8     Potassium reflex Magnesium (mmol/L)   Date Value   04/29/2022 4.1   04/02/2022 4.3     Chloride (mmol/L)   Date Value   04/29/2022 95 (L)   04/19/2022 96 (L)   04/02/2022 99     CO2 (mmol/L)   Date Value 04/29/2022 23   04/19/2022 28   04/02/2022 27     BUN (mg/dL)   Date Value   04/29/2022 17   04/19/2022 16   04/02/2022 10     Glucose (mg/dL)   Date Value   04/29/2022 224 (H)   04/19/2022 226 (H)   04/02/2022 140 (H)   04/21/2011 78   04/17/2011 98   01/05/2011 129 (H)     Calcium (mg/dL)   Date Value   04/29/2022 9.4   04/19/2022 9.6   04/02/2022 9.5       Last 3 BNP       Pro-BNP (pg/mL)   Date Value   04/29/2022 1,350 (H)   04/19/2022 850 (H)   04/02/2022 760 (H)          CBC:   Recent Labs     04/29/22  1843   WBC 8.8   HGB 13.0        BMP:    Recent Labs     04/29/22  1843      K 4.1   CL 95*   CO2 23   BUN 17   CREATININE 0.8   GLUCOSE 224*     Hepatic:   Recent Labs     04/29/22  1843   AST 12   ALT 15   BILITOT 0.3   ALKPHOS 65     Troponin: No results for input(s): TROPONINI in the last 72 hours. BNP: No results for input(s): BNP in the last 72 hours. Lipids: No results for input(s): CHOL, HDL in the last 72 hours. Invalid input(s): LDLCALCU  INR: No results for input(s): INR in the last 72 hours. WEIGHTS:    Wt Readings from Last 3 Encounters:   05/02/22 225 lb (102.1 kg)   04/29/22 227 lb (103 kg)   04/12/22 238 lb (108 kg)         TELEMETRY:  Cardiac Regularity: Regular  Cardiac Rhythm/Interpretation: NSR        ASSESSMENT:  Torrey Orellana is hypervolemic with weight of 235 lbs. First visit with CHF clinic today. {atient presented alone to today's appt. Discussed with patient the purpose of CHF clinic and importance of daily weights and doing a self check every day to monitor for changes. Went over the three heart failure zones and to call cardiologist if in yellow zone immediately to prevent any further decline. Patient has a working scale. Patient is agreeable to future CHF clinic visits. Next 3 consecutive weekly appointments made today so that patient has a total of 4 visits within first 30 days.     Patient states he is not taking his Jardiance due to the high cost.      Interventions completed this visit:  IV diuretics given yes, Bumex 2mg IVP  Lab work obtained yes, BMP, BNP   Reviewed currently prescribed medications with patient, educated on importance of compliance and answered any questions regarding their medication  Educated on signs and symptoms of HF  Educated on low sodium diet    PLAN:  Scheduled to follow up in CHF clinic on   Future Appointments   Date Time Provider Tina Packer   5/10/2022  8:30 AM [facility] ROOM 1 [facility]   5/16/2022  8:30 AM [facility] ROOM 1 [facility]   5/17/2022  8:45 AM Yesenia Lomeli MD [id number]   5/23/2022  9:00 AM [facility] ROOM 1 [facility] [address]     Given clinic phone number and aware of signs and symptoms to call with any HF change in symptoms.

## 2022-05-02 NOTE — TELEPHONE ENCOUNTER
[facility] contacted pt regarding ED follow up appt. Spoke with pt, he stated he does not need assistance with scheduling follow up appt.

## 2022-05-10 ENCOUNTER — TELEPHONE (OUTPATIENT)
Dept: CARDIOLOGY CLINIC | Age: 62
End: 2022-05-10

## 2022-05-10 ENCOUNTER — HOSPITAL ENCOUNTER (OUTPATIENT)
Dept: OTHER | Age: 62
Setting detail: THERAPIES SERIES
Discharge: HOME | End: 2022-05-10
Payer: MEDICARE

## 2022-05-10 VITALS
WEIGHT: 223 LBS | SYSTOLIC BLOOD PRESSURE: 142 MMHG | OXYGEN SATURATION: 96 % | BODY MASS INDEX: 33.91 KG/M2 | HEART RATE: 76 BPM | DIASTOLIC BLOOD PRESSURE: 69 MMHG | RESPIRATION RATE: 16 BRPM

## 2022-05-10 LAB
ANION GAP SERPL CALCULATED.3IONS-SCNC: 10 MMOL/L (ref 7–16)
BUN BLDV-MCNC: 17 MG/DL (ref 6–23)
CALCIUM SERPL-MCNC: 9.7 MG/DL (ref 8.6–10.2)
CHLORIDE BLD-SCNC: 95 MMOL/L (ref 98–107)
CO2: 24 MMOL/L (ref 22–29)
CREAT SERPL-MCNC: 0.9 MG/DL (ref 0.7–1.2)
GFR AFRICAN AMERICAN: >60
GFR NON-AFRICAN AMERICAN: >60 ML/MIN/1.73
GLUCOSE BLD-MCNC: 178 MG/DL (ref 74–99)
POTASSIUM SERPL-SCNC: 4.2 MMOL/L (ref 3.5–5)
PRO-BNP: 524 PG/ML (ref 0–125)
SODIUM BLD-SCNC: 129 MMOL/L (ref 132–146)

## 2022-05-10 PROCEDURE — 80048 BASIC METABOLIC PNL TOTAL CA: CPT

## 2022-05-10 PROCEDURE — 99214 OFFICE O/P EST MOD 30 MIN: CPT

## 2022-05-10 PROCEDURE — 36415 COLL VENOUS BLD VENIPUNCTURE: CPT

## 2022-05-10 PROCEDURE — 83880 ASSAY OF NATRIURETIC PEPTIDE: CPT

## 2022-05-10 RX ORDER — METOPROLOL SUCCINATE 100 MG/1
100 TABLET, EXTENDED RELEASE ORAL DAILY
COMMUNITY
End: 2022-08-09 | Stop reason: ALTCHOICE

## 2022-05-10 ASSESSMENT — PAIN SCALES - GENERAL: PAINLEVEL_OUTOF10: 0

## 2022-05-10 NOTE — TELEPHONE ENCOUNTER
Contacted patient with information per Dr. Joshua Cooper. Patient states that he is going to continue his medications as prescribed until he sees Dr. Joshua Cooper in the office on 5/17/2022.

## 2022-05-10 NOTE — PROGRESS NOTES
Congestive Heart Failure [id number]   [id number]          Referring Provider:   Primary Care Physician: Dr. Ana M Foote  Cardiologist: Dr. Cher Valdez  Nephrologist:        History of Present Illness:     Vivian Souza is a 64 y.o. male with a history of HFrEF, most recent EF 30% from 4/1/2022. Patient Story:    He does  have dyspnea with exertion, shortness of breath, or decline in overall functional capacity. He does not have orthopnea, PND, nocturnal cough or hemoptysis. He does have abdominal distention or bloating, early satiety, anorexia/change in appetite. He does have a good urinary response to  oral diuretic. He does not have lower extremity edema. He denies lightheadedness, dizziness. He denies palpitations, syncope or near syncope. He does not complain of chest pain, pressure, discomfort. Allergies   Allergen Reactions    Adhesive Tape Rash         No outpatient medications have been marked as taking for the 5/10/22 encounter [facility] Encounter) with [facility] ROOM 1. Prior to Visit Medications    Medication Sig Taking?  Authorizing Provider   busPIRone (BUSPAR) 15 MG tablet 7.5 mg 2 times daily   Historical Provider, MD   apixaban (ELIQUIS) 5 MG TABS tablet Take 1 tablet by mouth 2 times daily  Enid Rodriguez MD   empagliflozin (JARDIANCE) 10 MG tablet Take 1 tablet by mouth daily  Patient not taking: Reported on 5/2/2022  Enid Rodriguez MD   sacubitril-valsartan (ENTRESTO) 24-26 MG per tablet Take 1 tablet by mouth 2 times daily  Benoit Browne MD   bumetanide (BUMEX) 1 MG tablet Take 1 tablet by mouth 2 times daily  Benoit Browne MD   spironolactone (ALDACTONE) 25 MG tablet Take 1 tablet by mouth daily  Benoit Browne MD   clopidogrel (PLAVIX) 75 MG tablet Take 75 mg by mouth daily  Historical Provider, MD   metFORMIN (GLUCOPHAGE-XR) 500 MG extended release tablet Take 2 tablets by mouth daily (with breakfast)  Gabe Morales DO rosuvastatin (CRESTOR) 40 MG tablet Take 1 tablet by mouth nightly  Marisel Dodge DO   famotidine (PEPCID) 40 MG tablet Take 40 mg by mouth daily  Historical Provider, MD   metoprolol succinate (TOPROL XL) 50 MG extended release tablet Take 1 tablet by mouth daily  Patient taking differently: Take 100 mg by mouth daily   Yesenia Lomeli MD   tiZANidine (ZANAFLEX) 4 MG tablet Take 4 mg by mouth every 6 hours as needed  Historical Provider, MD   zolpidem (AMBIEN) 10 MG tablet Take by mouth nightly as needed for Sleep. Historical Provider, MD   Probiotic Product (SOLUBLE FIBER/PROBIOTICS PO) Take by mouth  Historical Provider, MD           Guideline directed medical:  ARNI/ACE I/ARB: Yes  Beta blocker:   Yes  Aldosterone antagonist:  No        Physical Examination:     BP (!) 142/69   Pulse 76   Resp 16   Wt 223 lb (101.2 kg)   SpO2 96%   BMI 33.91 kg/m²     Assessment  Charting Type: Shift assessment (chf clinic)    Neurological  Level of Consciousness: Alert (0)              Respiratory  Respiratory Quality/Effort: Unlabored  Chest Assessment: Chest expansion symmetrical              Cardiac  Cardiac Rhythm: Sinus rhythm    Rhythm Interpretation  Pulse: 76         Gastrointestinal  Abdominal (WDL): Within Defined Limits               Peripheral Vascular  Peripheral Vascular (WDL): Within Defined Limits                   Genitourinary  Genitourinary (WDL): Within Defined Limits         Pain Assessment  Pain Assessment: 0-10  Pain Level: 0                   Pulse: 76                     LAB DATA:    Last 3 BMP      Sodium (mmol/L)   Date Value   05/02/2022 136   04/29/2022 132   04/19/2022 133     Potassium (mmol/L)   Date Value   05/02/2022 3.9   04/19/2022 4.1   04/02/2022 4.3     Potassium reflex Magnesium (mmol/L)   Date Value   04/29/2022 4.1   04/02/2022 4.3     Chloride (mmol/L)   Date Value   05/02/2022 96 (L)   04/29/2022 95 (L)   04/19/2022 96 (L)     CO2 (mmol/L)   Date Value   05/02/2022 28 04/29/2022 23   04/19/2022 28     BUN (mg/dL)   Date Value   05/02/2022 16   04/29/2022 17   04/19/2022 16     Glucose (mg/dL)   Date Value   05/02/2022 92   04/29/2022 224 (H)   04/19/2022 226 (H)   04/21/2011 78   04/17/2011 98   01/05/2011 129 (H)     Calcium (mg/dL)   Date Value   05/02/2022 9.9   04/29/2022 9.4   04/19/2022 9.6       Last 3 BNP       Pro-BNP (pg/mL)   Date Value   05/02/2022 653 (H)   04/29/2022 1,350 (H)   04/19/2022 850 (H)          CBC:   No results for input(s): WBC, HGB, PLT in the last 72 hours. BMP:    No results for input(s): NA, K, CL, CO2, BUN, CREATININE, GLUCOSE in the last 72 hours. Hepatic:   No results for input(s): AST, ALT, ALB, BILITOT, ALKPHOS in the last 72 hours. Troponin: No results for input(s): TROPONINI in the last 72 hours. BNP: No results for input(s): BNP in the last 72 hours. Lipids: No results for input(s): CHOL, HDL in the last 72 hours. Invalid input(s): LDLCALCU  INR: No results for input(s): INR in the last 72 hours. WEIGHTS:    Wt Readings from Last 3 Encounters:   05/10/22 223 lb (101.2 kg)   05/02/22 225 lb (102.1 kg)   04/29/22 227 lb (103 kg)         TELEMETRY:  Cardiac Regularity: Regular  Cardiac Rhythm/Interpretation: NSR        ASSESSMENT:  Amaneidagmar Shutter weight is down 2lbs since last CHF Clinic visit. Patient has been following 2gm sodium diet and has been hydrating with 64fld ounces of water. Patient states he has good urinary output from oral diuretic. Patient states he feels good and he sees Dr Delisa Lucas next week. Patient educated on 2gm sodium diet and handout given        Patient states he is not taking his Jardiance due to the high cost.      Interventions completed this visit:  IV diuretics given no  Lab work obtained yes, BMP, BNP   Reviewed currently prescribed medications with patient, educated on importance of compliance and answered any questions regarding their medication  Educated on signs and symptoms of HF  Educated on low sodium diet    PLAN:  Scheduled to follow up in CHF clinic on   Future Appointments   Date Time Provider Tina Packer   5/17/2022  8:45 AM Peter Barrera MD [id number]   5/23/2022  9:00 AM [facility]   6/1/2022  8:30 AM [facility] ROOM 1 [facility] PATTI     Given clinic phone number and aware of signs and symptoms to call with any HF change in symptoms.

## 2022-05-10 NOTE — TELEPHONE ENCOUNTER
I don't have enough information to advise. Low sodium can be from a lot of different causes. I don't know if he is volume overloaded or not. Without diuretics he will be at risk of fluid accumulation and should watch closely for this.

## 2022-05-10 NOTE — PROGRESS NOTES
I spoke with Kari at Dr Alireza Patel office to please notify Dr Jen Pérez that NA was 129 off of BMP panel and she will notify Dr Jen Pérez

## 2022-05-10 NOTE — TELEPHONE ENCOUNTER
Patient called stating his PCP received his lab results and his sodium is low. PCP wants him to stop taking Bumex and spironolactone. Please review and advise.

## 2022-05-14 NOTE — TELEPHONE ENCOUNTER
Nuclear stress scheduled.   Electronically signed by Wray Homans on 12/7/2021 at 12:53 PM Denies headaches, visual changes, RUQ pain, N/V. Denies chest pain/fever/SOB/palpitations.

## 2022-05-17 ENCOUNTER — OFFICE VISIT (OUTPATIENT)
Dept: CARDIOLOGY CLINIC | Age: 62
End: 2022-05-17
Payer: MEDICARE

## 2022-05-17 VITALS
HEART RATE: 76 BPM | RESPIRATION RATE: 14 BRPM | BODY MASS INDEX: 35.77 KG/M2 | WEIGHT: 236 LBS | SYSTOLIC BLOOD PRESSURE: 124 MMHG | HEIGHT: 68 IN | DIASTOLIC BLOOD PRESSURE: 70 MMHG

## 2022-05-17 DIAGNOSIS — I25.10 CAD IN NATIVE ARTERY: Primary | ICD-10-CM

## 2022-05-17 DIAGNOSIS — I25.10 CAD IN NATIVE ARTERY: ICD-10-CM

## 2022-05-17 DIAGNOSIS — I42.9 CARDIOMYOPATHY, UNSPECIFIED TYPE (HCC): ICD-10-CM

## 2022-05-17 LAB
ANION GAP SERPL CALCULATED.3IONS-SCNC: 10 MMOL/L (ref 7–16)
BUN BLDV-MCNC: 13 MG/DL (ref 6–23)
CALCIUM SERPL-MCNC: 9.6 MG/DL (ref 8.6–10.2)
CHLORIDE BLD-SCNC: 95 MMOL/L (ref 98–107)
CO2: 26 MMOL/L (ref 22–29)
CREAT SERPL-MCNC: 0.9 MG/DL (ref 0.7–1.2)
GFR AFRICAN AMERICAN: >60
GFR NON-AFRICAN AMERICAN: >60 ML/MIN/1.73
GLUCOSE BLD-MCNC: 183 MG/DL (ref 74–99)
POTASSIUM SERPL-SCNC: 4 MMOL/L (ref 3.5–5)
PRO-BNP: 504 PG/ML (ref 0–125)
SODIUM BLD-SCNC: 131 MMOL/L (ref 132–146)

## 2022-05-17 PROCEDURE — 36415 COLL VENOUS BLD VENIPUNCTURE: CPT | Performed by: INTERNAL MEDICINE

## 2022-05-17 PROCEDURE — 99215 OFFICE O/P EST HI 40 MIN: CPT | Performed by: INTERNAL MEDICINE

## 2022-05-17 PROCEDURE — 93000 ELECTROCARDIOGRAM COMPLETE: CPT | Performed by: INTERNAL MEDICINE

## 2022-05-17 NOTE — PROGRESS NOTES
OUTPATIENT CARDIOLOGY FOLLOW-UP    Name: Regina Momin    Age: 64 y.o. Primary Care Physician: Ector Marques MD    Date of Service: 5/17/2022    Chief Complaint:   Chief Complaint   Patient presents with    1 Month Follow-Up    Shortness of Breath        Interim History:   Here for follow-up of cardiomyopathy. Seen by me as new patient 3/10/2022 office visit for abnormal stress test showing fixed inferior defect with EF in the 20s, and echo showing an EF in the 35s. Heart catheterization 3/16/2022 subsequently revealed multivessel coronary disease and ended up getting stents to the mid LAD, OM 2, and RCA. He was admitted again 3/31/2022 for chest pain and shortness of breath and was treated for decompensated heart failure in the setting of accelerated hypertension and acute pulmonary edema. He was diuresed, GDMT was optimized and was recommended wearable cardioverter defibrillator upon discharge. He reports ongoing anxiety. He also finds the vest very uncomfortable but continues to wear it. He has shortness of breath with exertion. He thinks it could be his allergies or that the vest is constricting his chest.. He denies chest pain, shortness of breath, device shocks, palpitations, lower extremity edema. He has low sodium of 129 on recent outpatient blood work, and his diuretics were decreased by Dr. Janet Kahn. The patient states he then was trying to increase his sodium intake to help, I explained that is not the treatment for low sodium. Last visit he was found to be in atrial fibrillation. He was brought back the following for an EKG was back in sinus. Has been checking his rhythm with a cardia device at home and states his rhythm has been normal.  States he has episodes of low blood sugar. States his blood pressure is also low at night.     Review of Systems:   Negative except as described above    Past Medical History:  Past Medical History:   Diagnosis Date    Arthritis     ASK WHERE    Blood disorder     Bone marrow disorder     Bone spur     IN CERVICAL REGION    CAD in native artery 2022    Cerebral meningioma (HCC)     Cervical radiculopathy     CHF (congestive heart failure) (HCC)     Claustrophobia     Coronary artery disease involving native coronary artery     Diabetes mellitus (HCC)     Disc displacement, lumbar     Enlarged heart     Enlarged prostate     GERD (gastroesophageal reflux disease)     Hemoglobin disorder ([facility] [address] 2012    HIGH. .. HAS F/U ON 12 WITH DR. JIMENEZ    Hernia     Hyperlipidemia     Hypertension     Low back pain     Osteolytic lesion     Panic anxiety syndrome     S/P angioplasty with stent 2022    LAD, OM, RCA    Sleep apnea     awaiting [facility] back ordered       Past Surgical History:  Past Surgical History:   Procedure Laterality Date    BACK SURGERY  8/10/11    DR. Ramo Resendez AT [facility]. .. FUSION    BRAIN SURGERY      CRANIECTOMY  3/22/10    RIGHT TEMPORAL REGION    CRANIOTOMY  5/10/10    RIGHT FRONTOTEMPORAL REGION   [id number]    DR. RIVERS       Family History:  Family History   Problem Relation Age of Onset    Heart Disease Mother     Prostate Cancer Father        Social History:  Social History     Tobacco Use    Smoking status: Former Smoker     Packs/day: 1.00     Types: Cigarettes     Quit date: 3/10/2022     Years since quittin.1    Smokeless tobacco: Never Used   Vaping Use    Vaping Use: Never used   Substance Use Topics    Alcohol use: Yes     Comment: DRINKS BEER WEEKLY; UNKNOWN AMOUNT    Drug use: No        Allergies:   Allergies   Allergen Reactions    Adhesive Tape Rash       Current Medications:    Current Outpatient Medications:     metoprolol succinate (TOPROL XL) 100 MG extended release tablet, Take 100 mg by mouth daily, Disp: , Rfl:     busPIRone (BUSPAR) 15 MG tablet, 7.5 mg 2 times daily , Disp: , Rfl:     apixaban (ELIQUIS) 5 MG TABS tablet, Take 1 tablet by mouth 2 times daily, Disp: 60 tablet, Rfl: 0    sacubitril-valsartan (ENTRESTO) 24-26 MG per tablet, Take 1 tablet by mouth 2 times daily, Disp: 60 tablet, Rfl: 3    bumetanide (BUMEX) 1 MG tablet, Take 1 tablet by mouth 2 times daily, Disp: 30 tablet, Rfl: 3    spironolactone (ALDACTONE) 25 MG tablet, Take 1 tablet by mouth daily, Disp: 30 tablet, Rfl: 3    clopidogrel (PLAVIX) 75 MG tablet, Take 75 mg by mouth daily, Disp: , Rfl:     metFORMIN (GLUCOPHAGE-XR) 500 MG extended release tablet, Take 2 tablets by mouth daily (with breakfast), Disp: 30 tablet, Rfl: 3    rosuvastatin (CRESTOR) 40 MG tablet, Take 1 tablet by mouth nightly, Disp: 30 tablet, Rfl: 0    famotidine (PEPCID) 40 MG tablet, Take 40 mg by mouth daily, Disp: , Rfl:     tiZANidine (ZANAFLEX) 4 MG tablet, Take 4 mg by mouth every 6 hours as needed, Disp: , Rfl:     zolpidem (AMBIEN) 10 MG tablet, Take by mouth nightly as needed for Sleep., Disp: , Rfl:     Probiotic Product (SOLUBLE FIBER/PROBIOTICS PO), Take by mouth (Patient not taking: Reported on 5/17/2022), Disp: , Rfl:     Physical Exam:  /70   Pulse 76   Resp 14   Ht 5' 8\" (1.727 m)   Wt 236 lb (107 kg)   BMI 35.88 kg/m²   Wt Readings from Last 3 Encounters:   05/17/22 236 lb (107 kg)   05/10/22 223 lb (101.2 kg)   05/02/22 225 lb (102.1 kg)     Appearance: Awake, alert and oriented x 3, no acute respiratory distress  Skin: Intact, no rash  Head: Normocephalic, atraumatic  Eyes: EOMI, no conjunctival erythema  ENMT: No pharyngeal erythema, MMM, no rhinorrhea  Neck: Supple, no elevated JVP, no carotid bruits  Lungs: Clear to auscultation bilaterally. No wheezes, rales, or rhonchi. Cardiac: Irregular rhythm with a normal rate, +S1S2, no murmurs apparent.   Wearable cardioverter defibrillator in place  Abdomen: Soft, nontender, +bowel sounds  Extremities: Moves all extremities x 4, no lower extremity edema  Neurologic: No focal motor deficits apparent, normal mood and affect, alert and oriented x 3  Peripheral Pulses: Intact posterior tibial pulses bilaterally    Laboratory Tests:  Lab Results   Component Value Date    CREATININE 0.9 05/10/2022    BUN 17 05/10/2022     (L) 05/10/2022    K 4.2 05/10/2022    CL 95 (L) 05/10/2022    CO2 24 05/10/2022     Lab Results   Component Value Date    MG 1.9 2022     Lab Results   Component Value Date    WBC 8.8 2022    HGB 13.0 2022    HCT 36.7 (L) 2022    MCV 92.7 2022     2022     Lab Results   Component Value Date    ALT 15 2022    AST 12 2022    ALKPHOS 65 2022    BILITOT 0.3 2022     Lab Results   Component Value Date    CKTOTAL 73 2010    CKMB 1.1 2010    TROPONINI <0.01 2015    TROPONINI 0.02 2010    TROPONINI 0.01 2010     Lab Results   Component Value Date    INR 1.1 03/15/2022    INR 1.0 2011    INR 1.0 2010    PROTIME 11.7 03/15/2022    PROTIME 11.6 2011    PROTIME 11.4 2010     Lab Results   Component Value Date    TSH 4.710 (H) 2022     Lab Results   Component Value Date    LABA1C 6.9 (H) 2022     No results found for: EAG  Lab Results   Component Value Date    CHOL 213 (H) 2010     Lab Results   Component Value Date    TRIG 191 (H) 2010     Lab Results   Component Value Date    HDL 40.0 (A) 2010     Lab Results   Component Value Date    LDLCALC 135 (H) 2010     No results found for: LABVLDL, VLDL  No results found for: CHOLHDLRATIO  No results for input(s): PROBNP in the last 72 hours. Cardiac Tests:  EC2022: Atrial fibrillation 80 bpm.  Normal axis and intervals. Prior anterior infarct. Nonspecific T wave changes    2022: Sinus rhythm 76 beats minute. Normal axis and intervals. Nonspecific T wave changes. Echocardiogram:   TTE 22 [place]     Summary   difficult visualization. Contrast used. moderate global hypokinesis. Inferior akinesis.  EF 30% Severely reduced left ventricular systolic function. Stage I diastolic dysfunction. Trace to mild mitral regurgitation. Limited TTE 4/1/2022 [place]  Dilated LV, global hypokinesis with posterior akinesis  EF 30%  Mild MR    Stress test:    Pharmacologic stress 12/13/2021  Gated SPECT left ventricular ejection fraction was calculated to be 25%, with inferior hypokinesis.     Impression:    1. ECG during the infusion did not change. 2. The myocardial perfusion imaging was abnormal.    The abnormality was a a large sized fixed defect in the inferior wall suggestive of a prior MI     3. Overall left ventricular systolic function was abnormal with regional wall motion abnormalities. 4. Intermediate risk general pharmacologic stress test.      Cardiac catheterization:   LHC/PCI 3/16/22 [place]    IFR of the ostial LAD: 0.95, 0.95, 0.94.     Angiographic Results/findings:  Left Main: No angiographically significant stenosis. LAD: Ostial diffuse eccentric 50% stenosis. Mid long diffuse 80% stenosis. D1: Mild luminal irregularities. D2: No angiographically significant stenosis. Blanchie Bevels Cx: Codominant vessel. No angiographically significant stenosis. .  OM1: Large vessel. Mid mild luminal irregularities. There is a small lateral branch that is a 1.8 to 2 mm vessel. This has an ostial 99% subtotal occlusion. Not amenable to PCI. Blanchie Bevels OM2: 2.0 mm vessel. Mid diffuse 90% stenosis. OM 3: Tiny vessel. OM 4: No angiographically significant stenosis. OM 5: (PDA) no angiographically significant stenosis. .  Ramus: Absent. RCA: Mid diffuse eccentric 80% stenosis. .  PDA: Off of the circumflex as above. .  PLB: No angiographically significant stenosis. #Percutaneous coronary artery intervention of the mid LAD with a 2.5 x 22 mm drug-eluting stent. #Percutaneous coronary artery intervention of the OM2 with a 2.0 x 12 mm drug-eluting stent.   #Percutaneous coronary intervention of the mid RCA with a 3.25 x 15 mm drug-eluting stent. #iFR of the ostial LAD. Orders Placed This Encounter   Procedures    Basic Metabolic Panel    Brain Natriuretic Peptide    EKG 12 lead    ECHO COMPLETE        Requested Prescriptions      No prescriptions requested or ordered in this encounter        ASSESSMENT / PLAN:  1. Multivessel CAD status post multivessel PCI to mLAD, OM2, mRCA as above  2. Ischemic cardiomyopathy. EF 25% by SPECT, 30% by echo. 3. Chronic HFrEF. He is euvolemic. NYHA class II/ACC stage C  4. New onset atrial fibrillation. Initially noted 4/12/2022 office visit. [id number] at least 4. Spontaneously converted to sinus  5. Hypertension, well controlled  6. Type 2 diabetes, on Metformin. A1c 6.9%  7. Hyperlipidemia, on ezetimibe. Statin intolerant  8. NISH, untreated, awaiting CPAP  9. History of cerebral meningioma status post surgical resection  10. GERD  11. Chronic neck pain, back pain  12. Obesity  13. Tobacco abuse  14. Severe anxiety    Recommendations:  Appears euvolemic. He is still somewhat symptomatic. He is maintaining sinus rhythm.     · Check proBNP and BNP today  · Continue bumetanide 1 mg twice daily for now  · Continue GDMT for cardiomyopathy, no room to uptitrate given low blood pressure  · Metoprolol succinate 100 mg daily  · Sacubitril/valsartan 24-26 mg twice daily  · Spironolactone 25 mg daily  · Recommend SGLT2 inhibitor -patient declined  · Continue wearable cardioverter defibrillator for now  · Plan to reassess EF after at least 3 months of optimized GDMT, and if EF remains less than 35% will refer for ICD -check echo 6/2022  · Continue apixaban plus clopidogrel  · Continue statin  · Recommend cardiac rehab, states they told him not to come since he goes to the CHF clinic; I told him he can go when he feels up to it  · Aggressive risk factor modification, including smoking cessation recommended  · Follow-up in 3 months or sooner if need arises    Greater than 40-minutes spent on this encounter    The patient's current medication list, allergies, problem list and results of all previously ordered testing were reviewed at today's visit.     Myke Correa MD, [facility]

## 2022-05-23 ENCOUNTER — HOSPITAL ENCOUNTER (OUTPATIENT)
Dept: OTHER | Age: 62
Setting detail: THERAPIES SERIES
Discharge: HOME | End: 2022-05-23
Payer: MEDICARE

## 2022-05-23 VITALS
HEART RATE: 70 BPM | WEIGHT: 235 LBS | RESPIRATION RATE: 16 BRPM | OXYGEN SATURATION: 97 % | SYSTOLIC BLOOD PRESSURE: 134 MMHG | DIASTOLIC BLOOD PRESSURE: 62 MMHG | BODY MASS INDEX: 35.73 KG/M2

## 2022-05-23 LAB
ANION GAP SERPL CALCULATED.3IONS-SCNC: 9 MMOL/L (ref 7–16)
BUN BLDV-MCNC: 15 MG/DL (ref 6–23)
CALCIUM SERPL-MCNC: 9.5 MG/DL (ref 8.6–10.2)
CHLORIDE BLD-SCNC: 97 MMOL/L (ref 98–107)
CO2: 28 MMOL/L (ref 22–29)
CREAT SERPL-MCNC: 0.9 MG/DL (ref 0.7–1.2)
GFR AFRICAN AMERICAN: >60
GFR NON-AFRICAN AMERICAN: >60 ML/MIN/1.73
GLUCOSE BLD-MCNC: 195 MG/DL (ref 74–99)
POTASSIUM SERPL-SCNC: 4.2 MMOL/L (ref 3.5–5)
PRO-BNP: 401 PG/ML (ref 0–125)
SODIUM BLD-SCNC: 134 MMOL/L (ref 132–146)

## 2022-05-23 PROCEDURE — 36415 COLL VENOUS BLD VENIPUNCTURE: CPT

## 2022-05-23 PROCEDURE — 80048 BASIC METABOLIC PNL TOTAL CA: CPT

## 2022-05-23 PROCEDURE — 99214 OFFICE O/P EST MOD 30 MIN: CPT

## 2022-05-23 PROCEDURE — 83880 ASSAY OF NATRIURETIC PEPTIDE: CPT

## 2022-05-23 NOTE — PROGRESS NOTES
Congestive Heart Failure [place] [address] [facility]        Ray Baltazar   1960          Referring Provider:   Primary Care Physician: Dr. Jalyn Horton  Cardiologist: Dr. Arelis Lantigua  Nephrologist:        History of Present Illness:     Ray Baltazar is a 64 y.o. male with a history of HFrEF, most recent EF 30% from 4/1/2022. Patient Story:    He does  have dyspnea with exertion, shortness of breath, or decline in overall functional capacity. He does not have orthopnea, PND, nocturnal cough or hemoptysis. He does not have abdominal distention or bloating, early satiety, anorexia/change in appetite. He does have a good urinary response to  oral diuretic. He does not have lower extremity edema. He denies lightheadedness, dizziness. He denies palpitations, syncope or near syncope. He does not complain of chest pain, pressure, discomfort. Allergies   Allergen Reactions    Adhesive Tape Rash         No outpatient medications have been marked as taking for the 5/23/22 encounter [facility] Encounter) with [facility] ROOM 1. Prior to Visit Medications    Medication Sig Taking?  Authorizing Provider   metoprolol succinate (TOPROL XL) 100 MG extended release tablet Take 100 mg by mouth daily  Historical Provider, MD   busPIRone (BUSPAR) 15 MG tablet 7.5 mg 2 times daily   Historical Provider, MD   apixaban (ELIQUIS) 5 MG TABS tablet Take 1 tablet by mouth 2 times daily  Lurdes Pittman MD   sacubitril-valsartan (ENTRESTO) 24-26 MG per tablet Take 1 tablet by mouth 2 times daily  Carmela Mejia MD   bumetanide (BUMEX) 1 MG tablet Take 1 tablet by mouth 2 times daily  Carmela Mejia MD   spironolactone (ALDACTONE) 25 MG tablet Take 1 tablet by mouth daily  Carmela Mejia MD   clopidogrel (PLAVIX) 75 MG tablet Take 75 mg by mouth daily  Historical Provider, MD   metFORMIN (GLUCOPHAGE-XR) 500 MG extended release tablet Take 2 tablets by mouth daily (with breakfast)  Tiffanie Hampton DO Echo   rosuvastatin (CRESTOR) 40 MG tablet Take 1 tablet by mouth nightly  Leanne Distance DO Echo   famotidine (PEPCID) 40 MG tablet Take 40 mg by mouth daily  Historical Provider, MD   tiZANidine (ZANAFLEX) 4 MG tablet Take 4 mg by mouth every 6 hours as needed  Historical Provider, MD   zolpidem (AMBIEN) 10 MG tablet Take by mouth nightly as needed for Sleep. Historical Provider, MD   Probiotic Product (SOLUBLE FIBER/PROBIOTICS PO) Take by mouth  Patient not taking: Reported on 5/17/2022  Historical Provider, MD           Guideline directed medical:  ARNI/ACE I/ARB: Yes  Beta blocker:   Yes  Aldosterone antagonist:  No        Physical Examination:     /62   Pulse 70   Resp 16   Wt 235 lb (106.6 kg)   SpO2 97%   BMI 35.73 kg/m²     Assessment  Charting Type: Shift assessment (chf clinic)    Neurological  Level of Consciousness: Alert (0)  Orientation Level: Oriented X4         HEENT (Head, Ears, Eyes, Nose, & Throat)  HEENT (WDL): Within Defined Limits    Respiratory  Respiratory Pattern: Regular  Respiratory Depth: Normal  Respiratory Quality/Effort: Dyspnea with exertion  L Breath Sounds: Clear  R Breath Sounds: Clear              Cardiac  Cardiac Regularity: Regular  Cardiac Rhythm: Sinus rhythm    Rhythm Interpretation  Pulse: 70         Gastrointestinal  Abdominal (WDL): Within Defined Limits               Peripheral Vascular  Peripheral Vascular (WDL): Within Defined Limits                   Genitourinary  Genitourinary (WDL): Within Defined Limits    Psychosocial  Psychosocial (WDL): Within Defined Limits                        Pulse: 70                     LAB DATA:    Last 3 BMP      Sodium (mmol/L)   Date Value   05/17/2022 131 (L)   05/10/2022 129 (L)   05/02/2022 136     Potassium (mmol/L)   Date Value   05/17/2022 4.0   05/10/2022 4.2   05/02/2022 3.9     Potassium reflex Magnesium (mmol/L)   Date Value   04/29/2022 4.1   04/02/2022 4.3     Chloride (mmol/L)   Date Value   05/17/2022 95 (L)   05/10/2022 95 (L)   05/02/2022 96 (L)     CO2 (mmol/L)   Date Value   05/17/2022 26   05/10/2022 24   05/02/2022 28     BUN (mg/dL)   Date Value   05/17/2022 13   05/10/2022 17   05/02/2022 16     Glucose (mg/dL)   Date Value   05/17/2022 183 (H)   05/10/2022 178 (H)   05/02/2022 92   04/21/2011 78   04/17/2011 98   01/05/2011 129 (H)     Calcium (mg/dL)   Date Value   05/17/2022 9.6   05/10/2022 9.7   05/02/2022 9.9       Last 3 BNP       Pro-BNP (pg/mL)   Date Value   05/17/2022 504 (H)   05/10/2022 524 (H)   05/02/2022 653 (H)          CBC:   No results for input(s): WBC, HGB, PLT in the last 72 hours. BMP:    No results for input(s): NA, K, CL, CO2, BUN, CREATININE, GLUCOSE in the last 72 hours. Hepatic:   No results for input(s): AST, ALT, ALB, BILITOT, ALKPHOS in the last 72 hours. Troponin: No results for input(s): TROPONINI in the last 72 hours. BNP: No results for input(s): BNP in the last 72 hours. Lipids: No results for input(s): CHOL, HDL in the last 72 hours. Invalid input(s): LDLCALCU  INR: No results for input(s): INR in the last 72 hours. WEIGHTS:    Wt Readings from Last 3 Encounters:   05/23/22 235 lb (106.6 kg)   05/17/22 236 lb (107 kg)   05/10/22 223 lb (101.2 kg)         TELEMETRY:  Cardiac Regularity: Regular  Cardiac Rhythm/Interpretation: NSR        ASSESSMENT:  Regina Momin is euvolemic with stable weights. He has chronic dyspnea with exertion and says that is unchanged. Says he drinks 5- 16.9 water bottles daily and is taking bumetanide 1mg BID.       Interventions completed this visit:  IV diuretics given no  Lab work obtained yes, BMP, BNP   Reviewed currently prescribed medications with patient, educated on importance of compliance and answered any questions regarding their medication  Educated on signs and symptoms of HF  Educated on low sodium diet    PLAN:  Scheduled to follow up in CHF clinic on   Future Appointments   Date Time Provider Tina Packer 6/1/2022  8:30 AM [facility] ROOM 1 [facility] [place] HOD     Given clinic phone number and aware of signs and symptoms to call with any HF change in symptoms.

## 2022-05-23 NOTE — PLAN OF CARE
Problem: Chronic Conditions and Co-morbidities  Goal: Patient's chronic conditions and co-morbidity symptoms are monitored and maintained or improved  Outcome: Progressing     Problem: Discharge Planning  Goal: Discharge to home or other facility with appropriate resources  Outcome: Progressing  Continue plan of care at CHF clinic

## 2022-05-31 ENCOUNTER — TELEPHONE (OUTPATIENT)
Dept: CARDIOLOGY CLINIC | Age: 62
End: 2022-05-31

## 2022-05-31 NOTE — TELEPHONE ENCOUNTER
Patient called stating that he feels just like he did when he came out of his stenting. He is fatigued, SOB and has dizziness with feeling like he is going to pass out. He is still unhappy with wearing the LifeVest.  He doesn't feel like the medications he was given after his procedure is working. Please advise.

## 2022-06-01 ENCOUNTER — HOSPITAL ENCOUNTER (OUTPATIENT)
Dept: OTHER | Age: 62
Setting detail: THERAPIES SERIES
Discharge: HOME | End: 2022-06-01
Payer: MEDICARE

## 2022-06-01 VITALS
BODY MASS INDEX: 35.88 KG/M2 | RESPIRATION RATE: 18 BRPM | OXYGEN SATURATION: 96 % | SYSTOLIC BLOOD PRESSURE: 135 MMHG | DIASTOLIC BLOOD PRESSURE: 63 MMHG | WEIGHT: 236 LBS | HEART RATE: 68 BPM

## 2022-06-01 LAB
ANION GAP SERPL CALCULATED.3IONS-SCNC: 10 MMOL/L (ref 7–16)
BUN BLDV-MCNC: 16 MG/DL (ref 6–23)
CALCIUM SERPL-MCNC: 9.6 MG/DL (ref 8.6–10.2)
CHLORIDE BLD-SCNC: 97 MMOL/L (ref 98–107)
CO2: 25 MMOL/L (ref 22–29)
CREAT SERPL-MCNC: 0.9 MG/DL (ref 0.7–1.2)
GFR AFRICAN AMERICAN: >60
GFR NON-AFRICAN AMERICAN: >60 ML/MIN/1.73
GLUCOSE BLD-MCNC: 259 MG/DL (ref 74–99)
POTASSIUM SERPL-SCNC: 4.4 MMOL/L (ref 3.5–5)
PRO-BNP: 412 PG/ML (ref 0–125)
SODIUM BLD-SCNC: 132 MMOL/L (ref 132–146)

## 2022-06-01 PROCEDURE — 83880 ASSAY OF NATRIURETIC PEPTIDE: CPT

## 2022-06-01 PROCEDURE — 80048 BASIC METABOLIC PNL TOTAL CA: CPT

## 2022-06-01 PROCEDURE — 99214 OFFICE O/P EST MOD 30 MIN: CPT

## 2022-06-01 PROCEDURE — 36415 COLL VENOUS BLD VENIPUNCTURE: CPT

## 2022-06-01 NOTE — PROGRESS NOTES
Congestive Heart Failure [place] [address] [facility]        Marie Wiseman   1960          Referring Provider:   Primary Care Physician: Dr. Cedric Patrick  Cardiologist: Dr. Geraldine Wilde  Nephrologist:        History of Present Illness:     Marie Wiseman is a 64 y.o. male with a history of HFrEF, most recent EF 30% from 4/1/2022. Patient Story: This is patient's fourth visit and he is wearing his life vest.Patient denies any alarms. Patient states \"I am upset, I'm tired of wearing this vest and feeling crappy. I am tired, light headed, and headache at times. \"  He does  have dyspnea with exertion, shortness of breath, or decline in overall functional capacity. He does not have orthopnea, PND, nocturnal cough or hemoptysis. He does not have abdominal distention or bloating, early satiety, anorexia/change in appetite. He does have a good urinary response to  oral diuretic. He does not have lower extremity edema. He denies lightheadedness, dizziness. He denies palpitations, syncope or near syncope. He does not complain of chest pain, pressure, discomfort. Patient had cardiac rehab yesterday and stated he did well. Allergies   Allergen Reactions    Adhesive Tape Rash         No outpatient medications have been marked as taking for the 6/1/22 encounter [facility] Encounter) with [facility] CHF ROOM 1. Prior to Visit Medications    Medication Sig Taking?  Authorizing Provider   metoprolol succinate (TOPROL XL) 100 MG extended release tablet Take 100 mg by mouth daily  Historical Provider, MD   busPIRone (BUSPAR) 15 MG tablet 7.5 mg 2 times daily   Historical Provider, MD   apixaban (ELIQUIS) 5 MG TABS tablet Take 1 tablet by mouth 2 times daily  Maddy Jo MD   sacubitril-valsartan (ENTRESTO) 24-26 MG per tablet Take 1 tablet by mouth 2 times daily  Cher Denise MD   bumetanide (BUMEX) 1 MG tablet Take 1 tablet by mouth 2 times daily  Cher Denise MD   spironolactone (ALDACTONE) 25 MG tablet Take 1 tablet by mouth daily  Megan Cali MD   clopidogrel (PLAVIX) 75 MG tablet Take 75 mg by mouth daily  Historical Provider, MD   metFORMIN (GLUCOPHAGE-XR) 500 MG extended release tablet Take 2 tablets by mouth daily (with breakfast)  Patient taking differently: Take 500 mg by mouth 2 times daily   Dimitrios Ervin,    rosuvastatin (CRESTOR) 40 MG tablet Take 1 tablet by mouth nightly  Shant Dodge,    famotidine (PEPCID) 40 MG tablet Take 40 mg by mouth daily  Historical Provider, MD   tiZANidine (ZANAFLEX) 4 MG tablet Take 4 mg by mouth every 6 hours as needed  Historical Provider, MD   zolpidem (AMBIEN) 10 MG tablet Take by mouth nightly as needed for Sleep. Historical Provider, MD   Probiotic Product (SOLUBLE FIBER/PROBIOTICS PO) Take by mouth  Patient not taking: Reported on 5/17/2022  Historical Provider, MD           Guideline directed medical:  ARNI/ACE I/ARB: Yes  Beta blocker:   Yes  Aldosterone antagonist:  No        Physical Examination:     /63   Pulse 68   Resp 18   Wt 236 lb (107 kg)   SpO2 96%   BMI 35.88 kg/m²     Assessment  Charting Type: Shift assessment (chf clinic)    Neurological  Level of Consciousness: Alert (0)  Orientation Level: Oriented X4         HEENT (Head, Ears, Eyes, Nose, & Throat)  HEENT (WDL): Within Defined Limits    Respiratory  Respiratory Pattern: Regular  Respiratory Depth: Normal  Respiratory Quality/Effort: Dyspnea with exertion  L Breath Sounds: Clear  R Breath Sounds: Clear              Cardiac  Cardiac Regularity: Regular  Cardiac Rhythm: Sinus rhythm    Rhythm Interpretation  Heart Rate: 68         Gastrointestinal  Abdominal (WDL): Within Defined Limits               Peripheral Vascular  Peripheral Vascular (WDL): Within Defined Limits                   Genitourinary  Genitourinary (WDL): Within Defined Limits    Psychosocial  Psychosocial (WDL): Within Defined Limits                        Heart Rate: 68 LAB DATA:    Last 3 BMP      Sodium (mmol/L)   Date Value   06/01/2022 132   05/23/2022 134   05/17/2022 131 (L)     Potassium (mmol/L)   Date Value   06/01/2022 4.4   05/23/2022 4.2   05/17/2022 4.0     Potassium reflex Magnesium (mmol/L)   Date Value   04/29/2022 4.1   04/02/2022 4.3     Chloride (mmol/L)   Date Value   06/01/2022 97 (L)   05/23/2022 97 (L)   05/17/2022 95 (L)     CO2 (mmol/L)   Date Value   06/01/2022 25   05/23/2022 28   05/17/2022 26     BUN (mg/dL)   Date Value   06/01/2022 16   05/23/2022 15   05/17/2022 13     Glucose (mg/dL)   Date Value   06/01/2022 259 (H)   05/23/2022 195 (H)   05/17/2022 183 (H)   04/21/2011 78   04/17/2011 98   01/05/2011 129 (H)     Calcium (mg/dL)   Date Value   06/01/2022 9.6   05/23/2022 9.5   05/17/2022 9.6       Last 3 BNP       Pro-BNP (pg/mL)   Date Value   06/01/2022 412 (H)   05/23/2022 401 (H)   05/17/2022 504 (H)          CBC:   No results for input(s): WBC, HGB, PLT in the last 72 hours. BMP:    Recent Labs     06/01/22  0857      K 4.4   CL 97*   CO2 25   BUN 16   CREATININE 0.9   GLUCOSE 259*     Hepatic:   No results for input(s): AST, ALT, ALB, BILITOT, ALKPHOS in the last 72 hours. Troponin: No results for input(s): TROPONINI in the last 72 hours. BNP: No results for input(s): BNP in the last 72 hours. Lipids: No results for input(s): CHOL, HDL in the last 72 hours. Invalid input(s): LDLCALCU  INR: No results for input(s): INR in the last 72 hours. WEIGHTS:    Wt Readings from Last 3 Encounters:   06/01/22 236 lb (107 kg)   05/23/22 235 lb (106.6 kg)   05/17/22 236 lb (107 kg)         TELEMETRY:  Cardiac Regularity: Regular  Cardiac Rhythm/Interpretation: NSR        ASSESSMENT:  Villa Molina is euvolemic with stable weights. He has chronic dyspnea with exertion and says that is unchanged. Says he drinks 5- 16.9 water bottles daily and is taking bumetanide 1mg BID.       Interventions completed this visit:  IV diuretics given no  Lab work obtained yes, BMP, BNP   Reviewed currently prescribed medications with patient, educated on importance of compliance and answered any questions regarding their medication  Educated on signs and symptoms of HF  Educated on low sodium diet    PLAN:  Scheduled to see Gracie Oakley tomorrow. Given clinic phone number and aware of signs and symptoms to call with any HF change in symptoms.

## 2022-06-01 NOTE — PLAN OF CARE
Problem: Chronic Conditions and Co-morbidities  Goal: Patient's chronic conditions and co-morbidity symptoms are monitored and maintained or improved  Outcome: Progressing     Problem: Discharge Planning  Goal: Discharge to home or other facility with appropriate resources  Outcome: Progressing   Continue plan of care at [facility] clinic

## 2022-06-01 NOTE — RESULT ENCOUNTER NOTE
He is due for repeat echo this month to assess his EF. If it is still less than 35% I will refer him to EP for an implanted defibrillator. I have explained in detail the rationale, recommendations and use of the wearable cardioverter defibrillator on previous visits, please refer to my note. His blood pressure looked okay at the CHF clinic visit, but if he is feeling like he is going to pass out he can come in for an EKG and orthostatic vitals. His blood work looks good. Recommend continuing same medications. Please ensure that his echo is scheduled.

## 2022-06-03 NOTE — TELEPHONE ENCOUNTER
Left message for patient to contact office. ----- Message from Teresa Holland MD sent at 6/1/2022  5:25 PM EDT -----  He is due for repeat echo this month to assess his EF. If it is still less than 35% I will refer him to EP for an implanted defibrillator. I have explained in detail the rationale, recommendations and use of the wearable cardioverter defibrillator on previous visits, please refer to my note. His blood pressure looked okay at the CHF clinic visit, but if he is feeling like he is going to pass out he can come in for an EKG and orthostatic vitals. His blood work looks good. Recommend continuing same medications. Please ensure that his echo is scheduled.

## 2022-06-06 NOTE — TELEPHONE ENCOUNTER
Patient returned our call and was given recommendations per Dr. Sandhya Harding. He indicates he does not wish to come in for EKG and vitals because they are usually normal and he feels like it is a waste of money. Patient was transferred to [facility] to schedule echo.

## 2022-06-16 ENCOUNTER — HOSPITAL ENCOUNTER (OUTPATIENT)
Dept: OTHER | Age: 62
Setting detail: THERAPIES SERIES
Discharge: HOME | End: 2022-06-16
Payer: MEDICARE

## 2022-06-16 VITALS
OXYGEN SATURATION: 96 % | BODY MASS INDEX: 36.04 KG/M2 | RESPIRATION RATE: 16 BRPM | WEIGHT: 237 LBS | HEART RATE: 78 BPM | DIASTOLIC BLOOD PRESSURE: 66 MMHG | SYSTOLIC BLOOD PRESSURE: 127 MMHG

## 2022-06-16 LAB
ANION GAP SERPL CALCULATED.3IONS-SCNC: 13 MMOL/L (ref 7–16)
BUN BLDV-MCNC: 12 MG/DL (ref 6–23)
CALCIUM SERPL-MCNC: 9.5 MG/DL (ref 8.6–10.2)
CHLORIDE BLD-SCNC: 96 MMOL/L (ref 98–107)
CO2: 23 MMOL/L (ref 22–29)
CREAT SERPL-MCNC: 0.7 MG/DL (ref 0.7–1.2)
GFR AFRICAN AMERICAN: >60
GFR NON-AFRICAN AMERICAN: >60 ML/MIN/1.73
GLUCOSE BLD-MCNC: 191 MG/DL (ref 74–99)
POTASSIUM SERPL-SCNC: 4.2 MMOL/L (ref 3.5–5)
PRO-BNP: 359 PG/ML (ref 0–125)
SODIUM BLD-SCNC: 132 MMOL/L (ref 132–146)

## 2022-06-16 PROCEDURE — 36415 COLL VENOUS BLD VENIPUNCTURE: CPT

## 2022-06-16 PROCEDURE — 80048 BASIC METABOLIC PNL TOTAL CA: CPT

## 2022-06-16 PROCEDURE — 83880 ASSAY OF NATRIURETIC PEPTIDE: CPT

## 2022-06-16 PROCEDURE — 99214 OFFICE O/P EST MOD 30 MIN: CPT

## 2022-06-16 NOTE — PROGRESS NOTES
Congestive Heart Failure [place] [address] [facility]        Martin Roth   1960          Referring Provider:  VAZQUEZ Ordonez-CNP  Primary Care Physician: Dr. Zonia Herrmann  Cardiologist: Dr. Kasia English   Nephrologist:        History of Present Illness:     Martin Roth is a 64 y.o. male with a history of HFrEF, most recent EF 30% from 4/1/2022. Patient Story:    He does not have dyspnea with exertion, shortness of breath, or decline in overall functional capacity. He does not have orthopnea, PND, nocturnal cough or hemoptysis. He does not have abdominal distention or bloating, early satiety, anorexia/change in appetite. He does have a good urinary response to  oral diuretic. He does not have lower extremity edema. He does report lightheadedness, dizziness. He denies palpitations, syncope or near syncope. He does not complain of chest pain, pressure, discomfort. Allergies   Allergen Reactions    Adhesive Tape Rash           Prior to Visit Medications    Medication Sig Taking?  Authorizing Provider   metoprolol succinate (TOPROL XL) 100 MG extended release tablet Take 100 mg by mouth daily  Historical Provider, MD   busPIRone (BUSPAR) 15 MG tablet 7.5 mg 2 times daily   Historical Provider, MD   apixaban (ELIQUIS) 5 MG TABS tablet Take 1 tablet by mouth 2 times daily  Maritza Almazan MD   sacubitril-valsartan (ENTRESTO) 24-26 MG per tablet Take 1 tablet by mouth 2 times daily  Sofia Clifford MD   bumetanide (BUMEX) 1 MG tablet Take 1 tablet by mouth 2 times daily  Sofia Clifford MD   spironolactone (ALDACTONE) 25 MG tablet Take 1 tablet by mouth daily  Sofia Clifford MD   clopidogrel (PLAVIX) 75 MG tablet Take 75 mg by mouth daily  Historical Provider, MD   metFORMIN (GLUCOPHAGE-XR) 500 MG extended release tablet Take 2 tablets by mouth daily (with breakfast)  Patient taking differently: Take 500 mg by mouth 2 times daily   Rego Parkrui Page, DO   rosuvastatin (CRESTOR) 40 MG tablet Take 1 tablet by mouth nightly  Suleman Dodge DO   famotidine (PEPCID) 40 MG tablet Take 40 mg by mouth daily  Historical Provider, MD   tiZANidine (ZANAFLEX) 4 MG tablet Take 4 mg by mouth every 6 hours as needed  Historical Provider, MD   zolpidem (AMBIEN) 10 MG tablet Take by mouth nightly as needed for Sleep. Historical Provider, MD     Guideline directed medical:  ARNI/ACE I/ARB: Yes  Beta blocker:   Yes  Aldosterone antagonist:  Yes        Physical Examination:     /66   Pulse 78   Resp 16   Wt 237 lb (107.5 kg)   SpO2 96%   BMI 36.04 kg/m²     Assessment  Charting Type: Shift assessment (chf clinic)    Neurological  Level of Consciousness: Alert (0)         HEENT (Head, Ears, Eyes, Nose, & Throat)  HEENT (WDL): Exceptions to WDL  Right Eye:  (glasses)  Left Eye:  (glasses)    Respiratory  Respiratory Pattern: Regular  Respiratory Depth: Normal  Respiratory Quality/Effort: Unlabored  Chest Assessment: Chest expansion symmetrical  L Breath Sounds: Clear,Diminished  R Breath Sounds: Clear,Diminished              Cardiac  Cardiac Regularity: Regular  Cardiac Rhythm: Sinus rhythm    Rhythm Interpretation  Heart Rate: 78         Gastrointestinal  Abdominal (WDL): Exceptions to WDL  Abdomen Inspection: Rounded               Peripheral Vascular  Peripheral Vascular (WDL): Within Defined Limits                   Genitourinary  Genitourinary (WDL): Within Defined Limits    Psychosocial  Psychosocial (WDL): Within Defined Limits                        Heart Rate: 78                     LAB DATA:    Last 3 BMP      Sodium (mmol/L)   Date Value   06/01/2022 132   05/23/2022 134   05/17/2022 131 (L)     Potassium (mmol/L)   Date Value   06/01/2022 4.4   05/23/2022 4.2   05/17/2022 4.0     Potassium reflex Magnesium (mmol/L)   Date Value   04/29/2022 4.1   04/02/2022 4.3     Chloride (mmol/L)   Date Value   06/01/2022 97 (L)   05/23/2022 97 (L)   05/17/2022 95 (L)     CO2 (mmol/L) Date Value   06/01/2022 25   05/23/2022 28   05/17/2022 26     BUN (mg/dL)   Date Value   06/01/2022 16   05/23/2022 15   05/17/2022 13     Glucose (mg/dL)   Date Value   06/01/2022 259 (H)   05/23/2022 195 (H)   05/17/2022 183 (H)   04/21/2011 78   04/17/2011 98   01/05/2011 129 (H)     Calcium (mg/dL)   Date Value   06/01/2022 9.6   05/23/2022 9.5   05/17/2022 9.6       Last 3 BNP       Pro-BNP (pg/mL)   Date Value   06/01/2022 412 (H)   05/23/2022 401 (H)   05/17/2022 504 (H)          CBC:   No results for input(s): WBC, HGB, PLT in the last 72 hours. BMP:    No results for input(s): NA, K, CL, CO2, BUN, CREATININE, GLUCOSE in the last 72 hours. Hepatic:   No results for input(s): AST, ALT, ALB, BILITOT, ALKPHOS in the last 72 hours. Troponin: No results for input(s): TROPONINI in the last 72 hours. BNP: No results for input(s): BNP in the last 72 hours. Lipids: No results for input(s): CHOL, HDL in the last 72 hours. Invalid input(s): LDLCALCU  INR: No results for input(s): INR in the last 72 hours. WEIGHTS:    Wt Readings from Last 3 Encounters:   06/16/22 237 lb (107.5 kg)   06/01/22 236 lb (107 kg)   05/23/22 235 lb (106.6 kg)         TELEMETRY:  Cardiac Regularity: Regular  Cardiac Rhythm/Interpretation: NSR        ASSESSMENT:  Chris Samson presents for CHF follow up with stable weights. He has chronic dyspnea with exertion and says that is unchanged. Patient has repeat echo in 2 weeks. Follow up appointment made for 1 month post echo. Patient aware to call for any increase in heart failure symptoms.       Interventions completed this visit:  IV diuretics given no  Lab work obtained yes, BMP, BNP   Reviewed currently prescribed medications with patient, educated on importance of compliance and answered any questions regarding their medication  Educated on signs and symptoms of HF  Educated on low sodium diet    PLAN:  Scheduled to follow up in CHF clinic on   Future Appointments   Date Time Provider Tina Packer   6/30/2022 11:15 AM [facility] RM 2 [facility] [place]   7/25/2022  9:00 AM [facility] ROOM 1 [facility] [place] HOD     Given clinic phone number and aware of signs and symptoms to call with any HF change in symptoms.

## 2022-06-16 NOTE — FLOWSHEET NOTE
Orthos at [address] [facility] today as follows:     06/16/22 0941   Vitals   Orthostatic B/P and Pulse?  Yes   Blood Pressure Lying 118/57   Pulse Lying 68 PER MINUTE   Blood Pressure Sitting 113/55   Pulse Sitting 74 PER MINUTE   Blood Pressure Standing 124/57   Pulse Standing 87 PER MINUTE   Level of Consciousness Alert (0)   Cardiac Rhythm Sinus rhythm   Oxygen Therapy   SpO2 96 %

## 2022-06-22 ENCOUNTER — TELEPHONE (OUTPATIENT)
Dept: CARDIOLOGY CLINIC | Age: 62
End: 2022-06-22

## 2022-06-22 NOTE — TELEPHONE ENCOUNTER
Contacted patient with lab results and recommendations per Dr. Allie Mena. Patient verbalized understanding.    ----- Message from Jyoti Jara MD sent at 6/21/2022  5:27 PM EDT -----  Blood work remains stable. Continue same plan. Awaiting echo.

## 2022-06-29 ENCOUNTER — TELEPHONE (OUTPATIENT)
Dept: CARDIOLOGY | Age: 62
End: 2022-06-29

## 2022-06-30 ENCOUNTER — HOSPITAL ENCOUNTER (OUTPATIENT)
Dept: CARDIOLOGY | Age: 62
Discharge: HOME | End: 2022-06-30
Payer: MEDICARE

## 2022-06-30 DIAGNOSIS — I42.9 CARDIOMYOPATHY, UNSPECIFIED TYPE (HCC): ICD-10-CM

## 2022-06-30 LAB
LV EF: 40 %
LVEF MODALITY: NORMAL

## 2022-06-30 PROCEDURE — 93306 TTE W/DOPPLER COMPLETE: CPT

## 2022-07-06 ENCOUNTER — TELEPHONE (OUTPATIENT)
Dept: CARDIOLOGY CLINIC | Age: 62
End: 2022-07-06

## 2022-07-07 NOTE — RESULT ENCOUNTER NOTE
EF has improved to about 40%. Can discontinue vest at this time. Continue cardiac medications without changes. Follow-up as scheduled. Should go to cardiac rehab.

## 2022-07-07 NOTE — TELEPHONE ENCOUNTER
Contacted patient with results and recommendations per Dr. Efraín Weeks. Patient verbalized understanding. He indicates that he has not been wearing the LifeVest for some time due to cost and sent it back \"long ago\".    ----- Message from Tamiko Nicolas MD sent at 7/7/2022  6:51 AM EDT -----  EF has improved to about 40%. Can discontinue vest at this time. Continue cardiac medications without changes. Follow-up as scheduled. Should go to cardiac rehab.

## 2022-07-25 ENCOUNTER — HOSPITAL ENCOUNTER (OUTPATIENT)
Dept: OTHER | Age: 62
Setting detail: THERAPIES SERIES
Discharge: HOME | End: 2022-07-25
Payer: MEDICARE

## 2022-07-25 VITALS
SYSTOLIC BLOOD PRESSURE: 118 MMHG | BODY MASS INDEX: 36.52 KG/M2 | HEART RATE: 71 BPM | RESPIRATION RATE: 16 BRPM | DIASTOLIC BLOOD PRESSURE: 60 MMHG | OXYGEN SATURATION: 96 % | WEIGHT: 240.2 LBS

## 2022-07-25 LAB
ANION GAP SERPL CALCULATED.3IONS-SCNC: 14 MMOL/L (ref 7–16)
BUN BLDV-MCNC: 17 MG/DL (ref 6–23)
CALCIUM SERPL-MCNC: 9.2 MG/DL (ref 8.6–10.2)
CHLORIDE BLD-SCNC: 96 MMOL/L (ref 98–107)
CO2: 20 MMOL/L (ref 22–29)
CREAT SERPL-MCNC: 0.8 MG/DL (ref 0.7–1.2)
GFR AFRICAN AMERICAN: >60
GFR NON-AFRICAN AMERICAN: >60 ML/MIN/1.73
GLUCOSE BLD-MCNC: 248 MG/DL (ref 74–99)
POTASSIUM SERPL-SCNC: 4.2 MMOL/L (ref 3.5–5)
PRO-BNP: 168 PG/ML (ref 0–125)
SODIUM BLD-SCNC: 130 MMOL/L (ref 132–146)

## 2022-07-25 PROCEDURE — 36415 COLL VENOUS BLD VENIPUNCTURE: CPT

## 2022-07-25 PROCEDURE — 80048 BASIC METABOLIC PNL TOTAL CA: CPT

## 2022-07-25 PROCEDURE — 83880 ASSAY OF NATRIURETIC PEPTIDE: CPT

## 2022-07-25 PROCEDURE — 99214 OFFICE O/P EST MOD 30 MIN: CPT

## 2022-07-25 ASSESSMENT — PAIN SCALES - GENERAL: PAINLEVEL_OUTOF10: 0

## 2022-07-25 NOTE — PROGRESS NOTES
Congestive Heart Failure [place] [address] [facility]        Kristen Chaudhary   1960          Referring Provider:   Primary Care Physician: Dr. Morro Hui  Cardiologist: Dr. Libby Coffey  Nephrologist:        History of Present Illness:     Kristen Chaudhary is a 64 y.o. male with a history of HFrEF, most recent EF 40% from 6/30/22    Patient Story:  He does  have dyspnea with exertion, shortness of breath, or decline in overall functional capacity. He does not have orthopnea, PND, nocturnal cough or hemoptysis. He does not have abdominal distention or bloating, early satiety, anorexia/change in appetite. He does have a good urinary response to  oral diuretic. He does not have lower extremity edema. He denies lightheadedness, dizziness. He denies palpitations, syncope or near syncope. He does not complain of chest pain, pressure, discomfort. Patient had cardiac rehab yesterday and stated he did well. Allergies   Allergen Reactions    Adhesive Tape Rash           Prior to Visit Medications    Medication Sig Taking?  Authorizing Provider   metoprolol succinate (TOPROL XL) 100 MG extended release tablet Take 100 mg by mouth daily  Historical Provider, MD   busPIRone (BUSPAR) 15 MG tablet 7.5 mg 2 times daily   Historical Provider, MD   apixaban (ELIQUIS) 5 MG TABS tablet Take 1 tablet by mouth 2 times daily  Alhaji Oakley MD   sacubitril-valsartan (ENTRESTO) 24-26 MG per tablet Take 1 tablet by mouth 2 times daily  Mo Bridges MD   bumetanide (BUMEX) 1 MG tablet Take 1 tablet by mouth 2 times daily  Mo Bridges MD   spironolactone (ALDACTONE) 25 MG tablet Take 1 tablet by mouth daily  Mo Bridges MD   clopidogrel (PLAVIX) 75 MG tablet Take 75 mg by mouth daily  Historical Provider, MD   metFORMIN (GLUCOPHAGE-XR) 500 MG extended release tablet Take 2 tablets by mouth daily (with breakfast)  Patient taking differently: Take 500 mg by mouth in the morning and 500 mg before bedtime. Dionicio Saba DO   rosuvastatin (CRESTOR) 40 MG tablet Take 1 tablet by mouth nightly  Karolyn Dodge DO   famotidine (PEPCID) 40 MG tablet Take 40 mg by mouth daily  Historical Provider, MD   tiZANidine (ZANAFLEX) 4 MG tablet Take 4 mg by mouth every 6 hours as needed  Historical Provider, MD   zolpidem (AMBIEN) 10 MG tablet Take by mouth nightly as needed for Sleep. Historical Provider, MD           Guideline directed medical:  ARNI/ACE I/ARB: Yes  Beta blocker:   Yes  Aldosterone antagonist:  YEs        Physical Examination:     /60   Pulse 71   Resp 16   Wt 240 lb 3.2 oz (109 kg)   SpO2 96%   BMI 36.52 kg/m²     Assessment  Charting Type: Shift assessment (chf clinic)    Neurological  Level of Consciousness: Alert (0)              Respiratory  Respiratory Quality/Effort: Unlabored  Chest Assessment: Chest expansion symmetrical    Breath Sounds  Right Upper Lobe: Clear  Right Middle Lobe: Clear  Right Lower Lobe: Clear  Left Upper Lobe: Clear  Left Lower Lobe: Clear         Cardiac  Cardiac Rhythm: Sinus rhythm    Rhythm Interpretation  Heart Rate: 71         Gastrointestinal  Abdominal (WDL): Within Defined Limits  Abdomen Inspection: Soft, Rounded               Peripheral Vascular  Peripheral Vascular (WDL): Within Defined Limits                   Genitourinary  Genitourinary (WDL): Within Defined Limits    Psychosocial  Psychosocial (WDL): Within Defined Limits    Pain Assessment  Pain Assessment: 0-10  Pain Level: 0                   Heart Rate: 71                     LAB DATA:    Last 3 BMP      Sodium (mmol/L)   Date Value   07/25/2022 130 (L)   06/16/2022 132   06/01/2022 132     Potassium (mmol/L)   Date Value   07/25/2022 4.2   06/16/2022 4.2   06/01/2022 4.4     Potassium reflex Magnesium (mmol/L)   Date Value   04/29/2022 4.1   04/02/2022 4.3     Chloride (mmol/L)   Date Value   07/25/2022 96 (L)   06/16/2022 96 (L)   06/01/2022 97 (L)     CO2 (mmol/L)   Date Value 07/25/2022 20 (L)   06/16/2022 23   06/01/2022 25     BUN (mg/dL)   Date Value   07/25/2022 17   06/16/2022 12   06/01/2022 16     Glucose (mg/dL)   Date Value   07/25/2022 248 (H)   06/16/2022 191 (H)   06/01/2022 259 (H)   04/21/2011 78   04/17/2011 98   01/05/2011 129 (H)     Calcium (mg/dL)   Date Value   07/25/2022 9.2   06/16/2022 9.5   06/01/2022 9.6       Last 3 BNP       Pro-BNP (pg/mL)   Date Value   07/25/2022 168 (H)   06/16/2022 359 (H)   06/01/2022 412 (H)          CBC:   No results for input(s): WBC, HGB, PLT in the last 72 hours. BMP:    Recent Labs     07/25/22  0911   *   K 4.2   CL 96*   CO2 20*   BUN 17   CREATININE 0.8   GLUCOSE 248*       Hepatic:   No results for input(s): AST, ALT, ALB, BILITOT, ALKPHOS in the last 72 hours. Troponin: No results for input(s): TROPONINI in the last 72 hours. BNP: No results for input(s): BNP in the last 72 hours. Lipids: No results for input(s): CHOL, HDL in the last 72 hours. Invalid input(s): LDLCALCU  INR: No results for input(s): INR in the last 72 hours. WEIGHTS:    Wt Readings from Last 3 Encounters:   07/25/22 240 lb 3.2 oz (109 kg)   06/16/22 237 lb (107.5 kg)   06/01/22 236 lb (107 kg)         TELEMETRY:  Cardiac Regularity: Regular  Cardiac Rhythm/Interpretation: NSR        ASSESSMENT:  Joy Rachel is euvolemic with stable weights. He states at times he is SOB however recovers with rest. Patient states his repeat echo completed and EF is 40% and life vest returned. Patient continues to go to rehab two times a week and tolerates well [facility] Patient continues to weigh himself daily,he does hydrate and has a good urine output from oral diuretic.       Interventions completed this visit:  IV diuretics given no  Lab work obtained yes, BMP, BNP   Reviewed currently prescribed medications with patient, educated on importance of compliance and answered any questions regarding their medication  Educated on signs and symptoms of HF  Educated on low sodium diet    PLAN:  Future Appointments   Date Time Provider Tina Packer   8/9/2022  1:45 PM Sarina Bernal MD [facility] [facility]   8/25/2022  9:30 AM [facility] ROOM 1 [facility] HOD          Given clinic phone number and aware of signs and symptoms to call with any HF change in symptoms.

## 2022-07-26 ENCOUNTER — TELEPHONE (OUTPATIENT)
Dept: CARDIOLOGY CLINIC | Age: 62
End: 2022-07-26

## 2022-07-26 NOTE — TELEPHONE ENCOUNTER
Left message for patient to contact office. ----- Message from Grazyna Abel MD sent at 7/26/2022 12:57 PM EDT -----  Blood work stable. Kidney function remains normal.  proBNP is down likely reflecting good control of his congestive heart failure. His blood sugar was high. Follow-up with PCP for that. Continue current plan and follow-up as scheduled.

## 2022-08-03 DIAGNOSIS — D32.0 CEREBRAL MENINGIOMA (HCC): ICD-10-CM

## 2022-08-03 LAB
BUN BLDV-MCNC: 16 MG/DL (ref 6–23)
CREAT SERPL-MCNC: 0.8 MG/DL (ref 0.7–1.2)
GFR AFRICAN AMERICAN: >60
GFR NON-AFRICAN AMERICAN: >60 ML/MIN/1.73

## 2022-08-09 ENCOUNTER — OFFICE VISIT (OUTPATIENT)
Dept: CARDIOLOGY CLINIC | Age: 62
End: 2022-08-09
Payer: MEDICARE

## 2022-08-09 ENCOUNTER — TELEPHONE (OUTPATIENT)
Dept: CARDIOLOGY CLINIC | Age: 62
End: 2022-08-09

## 2022-08-09 VITALS
DIASTOLIC BLOOD PRESSURE: 62 MMHG | HEART RATE: 78 BPM | HEIGHT: 68 IN | OXYGEN SATURATION: 98 % | RESPIRATION RATE: 16 BRPM | BODY MASS INDEX: 37.16 KG/M2 | SYSTOLIC BLOOD PRESSURE: 128 MMHG | WEIGHT: 245.2 LBS

## 2022-08-09 DIAGNOSIS — I25.10 CAD IN NATIVE ARTERY: Primary | ICD-10-CM

## 2022-08-09 DIAGNOSIS — I48.0 PAF (PAROXYSMAL ATRIAL FIBRILLATION) (HCC): ICD-10-CM

## 2022-08-09 DIAGNOSIS — I25.5 ISCHEMIC CARDIOMYOPATHY: ICD-10-CM

## 2022-08-09 DIAGNOSIS — I50.42 CHRONIC COMBINED SYSTOLIC AND DIASTOLIC CONGESTIVE HEART FAILURE (HCC): ICD-10-CM

## 2022-08-09 PROCEDURE — 93000 ELECTROCARDIOGRAM COMPLETE: CPT | Performed by: INTERNAL MEDICINE

## 2022-08-09 PROCEDURE — 99214 OFFICE O/P EST MOD 30 MIN: CPT | Performed by: INTERNAL MEDICINE

## 2022-08-09 RX ORDER — CARVEDILOL 12.5 MG/1
12.5 TABLET ORAL 2 TIMES DAILY
Qty: 60 TABLET | Refills: 5 | Status: SHIPPED | OUTPATIENT
Start: 2022-08-09

## 2022-08-09 NOTE — PROGRESS NOTES
OUTPATIENT CARDIOLOGY FOLLOW-UP    Name: Rafael Hutchinson    Age: 64 y.o. Primary Care Physician: Jose Gunn MD    Date of Service: 8/9/2022    Chief Complaint:   Chief Complaint   Patient presents with    Coronary Artery Disease        Interim History:   Here for follow-up of cardiomyopathy. Seen by me as new patient 3/10/2022 office visit for abnormal stress test showing fixed inferior defect with EF in the 20s, and echo showing an EF in the 35s. Heart catheterization 3/16/2022 subsequently revealed multivessel coronary disease and ended up getting stents to the mid LAD, OM 2, and RCA. He was admitted again 3/31/2022 for chest pain and shortness of breath and was treated for decompensated heart failure in the setting of accelerated hypertension and acute pulmonary edema. He was diuresed, GDMT was optimized and was recommended wearable cardioverter defibrillator upon discharge. Repeat echo showed improvement in EF to 40% and last has been discontinued. He denies edema but continues to have shortness of breath with exertion. He thinks metoprolol is causing him to have gas and dizziness. He would like to change that medication. States apixaban and sacubitril/valsartan are very expensive, and his insurance would not pay for cardiac rehab so he stopped going. Was previously provided with financial assistance information but he never followed up on that.     Review of Systems:   Negative except as described above    Past Medical History:  Past Medical History:   Diagnosis Date    Arthritis     ASK WHERE    Blood disorder     Bone marrow disorder     Bone spur     IN CERVICAL REGION    CAD in native artery 03/16/2022    Cerebral meningioma (HCC)     Cervical radiculopathy     CHF (congestive heart failure) (HCC)     Claustrophobia     Coronary artery disease involving native coronary artery     Diabetes mellitus (HCC)     Disc displacement, lumbar     Enlarged heart     Enlarged prostate     GERD (gastroesophageal reflux disease)     Hemoglobin disorder (HCC) 2012    HIGH. .. HAS F/U ON 12 WITH DR. BARBARA Ledbetter     Hyperlipidemia     Hypertension     Low back pain     Osteolytic lesion     Panic anxiety syndrome     S/P angioplasty with stent 2022    LAD, OM, RCA    Sleep apnea     awaiting [facility] back ordered       Past Surgical History:  Past Surgical History:   Procedure Laterality Date    BACK SURGERY  8/10/11    DR. Lola Crum AT [facility]. .. FUSION    BRAIN SURGERY      CRANIECTOMY  3/22/10    RIGHT TEMPORAL REGION    CRANIOTOMY  5/10/10    RIGHT FRONTOTEMPORAL [address] [address]    DR. RIVERS       Family History:  Family History   Problem Relation Age of Onset    Heart Disease Mother     Prostate Cancer Father        Social History:  Social History     Tobacco Use    Smoking status: Former     Packs/day: 1.00     Types: Cigarettes     Quit date: 3/10/2022     Years since quittin.4    Smokeless tobacco: Never   Vaping Use    Vaping Use: Never used   Substance Use Topics    Alcohol use: Yes     Comment: DRINKS BEER WEEKLY; UNKNOWN AMOUNT    Drug use: No        Allergies: Allergies   Allergen Reactions    Adhesive Tape Rash       Current Medications:    Current Outpatient Medications:     carvedilol (COREG) 12.5 MG tablet, Take 1 tablet by mouth in the morning and 1 tablet before bedtime. , Disp: 60 tablet, Rfl: 5    busPIRone (BUSPAR) 15 MG tablet, 7.5 mg 2 times daily , Disp: , Rfl:     apixaban (ELIQUIS) 5 MG TABS tablet, Take 1 tablet by mouth 2 times daily, Disp: 60 tablet, Rfl: 0    sacubitril-valsartan (ENTRESTO) 24-26 MG per tablet, Take 1 tablet by mouth 2 times daily, Disp: 60 tablet, Rfl: 3    bumetanide (BUMEX) 1 MG tablet, Take 1 tablet by mouth 2 times daily, Disp: 30 tablet, Rfl: 3    spironolactone (ALDACTONE) 25 MG tablet, Take 1 tablet by mouth daily, Disp: 30 tablet, Rfl: 3    clopidogrel (PLAVIX) 75 MG tablet, Take 75 mg by mouth daily, Disp: , Rfl:     metFORMIN (GLUCOPHAGE-XR) 500 MG extended release tablet, Take 2 tablets by mouth daily (with breakfast) (Patient taking differently: Take 500 mg by mouth in the morning and 500 mg before bedtime.), Disp: 30 tablet, Rfl: 3    rosuvastatin (CRESTOR) 40 MG tablet, Take 1 tablet by mouth nightly, Disp: 30 tablet, Rfl: 0    famotidine (PEPCID) 40 MG tablet, Take 40 mg by mouth daily, Disp: , Rfl:     tiZANidine (ZANAFLEX) 4 MG tablet, Take 4 mg by mouth every 6 hours as needed, Disp: , Rfl:     zolpidem (AMBIEN) 10 MG tablet, Take by mouth nightly as needed for Sleep., Disp: , Rfl:     Physical Exam:  /62   Pulse 78   Resp 16   Ht 5' 8\" (1.727 m)   Wt 245 lb 3.2 oz (111.2 kg)   SpO2 98%   BMI 37.28 kg/m²   Wt Readings from Last 3 Encounters:   08/09/22 245 lb 3.2 oz (111.2 kg)   07/25/22 240 lb 3.2 oz (109 kg)   06/16/22 237 lb (107.5 kg)     Appearance: Awake, alert and oriented x 3, no acute respiratory distress  Skin: Intact, no rash  Head: Normocephalic, atraumatic  Eyes: EOMI, no conjunctival erythema  ENMT: No pharyngeal erythema, MMM, no rhinorrhea  Neck: Supple, no elevated JVP, no carotid bruits  Lungs: Clear to auscultation bilaterally. No wheezes, rales, or rhonchi. Cardiac: Irregular rhythm with a normal rate, +S1S2, no murmurs apparent.     Abdomen: Soft, nontender, +bowel sounds  Extremities: Moves all extremities x 4, no lower extremity edema  Neurologic: No focal motor deficits apparent, normal mood and affect, alert and oriented x 3  Peripheral Pulses: Intact posterior tibial pulses bilaterally    Laboratory Tests:  Lab Results   Component Value Date    CREATININE 0.8 08/03/2022    BUN 16 08/03/2022     (L) 07/25/2022    K 4.2 07/25/2022    CL 96 (L) 07/25/2022    CO2 20 (L) 07/25/2022     Lab Results   Component Value Date/Time    MG 1.9 04/02/2022 05:40 AM     Lab Results   Component Value Date    WBC 8.8 04/29/2022    HGB 13.0 04/29/2022    HCT 36.7 (L) 04/29/2022    MCV 92.7 04/29/2022  2022     Lab Results   Component Value Date    ALT 15 2022    AST 12 2022    ALKPHOS 65 2022    BILITOT 0.3 2022     Lab Results   Component Value Date    CKTOTAL 73 2010    CKMB 1.1 2010    TROPONINI <0.01 2015    TROPONINI 0.02 2010    TROPONINI 0.01 2010     Lab Results   Component Value Date    INR 1.1 03/15/2022    INR 1.0 2011    INR 1.0 2010    PROTIME 11.7 03/15/2022    PROTIME 11.6 2011    PROTIME 11.4 2010     Lab Results   Component Value Date    TSH 4.710 (H) 2022     Lab Results   Component Value Date    LABA1C 6.9 (H) 2022     No results found for: EAG  Lab Results   Component Value Date    CHOL 213 (H) 2010     Lab Results   Component Value Date    TRIG 191 (H) 2010     Lab Results   Component Value Date    HDL 40.0 (A) 2010     Lab Results   Component Value Date    LDLCALC 135 (H) 2010     No results found for: LABVLDL, VLDL  No results found for: CHOLHDLRATIO  No results for input(s): PROBNP in the last 72 hours. Cardiac Tests:  EC2022: Atrial fibrillation 80 bpm.  Normal axis and intervals. Prior anterior infarct. Nonspecific T wave changes    2022: Sinus rhythm 78 beats minute. Normal axis normal intervals. Nonspecific T wave changes. Echocardiogram:   TTE 22   Summary   Left ventricle is borderline dilated. LVEDD 5.8 cm. LV systolic function is moderately reduced. Ejection fraction is visually estimated at 40%. Grade I diastolic dysfunction. No regional wall motion abnormalities seen. Mild left ventricular concentric hypertrophy noted. Normal right ventricular size and function. No significant valvular abnormalities. Compared to studies from 2022 and 2022, LVEF has improved. TTE 22 Franki Horne       Summary   difficult visualization. Contrast used. moderate global hypokinesis. Inferior akinesis.  EF 30%   Severely reduced left ventricular systolic function. Stage I diastolic dysfunction. Trace to mild mitral regurgitation. Limited TTE 4/1/2022 [facility]  Dilated LV, global hypokinesis with posterior akinesis  EF 30%  Mild MR    Stress test:    Pharmacologic stress 12/13/2021  Gated SPECT left ventricular ejection fraction was calculated to be 25%, with inferior hypokinesis. Impression:    ECG during the infusion did not change. The myocardial perfusion imaging was abnormal.    The abnormality was a a large sized fixed defect in the inferior wall suggestive of a prior MI     Overall left ventricular systolic function was abnormal with regional wall motion abnormalities. Intermediate risk general pharmacologic stress test.      Cardiac catheterization:   LHC/PCI 3/16/22 [facility]      IFR of the ostial LAD: 0.95, 0.95, 0.94. Angiographic Results/findings:  Left Main: No angiographically significant stenosis. LAD: Ostial diffuse eccentric 50% stenosis. Mid long diffuse 80% stenosis. D1: Mild luminal irregularities. D2: No angiographically significant stenosis. Anthonette Dacia Cx: Codominant vessel. No angiographically significant stenosis. .  OM1: Large vessel. Mid mild luminal irregularities. There is a small lateral branch that is a 1.8 to 2 mm vessel. This has an ostial 99% subtotal occlusion. Not amenable to PCI. Anthonette Dacia OM2: 2.0 mm vessel. Mid diffuse 90% stenosis. OM 3: Tiny vessel. OM 4: No angiographically significant stenosis. OM 5: (PDA) no angiographically significant stenosis. .  Ramus: Absent. RCA: Mid diffuse eccentric 80% stenosis. .  PDA: Off of the circumflex as above. .  PLB: No angiographically significant stenosis. #Percutaneous coronary artery intervention of the mid LAD with a 2.5 x 22 mm drug-eluting stent. #Percutaneous coronary artery intervention of the OM2 with a 2.0 x 12 mm drug-eluting stent. #Percutaneous coronary intervention of the mid RCA with a 3.25 x 15 mm drug-eluting stent.   #iFR of the ostial LAD. Orders Placed This Encounter   Procedures    EKG 12 lead        Requested Prescriptions     Signed Prescriptions Disp Refills    carvedilol (COREG) 12.5 MG tablet 60 tablet 5     Sig: Take 1 tablet by mouth in the morning and 1 tablet before bedtime. ASSESSMENT / PLAN:  Multivessel CAD status post multivessel PCI to mLAD, OM2, mRCA 3/2022  Ischemic cardiomyopathy. EF 25% by SPECT, 30% by echo -> 40%  Chronic HFmEF. He is euvolemic. NYHA class II/ACC stage C  New onset atrial fibrillation. Initially noted 4/12/2022 office visit. [id number] at least 4. Spontaneously converted to sinus  Hyponatremia  Hypertension, well controlled  Type 2 diabetes, on Metformin. A1c 6.9%  Hyperlipidemia, on ezetimibe. Statin intolerant  NISH, untreated, intolerant to CPAP and sent back  History of cerebral meningioma status post surgical resection  GERD  Chronic neck pain, back pain  Obesity  Tobacco abuse, quit  Severe anxiety    Recommendations:  Appears euvolemic. He is still symptomatic. He is maintaining sinus rhythm.     Continue bumetanide 1 mg twice daily  If sodium remains low may decrease to once daily, has CHF clinic appointment later this month  Continue GDMT for cardiomyopathy  Discontinue metoprolol succinate, start carvedilol 12.5 mg twice daily due to perceived intolerance of the metoprolol  Sacubitril/valsartan 24-26 mg twice daily  Spironolactone 25 mg daily  Recommend SGLT2 inhibitor -patient declined  Continue apixaban plus clopidogrel till 3/2023, then change clopidogrel to low-dose aspirin  Attempted to provide samples today, only sacubitril/valsartan available, and financial assistance information again provided  Continue statin  Recommend cardiac rehab  Aggressive risk factor modification, including smoking cessation recommended  Follow-up in 6 months or sooner if need arises    The patient's current medication list, allergies, problem list and results of all previously ordered testing were reviewed at today's visit.     Margaux Ruiz MD, [address] [facility]

## 2022-08-09 NOTE — TELEPHONE ENCOUNTER
Patient seen in office today and given Entresto 24/26 samples.     2 bottles, 28 tablets each bottle  Lot [id number]  Expiration 10/2024

## 2022-08-16 ENCOUNTER — TELEPHONE (OUTPATIENT)
Dept: CARDIOLOGY CLINIC | Age: 62
End: 2022-08-16

## 2022-08-16 NOTE — TELEPHONE ENCOUNTER
Patient stopped in stating that since taking carvedilol for the past 7 days, he has had extreme muscle aches and dizziness. He is asking for another medication to take in place of the carvedilol. Please advise.

## 2022-08-16 NOTE — TELEPHONE ENCOUNTER
The carvedilol was in place of the metoprolol, to which he was intolerant. I am not sure he is going to tolerate any beta-blocker. He can try reducing the dose of carvedilol to 6.25 mg twice daily, or go back to metoprolol succinate. Beta-blockers do not cause muscle aches.

## 2022-08-16 NOTE — TELEPHONE ENCOUNTER
Contacted patient with recommendations per Dr. Winston Boland. Patient indicates that he will try the reduced dose of carvedilol and let us know if this relieves his symptoms. Medication list adjusted.

## 2022-08-22 ENCOUNTER — APPOINTMENT (OUTPATIENT)
Dept: ULTRASOUND IMAGING | Age: 62
End: 2022-08-22
Payer: MEDICARE

## 2022-08-22 ENCOUNTER — APPOINTMENT (OUTPATIENT)
Dept: GENERAL RADIOLOGY | Age: 62
End: 2022-08-22
Payer: MEDICARE

## 2022-08-22 ENCOUNTER — HOSPITAL ENCOUNTER (EMERGENCY)
Age: 62
Discharge: LEFT BEFORE DISCHARGE | End: 2022-08-22
Attending: EMERGENCY MEDICINE
Payer: MEDICARE

## 2022-08-22 ENCOUNTER — APPOINTMENT (OUTPATIENT)
Dept: CT IMAGING | Age: 62
End: 2022-08-22
Payer: MEDICARE

## 2022-08-22 VITALS
OXYGEN SATURATION: 98 % | WEIGHT: 233 LBS | SYSTOLIC BLOOD PRESSURE: 139 MMHG | RESPIRATION RATE: 18 BRPM | BODY MASS INDEX: 35.43 KG/M2 | HEART RATE: 78 BPM | DIASTOLIC BLOOD PRESSURE: 73 MMHG | TEMPERATURE: 97.4 F

## 2022-08-22 DIAGNOSIS — R07.9 CHEST PAIN, UNSPECIFIED TYPE: ICD-10-CM

## 2022-08-22 DIAGNOSIS — R30.0 DYSURIA: Primary | ICD-10-CM

## 2022-08-22 LAB
ALBUMIN SERPL-MCNC: 4.3 G/DL (ref 3.5–5.2)
ALP BLD-CCNC: 75 U/L (ref 40–129)
ALT SERPL-CCNC: 8 U/L (ref 0–40)
ANION GAP SERPL CALCULATED.3IONS-SCNC: 11 MMOL/L (ref 7–16)
AST SERPL-CCNC: 7 U/L (ref 0–39)
BASOPHILS ABSOLUTE: 0.05 E9/L (ref 0–0.2)
BASOPHILS RELATIVE PERCENT: 0.5 % (ref 0–2)
BILIRUB SERPL-MCNC: 0.3 MG/DL (ref 0–1.2)
BILIRUBIN URINE: NEGATIVE
BLOOD, URINE: NEGATIVE
BUN BLDV-MCNC: 17 MG/DL (ref 6–23)
CALCIUM SERPL-MCNC: 9.8 MG/DL (ref 8.6–10.2)
CHLORIDE BLD-SCNC: 94 MMOL/L (ref 98–107)
CLARITY: CLEAR
CO2: 25 MMOL/L (ref 22–29)
COLOR: YELLOW
CREAT SERPL-MCNC: 0.8 MG/DL (ref 0.7–1.2)
EKG ATRIAL RATE: 72 BPM
EKG P AXIS: 17 DEGREES
EKG P-R INTERVAL: 182 MS
EKG Q-T INTERVAL: 414 MS
EKG QRS DURATION: 92 MS
EKG QTC CALCULATION (BAZETT): 453 MS
EKG R AXIS: 50 DEGREES
EKG T AXIS: 89 DEGREES
EKG VENTRICULAR RATE: 72 BPM
EOSINOPHILS ABSOLUTE: 0.23 E9/L (ref 0.05–0.5)
EOSINOPHILS RELATIVE PERCENT: 2.3 % (ref 0–6)
GFR AFRICAN AMERICAN: >60
GFR NON-AFRICAN AMERICAN: >60 ML/MIN/1.73
GLUCOSE BLD-MCNC: 257 MG/DL (ref 74–99)
GLUCOSE URINE: NEGATIVE MG/DL
HCT VFR BLD CALC: 33.6 % (ref 37–54)
HEMOGLOBIN: 11.5 G/DL (ref 12.5–16.5)
IMMATURE GRANULOCYTES #: 0.23 E9/L
IMMATURE GRANULOCYTES %: 2.3 % (ref 0–5)
KETONES, URINE: NEGATIVE MG/DL
LACTIC ACID: 1.3 MMOL/L (ref 0.5–2.2)
LEUKOCYTE ESTERASE, URINE: NEGATIVE
LIPASE: 29 U/L (ref 13–60)
LYMPHOCYTES ABSOLUTE: 1.78 E9/L (ref 1.5–4)
LYMPHOCYTES RELATIVE PERCENT: 17.7 % (ref 20–42)
MCH RBC QN AUTO: 31.3 PG (ref 26–35)
MCHC RBC AUTO-ENTMCNC: 34.2 % (ref 32–34.5)
MCV RBC AUTO: 91.3 FL (ref 80–99.9)
MONOCYTES ABSOLUTE: 0.83 E9/L (ref 0.1–0.95)
MONOCYTES RELATIVE PERCENT: 8.3 % (ref 2–12)
NEUTROPHILS ABSOLUTE: 6.93 E9/L (ref 1.8–7.3)
NEUTROPHILS RELATIVE PERCENT: 68.9 % (ref 43–80)
NITRITE, URINE: NEGATIVE
PDW BLD-RTO: 12.6 FL (ref 11.5–15)
PH UA: 6 (ref 5–9)
PLATELET # BLD: 324 E9/L (ref 130–450)
PMV BLD AUTO: 10.1 FL (ref 7–12)
POTASSIUM REFLEX MAGNESIUM: 3.8 MMOL/L (ref 3.5–5)
PRO-BNP: 222 PG/ML (ref 0–125)
PROTEIN UA: NEGATIVE MG/DL
RBC # BLD: 3.68 E12/L (ref 3.8–5.8)
SODIUM BLD-SCNC: 130 MMOL/L (ref 132–146)
SPECIFIC GRAVITY UA: <=1.005 (ref 1–1.03)
TOTAL PROTEIN: 8.6 G/DL (ref 6.4–8.3)
TROPONIN, HIGH SENSITIVITY: 9 NG/L (ref 0–11)
UROBILINOGEN, URINE: 0.2 E.U./DL
WBC # BLD: 10.1 E9/L (ref 4.5–11.5)

## 2022-08-22 PROCEDURE — 76870 US EXAM SCROTUM: CPT

## 2022-08-22 PROCEDURE — 83605 ASSAY OF LACTIC ACID: CPT

## 2022-08-22 PROCEDURE — 83880 ASSAY OF NATRIURETIC PEPTIDE: CPT

## 2022-08-22 PROCEDURE — 84484 ASSAY OF TROPONIN QUANT: CPT

## 2022-08-22 PROCEDURE — 93005 ELECTROCARDIOGRAM TRACING: CPT | Performed by: PHYSICIAN ASSISTANT

## 2022-08-22 PROCEDURE — 85025 COMPLETE CBC W/AUTO DIFF WBC: CPT

## 2022-08-22 PROCEDURE — 36415 COLL VENOUS BLD VENIPUNCTURE: CPT

## 2022-08-22 PROCEDURE — 81003 URINALYSIS AUTO W/O SCOPE: CPT

## 2022-08-22 PROCEDURE — 87088 URINE BACTERIA CULTURE: CPT

## 2022-08-22 PROCEDURE — 83690 ASSAY OF LIPASE: CPT

## 2022-08-22 PROCEDURE — 71046 X-RAY EXAM CHEST 2 VIEWS: CPT

## 2022-08-22 PROCEDURE — 74176 CT ABD & PELVIS W/O CONTRAST: CPT

## 2022-08-22 PROCEDURE — 80053 COMPREHEN METABOLIC PANEL: CPT

## 2022-08-22 PROCEDURE — 99285 EMERGENCY DEPT VISIT HI MDM: CPT

## 2022-08-22 RX ORDER — TAMSULOSIN HYDROCHLORIDE 0.4 MG/1
0.4 CAPSULE ORAL DAILY
Qty: 14 CAPSULE | Refills: 0 | Status: SHIPPED | OUTPATIENT
Start: 2022-08-22 | End: 2022-08-26

## 2022-08-22 NOTE — ED NOTES
Department of Emergency Medicine    FIRST PROVIDER TRIAGE NOTE             Independent MLP           8/22/22  10:04 AM EDT    Date of Encounter: 8/22/22   MRN: [id number]    Vitals:    08/22/22 0958 08/22/22 1009   BP:  133/73   Pulse: 81    Resp: 18    Temp: 97.4 °F (36.3 °C)    TempSrc: Oral    SpO2: 99%    Weight:  233 lb (105.7 kg)      HPI: Conrad Naranjo is a 58 y.o. male who presents to the ED for Dysuria (Was seen at urgent care; given macrobid from urgent care and they told him to stop taking; having bilateral flank pain and still having dysuria ) and Chest Pain (\"Discomfort\" since getting MRI dye )  Bilateral flank pain and \"feels like bladder is full\"  Also reports lower abdominal pain    Was started on Microbid a couple days ago from [facility]    ROS: Negative for cp, sob, or fever. Physical Exam:   Gen Appearance/Constitutional: alert  CV: regular rate  Abdomen: suprapubic tenderness      Initial Plan of Care: All treatment areas with department are currently occupied. Plan to order/Initiate the following while awaiting opening in ED: labs and imaging studies.     Initial Plan of Care: Initiate Treatment-Testing, Proceed toTreatment Area When Bed Available for ED Attending/MLP to Continue Care    Electronically signed by Maria Del Rosario Nash PA-C   DD: 8/22/22       Maria Del Rosario Nash PA-C  08/22/22 1019

## 2022-08-22 NOTE — ED NOTES
Patient voided 200cc in urinal. 0-60cc post void residual on bladder scanner     Andrzej Tineo RN  08/22/22 3839

## 2022-08-22 NOTE — ED PROVIDER NOTES
HPI:  8/22/22, Time: 5:11 PM EDT         Ernesto Joel is a 58 y.o. male presenting to the ED for dysuria beginning Monday. Symptoms have been moderate in severity and constant with no exacerbating or alleviating factors. He reports associated symptoms of lower abdominal discomfort. He states he has difficulty starting a urinary stream, and he feels like he cannot empty his bladder. He reports a history of prostate issues, and he states he previously saw Dr. Yee Blanchard. He is not currently on flomax. He denies documented fevers, abdominal pain, emesis, and diarrhea. He states his symptoms started after he received dye for a brain MRI to evaluate a non cancerous brain tumor for which he follows with Dr. Chapman Officer. He states he went to an urgent care on Wednesday, and he was started on macrobid. He took the medication for several days, and then he was called and told to stop the antibiotic because his urine culture did not grow anything. He denies testicular or scrotal pain or swelling. Patient also reports chest discomfort, midsternal, nonradiating, no exacerbating or alleviating factors, intermittent since Monday. He has a history of CAD/MI with cardiac cath in March requiring multiple stents. He is on Eliquis and plavix, and he reports medication compliance. No active chest pain. He denies syncope, shortness of breath, hemoptysis, leg edema, and calf tenderness. He also had a history of CHF, and he has been taking his diuretics as prescribed.      Review of Systems:   Pertinent positives and negatives are stated within HPI, all other systems reviewed and are negative.          --------------------------------------------- PAST HISTORY ---------------------------------------------  Past Medical History:  has a past medical history of Arthritis, Blood disorder, Bone marrow disorder, Bone spur, CAD in native artery, Cerebral meningioma ([facility] [address] Cervical radiculopathy, CHF (congestive heart failure) ([facility] [address] Claustrophobia, Coronary artery disease involving native coronary artery, Diabetes mellitus ([facility] [address] Disc displacement, lumbar, Enlarged heart, Enlarged prostate, GERD (gastroesophageal reflux disease), Hemoglobin disorder ([facility] [address] Hernia, Hyperlipidemia, Hypertension, Low back pain, Osteolytic lesion, Panic anxiety syndrome, S/P angioplasty with stent, and Sleep apnea. Past Surgical History:  has a past surgical history that includes hernia repair (1989); craniectomy (3/22/10); craniotomy (5/10/10); back surgery (8/10/11); and brain surgery. Social History:  reports that he quit smoking about 5 months ago. His smoking use included cigarettes. He smoked an average of 1 pack per day. He has never used smokeless tobacco. He reports current alcohol use. He reports that he does not use drugs. Family History: family history includes Heart Disease in his mother; Prostate Cancer in his father. The patients home medications have been reviewed.     Allergies: Adhesive tape    -------------------------------------------------- RESULTS -------------------------------------------------  All laboratory and radiology results have been personally reviewed by myself   LABS:  Results for orders placed or performed during the hospital encounter of 08/22/22   Urinalysis   Result Value Ref Range    Color, UA Yellow Straw/Yellow    Clarity, UA Clear Clear    Glucose, Ur Negative Negative mg/dL    Bilirubin Urine Negative Negative    Ketones, Urine Negative Negative mg/dL    Specific Gravity, UA <=1.005 1.005 - 1.030    Blood, Urine Negative Negative    pH, UA 6.0 5.0 - 9.0    Protein, UA Negative Negative mg/dL    Urobilinogen, Urine 0.2 <2.0 E.U./dL    Nitrite, Urine Negative Negative    Leukocyte Esterase, Urine Negative Negative   CBC with Auto Differential   Result Value Ref Range    WBC 10.1 4.5 - 11.5 E9/L    RBC 3.68 (L) 3.80 - 5.80 E12/L    Hemoglobin 11.5 (L) 12.5 - 16.5 g/dL    Hematocrit 33.6 (L) 37.0 - 54.0 %    MCV 91.3 80.0 - testicles and imaged epididymis. No   evidence of intra testicular mass. Normal testicular vascularity. 2.  Slightly increased vascularity in the soft tissues along the lateral   margin of the right testicle. Possibly indicates soft tissue infection such   as cellulitis. No evidence of abscess formation at this time      RECOMMENDATIONS:   Consider screening CT scan of the pelvis without contrast if there is concern   for the presence of a gas-forming organism. Field of view should be extended   to the proximal right femur. CT ABDOMEN PELVIS WO CONTRAST Additional Contrast? None   Final Result   1. Low-density mass in the left seminal vesicle with associated inflammatory   stranding may represent seminal vesicle abscess. 2. Mild thickening of urinary bladder wall could be secondary to cystitis or   chronic bladder outlet obstruction. 3. Extensive colonic diverticulosis without evidence of diverticulitis. XR CHEST (2 VW)   Final Result   No active cardiopulmonary disease.             ------------------------- NURSING NOTES AND VITALS REVIEWED ---------------------------   The nursing notes within the ED encounter and vital signs as below have been reviewed. /73   Pulse 78   Temp 97.4 °F (36.3 °C) (Oral)   Resp 18   Wt 233 lb (105.7 kg)   SpO2 98%   BMI 35.43 kg/m²   Oxygen Saturation Interpretation: Normal      ---------------------------------------------------PHYSICAL EXAM--------------------------------------      Constitutional/General: Alert and oriented x3, well appearing, non toxic in NAD  Head: Normocephalic and atraumatic  Eyes: PERRL, EOMI  Mouth: Oropharynx clear, handling secretions, no trismus  Neck: Supple, full ROM,   Pulmonary: Lungs clear to auscultation bilaterally, no wheezes, rales, or rhonchi. Not in respiratory distress  Cardiovascular:  Regular rate and rhythm, no murmurs, gallops, or rubs.  2+ distal pulses  Abdomen: Soft, non tender, non distended, : external exam normal, penis normal, no lesions, no testicular swelling or tenderness, no crepitus, no erythema, no evidence of cellulitis or tapan's   Extremities: Moves all extremities x 4. Warm and well perfused. No leg edema, no calf tenderness. Skin: warm and dry without rash  Neurologic: GCS 15, no focal motor or sensory deficits   Psych: Normal Affect. Behavior normal.      ------------------------------ ED COURSE/MEDICAL DECISION MAKING----------------------  Medications - No data to display    Medical Decision Making/ED COURSE:   Patient is a 80-year-old male presenting with dysuria and chest discomfort since receiving gadolinium with an outpatient MRI. In the ED, patient was hemodynamically stable and afebrile. On exam, patient was overall well-appearing with a benign abdominal exam.  On  exam, he had no evidence of Tapan's no obvious abscess or evidence of cellulitis to his scrotum or groin. Labs, CXR, CT AP, and scrotal US obtained. I reviewed and interpreted labs. Labs were reassuring. Mild hyponatremia of 130 is baseline. Patient had hyperglycemia but no evidence of DKA. He is a known diabetic. Urinalysis does not appear infected. High-sensitivity troponin is negative. No acute EKG changes. Imaging showed concern for possible scrotal abscess versus cellulitis but clinically patient has no evidence of this. In regards to patient's dysuria, I suspect chronic bladder outlet obstruction. Patient's post void residuals less than 60 in the ED so he does not require a Oliver catheter. Plan to place on Flomax. I discussed this plan with urology who is in agreement for outpatient follow-up. In regards to patient's chest pain, it is somewhat atypical for cardiac pain however he has an extensive cardiac history including recent heart attack with stent placement. Patient has no tachycardia or hypoxia. He is anticoagulated on Eliquis. PE is not suspected.   I did recommend admission for cardiac obs, patient has respectfully declined stating he wants to be discharged home. He understands the risks of leaving AMA. I advised that he follow-up with his PCP and cardiologist as soon as possible for further outpatient monitoring. I encouraged him to return to the ED at any time if he would like further treatment. Patient remained hemodynamically stable throughout ED course. ED Course as of 08/22/22 2041   Mon Aug 22, 2022   1553 EKG: This EKG is signed and interpreted by me. Rate: 72  Rhythm: Sinus  Interpretation: Normal sinus rhythm, normal OH interval, normal QRS, normal axis, normal QT interval, no acute ST or T wave changes, nonspecific T wave abnormalities appear unchanged  Comparison: stable as compared to patient's most recent EKG   [JA]   1848 Post void residual <60 cc [JA]   1925 Urology was consulted. I discussed the findings. I discussed that clinically patient has no evidence of cellulitis, abscess, or Colleen's. Findings appear to be chronic in nature. There is no evidence of infection on urinalysis, and additionally patient states he recently had a negative urine culture. Symptoms do not appear to be infectious in origin. Plan to place the patient on Flomax and have him follow-up not emergently as an outpatient. [JA]   1926 I discussed with the patient that due to his chest pain and extensive cardiac history I do recommend admission for observation. Patient is adamant that he does not want to be admitted and does not believe that is his heart. I discussed that there is no evidence of ACS at this time but there is possibility of angina with atypical symptoms. Patient demonstrates understanding and continues to respectfully declined admission. He states he will follow-up as an outpatient. Patient would like to leave against medical advice. Patient is awake, alert, and answering questions appropriately. Patient has medical decision making capacity. Patient does not appear clinically intoxicated. Patient is not suicidal or homicidal. Understands risks of leaving against medical advice, including heart attack, serious disability, and death. Encouraged to follow with their doctor as soon as possible and return to the emergency department if they would like further treatment. [JA]      ED Course User Index  [JA] Vini Pickard MD       Discharge Medication List as of 8/22/2022  7:32 PM        START taking these medications    Details   tamsulosin (FLOMAX) 0.4 MG capsule Take 1 capsule by mouth daily for 14 days, Disp-14 capsule, R-0Print           Vini Pickard MD      Counseling: The emergency provider has spoken with the patient and discussed todays results, in addition to providing specific details for the plan of care and counseling regarding the diagnosis and prognosis. Questions are answered at this time and they are agreeable with the plan.      --------------------------------- IMPRESSION AND DISPOSITION ---------------------------------    IMPRESSION  1. Dysuria    2. Chest pain, unspecified type        DISPOSITION  Disposition: AMA  Patient condition is stable      NOTE: This report was transcribed using voice recognition software.  Every effort was made to ensure accuracy; however, inadvertent computerized transcription errors may be present    Vini BUCHANAN MD, am the primary provider of this record        Vini Pickard MD  08/22/22 2041       Vini Pickard MD  08/22/22 2042

## 2022-08-24 LAB — URINE CULTURE, ROUTINE: NORMAL

## 2022-08-25 ENCOUNTER — HOSPITAL ENCOUNTER (OUTPATIENT)
Dept: OTHER | Age: 62
Discharge: HOME | End: 2022-08-25

## 2022-08-26 ENCOUNTER — HOSPITAL ENCOUNTER (OUTPATIENT)
Dept: OTHER | Age: 62
Setting detail: THERAPIES SERIES
Discharge: HOME | End: 2022-08-26
Payer: MEDICARE

## 2022-08-26 VITALS
RESPIRATION RATE: 16 BRPM | WEIGHT: 237.8 LBS | SYSTOLIC BLOOD PRESSURE: 134 MMHG | DIASTOLIC BLOOD PRESSURE: 70 MMHG | OXYGEN SATURATION: 96 % | HEART RATE: 87 BPM | BODY MASS INDEX: 36.16 KG/M2

## 2022-08-26 LAB
ANION GAP SERPL CALCULATED.3IONS-SCNC: 11 MMOL/L (ref 7–16)
BUN BLDV-MCNC: 17 MG/DL (ref 6–23)
CALCIUM SERPL-MCNC: 9.8 MG/DL (ref 8.6–10.2)
CHLORIDE BLD-SCNC: 94 MMOL/L (ref 98–107)
CO2: 27 MMOL/L (ref 22–29)
CREAT SERPL-MCNC: 1 MG/DL (ref 0.7–1.2)
GFR AFRICAN AMERICAN: >60
GFR NON-AFRICAN AMERICAN: >60 ML/MIN/1.73
GLUCOSE BLD-MCNC: 269 MG/DL (ref 74–99)
POTASSIUM SERPL-SCNC: 3.9 MMOL/L (ref 3.5–5)
PRO-BNP: 243 PG/ML (ref 0–125)
SODIUM BLD-SCNC: 132 MMOL/L (ref 132–146)

## 2022-08-26 PROCEDURE — 83880 ASSAY OF NATRIURETIC PEPTIDE: CPT

## 2022-08-26 PROCEDURE — 99214 OFFICE O/P EST MOD 30 MIN: CPT

## 2022-08-26 PROCEDURE — 36415 COLL VENOUS BLD VENIPUNCTURE: CPT

## 2022-08-26 PROCEDURE — 80048 BASIC METABOLIC PNL TOTAL CA: CPT

## 2022-08-26 RX ORDER — CLOPIDOGREL BISULFATE 75 MG/1
75 TABLET ORAL DAILY
COMMUNITY

## 2022-08-26 NOTE — PROGRESS NOTES
[facility]est[facility] [place] [address] [facility]        Joann Page   1960          Referring Provider:   Primary Care Physician: Dr. Jackson [facility]  Cardiologist: Dr. Phani Momin  Nephrologist:        History of Present Illness:     Joann Page is a 58 y.o. male with a history of HFrEF, most recent EF 40% from 6/30/22    Patient Story:  He does not  have dyspnea with exertion, shortness of breath, or decline in overall functional capacity. He does not have orthopnea, PND, nocturnal cough or hemoptysis. He does not have abdominal distention or bloating, early satiety, anorexia/change in appetite. He does have a good urinary response to  oral diuretic. He does not have lower extremity edema. He denies lightheadedness, dizziness. He denies palpitations, syncope or near syncope. He does not complain of chest pain, pressure, discomfort. Allergies   Allergen Reactions    Adhesive Tape Rash           Prior to Visit Medications    Medication Sig Taking? Authorizing Provider   clopidogrel (PLAVIX) 75 MG tablet Take 75 mg by mouth daily Yes Historical Provider, MD   carvedilol (COREG) 12.5 MG tablet Take 1 tablet by mouth in the morning and 1 tablet before bedtime.   Patient taking differently: Take 6.25 mg by mouth 2 times daily Indications: medication reduced by Dr. Phani Momin due to patient stating he was having dizziness with 12.5 mg twice daily dose  Marcela Barboza MD   busPIRone (BUSPAR) 15 MG tablet 7.5 mg 2 times daily   Historical Provider, MD   apixaban (ELIQUIS) 5 MG TABS tablet Take 1 tablet by mouth 2 times daily  Marcela Barboza MD   sacubitril-valsartan (ENTRESTO) 24-26 MG per tablet Take 1 tablet by mouth 2 times daily  Velasquez Ervin MD   bumetanide (BUMEX) 1 MG tablet Take 1 tablet by mouth 2 times daily  Velasquez Ervin MD   spironolactone (ALDACTONE) 25 MG tablet Take 1 tablet by mouth daily  Patient not taking: Reported on 8/26/2022  Velasquez Ervin MD metFORMIN (GLUCOPHAGE-XR) 500 MG extended release tablet Take 2 tablets by mouth daily (with breakfast)  Patient taking differently: Take 500 mg by mouth 2 times daily  Maureen Haddad,    rosuvastatin (CRESTOR) 40 MG tablet Take 1 tablet by mouth nightly  Samanta Dodge DO   famotidine (PEPCID) 40 MG tablet Take 40 mg by mouth daily  Historical Provider, MD   tiZANidine (ZANAFLEX) 4 MG tablet Take 4 mg by mouth every 6 hours as needed  Historical Provider, MD   zolpidem (AMBIEN) 10 MG tablet Take by mouth nightly as needed for Sleep. Historical Provider, MD           Guideline directed medical:  ARNI/ACE I/ARB: Yes  Beta blocker: Yes  Aldosterone antagonist:  No- patient stopped taking on 08/22/22 due to photosensitivity side effects. Physical Examination:     /70   Pulse 87   Resp 16   Wt 237 lb 12.8 oz (107.9 kg)   SpO2 96%   BMI 36.16 kg/m²     Assessment  Charting Type: Shift assessment    Neurological  Level of Consciousness: Alert (0)              Respiratory  Respiratory Pattern: Regular  L Breath Sounds: Clear  R Breath Sounds: Clear              Cardiac  Cardiac Regularity: Regular  Heart Sounds: S1, S2  Cardiac Rhythm: Sinus rhythm    Rhythm Interpretation  Heart Rate: 87         Gastrointestinal  Abdominal (WDL): Within Defined Limits  RUQ Bowel Sounds: Active  LUQ Bowel Sounds: Active  RLQ Bowel Sounds: Active  LLQ Bowel Sounds: Active  Tenderness: Nontender          Bowel Sounds  RUQ Bowel Sounds: Active  LUQ Bowel Sounds: Active  RLQ Bowel Sounds: Active  LLQ Bowel Sounds:  Active    Peripheral Vascular  Edema: None                                                 Heart Rate: 87                     LAB DATA:    Last 3 BMP      Sodium (mmol/L)   Date Value   08/26/2022 132   08/22/2022 130 (L)   07/25/2022 130 (L)     Potassium (mmol/L)   Date Value   08/26/2022 3.9   07/25/2022 4.2   06/16/2022 4.2     Potassium reflex Magnesium (mmol/L)   Date Value   08/22/2022 3.8 04/29/2022 4.1   04/02/2022 4.3     Chloride (mmol/L)   Date Value   08/26/2022 94 (L)   08/22/2022 94 (L)   07/25/2022 96 (L)     CO2 (mmol/L)   Date Value   08/26/2022 27   08/22/2022 25   07/25/2022 20 (L)     BUN (mg/dL)   Date Value   08/26/2022 17   08/22/2022 17   08/03/2022 16     Glucose (mg/dL)   Date Value   08/26/2022 269 (H)   08/22/2022 257 (H)   07/25/2022 248 (H)   04/21/2011 78   04/17/2011 98   01/05/2011 129 (H)     Calcium (mg/dL)   Date Value   08/26/2022 9.8   08/22/2022 9.8   07/25/2022 9.2       Last 3 BNP       Pro-BNP (pg/mL)   Date Value   08/26/2022 243 (H)   08/22/2022 222 (H)   07/25/2022 168 (H)          CBC:   No results for input(s): WBC, HGB, PLT in the last 72 hours. BMP:    Recent Labs     08/26/22  1000      K 3.9   CL 94*   CO2 27   BUN 17   CREATININE 1.0   GLUCOSE 269*         Hepatic:   No results for input(s): AST, ALT, ALB, BILITOT, ALKPHOS in the last 72 hours. Troponin: No results for input(s): TROPONINI in the last 72 hours. BNP: No results for input(s): BNP in the last 72 hours. Lipids: No results for input(s): CHOL, HDL in the last 72 hours. Invalid input(s): LDLCALCU  INR: No results for input(s): INR in the last 72 hours. WEIGHTS:    Wt Readings from Last 3 Encounters:   08/26/22 237 lb 12.8 oz (107.9 kg)   08/25/22 233 lb (105.7 kg)   08/22/22 233 lb (105.7 kg)         TELEMETRY:  Cardiac Regularity: Regular  Cardiac Rhythm/Interpretation: NSR        ASSESSMENT:  Joann Page is euvolemic with stable weights. He was in the ER on August 22nd for photosensitivity stating \"the skin on his head was peeling off and he had intense burning in his stomach\" Patient believes this is due to taking his spironolactone, so he has since stopped taking it. His last dose of spironolactone was taken on August 22nd after his ER visit.  He states he has not noticed a difference in his urination since stopping the spironolactone, and he still voids around \"20 times a day\". Interventions completed this visit:  IV diuretics given no  Lab work obtained yes, BMP, BNP   Reviewed currently prescribed medications with patient, educated on importance of compliance and answered any questions regarding their medication  Educated on signs and symptoms of HF  Educated on low sodium diet    PLAN:  Future Appointments   Date Time Provider Tina Packer   9/26/2022  9:30 AM [facility] ROOM 1 [facility] HOD            Given clinic phone number and aware of signs and symptoms to call with any HF change in symptoms.

## 2022-08-26 NOTE — PLAN OF CARE
Problem: Chronic Conditions and Co-morbidities  Goal: Patient's chronic conditions and co-morbidity symptoms are monitored and maintained or improved  Outcome: Progressing   CHF- continue plan of care

## 2022-09-01 ENCOUNTER — HOSPITAL ENCOUNTER (OUTPATIENT)
Age: 62
Discharge: HOME | End: 2022-09-03
Payer: MEDICARE

## 2022-09-01 ENCOUNTER — HOSPITAL ENCOUNTER (OUTPATIENT)
Dept: GENERAL RADIOLOGY | Age: 62
Discharge: HOME | End: 2022-09-03
Payer: MEDICARE

## 2022-09-01 DIAGNOSIS — M25.532 LEFT WRIST PAIN: ICD-10-CM

## 2022-09-01 PROCEDURE — 73110 X-RAY EXAM OF WRIST: CPT

## 2022-09-01 NOTE — RESULT ENCOUNTER NOTE
Kidney function and electrolytes stable. Blood sugar high at 269. proBNP stable. Continue current treatment and follow-up as scheduled.

## 2022-09-02 ENCOUNTER — TELEPHONE (OUTPATIENT)
Dept: CARDIOLOGY CLINIC | Age: 62
End: 2022-09-02

## 2022-09-02 NOTE — TELEPHONE ENCOUNTER
Contacted patient with lab results and recommendations per Dr. Stephanie Baez. Patient verbalized understanding.    ----- Message from Lizeth Cortez MD sent at 9/1/2022 12:18 PM EDT -----  Kidney function and electrolytes stable. Blood sugar high at 269. proBNP stable. Continue current treatment and follow-up as scheduled.

## 2022-09-26 ENCOUNTER — HOSPITAL ENCOUNTER (OUTPATIENT)
Dept: OTHER | Age: 62
Setting detail: THERAPIES SERIES
Discharge: HOME | End: 2022-09-26
Payer: MEDICARE

## 2022-09-26 VITALS
DIASTOLIC BLOOD PRESSURE: 58 MMHG | WEIGHT: 241 LBS | OXYGEN SATURATION: 96 % | HEART RATE: 90 BPM | SYSTOLIC BLOOD PRESSURE: 109 MMHG | RESPIRATION RATE: 16 BRPM | BODY MASS INDEX: 36.64 KG/M2

## 2022-09-26 LAB
ANION GAP SERPL CALCULATED.3IONS-SCNC: 9 MMOL/L (ref 7–16)
BUN BLDV-MCNC: 12 MG/DL (ref 6–23)
CALCIUM SERPL-MCNC: 9.4 MG/DL (ref 8.6–10.2)
CHLORIDE BLD-SCNC: 100 MMOL/L (ref 98–107)
CO2: 27 MMOL/L (ref 22–29)
CREAT SERPL-MCNC: 0.8 MG/DL (ref 0.7–1.2)
GFR AFRICAN AMERICAN: >60
GFR NON-AFRICAN AMERICAN: >60 ML/MIN/1.73
GLUCOSE BLD-MCNC: 251 MG/DL (ref 74–99)
POTASSIUM SERPL-SCNC: 4.3 MMOL/L (ref 3.5–5)
PRO-BNP: 149 PG/ML (ref 0–125)
SODIUM BLD-SCNC: 136 MMOL/L (ref 132–146)

## 2022-09-26 PROCEDURE — 36415 COLL VENOUS BLD VENIPUNCTURE: CPT

## 2022-09-26 PROCEDURE — 99214 OFFICE O/P EST MOD 30 MIN: CPT

## 2022-09-26 PROCEDURE — 80048 BASIC METABOLIC PNL TOTAL CA: CPT

## 2022-09-26 PROCEDURE — 83880 ASSAY OF NATRIURETIC PEPTIDE: CPT

## 2022-09-26 RX ORDER — POLYETHYLENE GLYCOL 3350 17 G/17G
17 POWDER, FOR SOLUTION ORAL DAILY
COMMUNITY

## 2022-09-26 RX ORDER — LORATADINE 10 MG/1
10 CAPSULE, LIQUID FILLED ORAL DAILY
COMMUNITY

## 2022-09-26 ASSESSMENT — PAIN SCALES - GENERAL: PAINLEVEL_OUTOF10: 0

## 2022-09-26 NOTE — PROGRESS NOTES
Congestive Heart Failure [place] [address] [facility]        Rafael Hutchinson   1960          Referring Provider:   Primary Care Physician: Dr. Natalia Amos  Cardiologist: Dr. Elliot Mahmood  Nephrologist:        History of Present Illness:     Rafael Hutchinson is a 58 y.o. male with a history of HFrEF, most recent EF 40% from 6/30/22    Patient Story:  He does not  have dyspnea with exertion, shortness of breath, or decline in overall functional capacity. He does not have orthopnea, PND, nocturnal cough or hemoptysis. He does not have abdominal distention or bloating, early satiety, anorexia/change in appetite. He does have a good urinary response to  oral diuretic. He does not have lower extremity edema. He denies lightheadedness, dizziness. He denies palpitations, syncope or near syncope. He does not complain of chest pain, pressure, discomfort. Allergies   Allergen Reactions    Adhesive Tape Rash           Prior to Visit Medications    Medication Sig Taking? Authorizing Provider   Misc Natural Products (MENS PROSTATE HEALTH FORMULA PO) Take 1 tablet by mouth in the morning and at bedtime Yes Historical Provider, MD   Probiotic Product (HEALTHY COLON PO) Take 1 tablet by mouth daily Yes Historical Provider, MD   polyethylene glycol (GLYCOLAX) 17 GM/SCOOP powder Take 17 g by mouth daily Yes Historical Provider, MD   loratadine (CLARITIN) 10 MG capsule Take 10 mg by mouth daily Yes Historical Provider, MD   clopidogrel (PLAVIX) 75 MG tablet Take 75 mg by mouth daily  Historical Provider, MD   carvedilol (COREG) 12.5 MG tablet Take 1 tablet by mouth in the morning and 1 tablet before bedtime.   Patient taking differently: Take 12.5 mg by mouth 2 times daily (with meals) Indications: medication reduced by Dr. Elliot Mahmood due to patient stating he was having dizziness with 12.5 mg twice daily dose  Juan Batres MD   busPIRone (BUSPAR) 15 MG tablet 7.5 mg 2 times daily Historical Provider, MD   apixaban (ELIQUIS) 5 MG TABS tablet Take 1 tablet by mouth 2 times daily  Nimesh Nova MD   sacubitril-valsartan (ENTRESTO) 24-26 MG per tablet Take 1 tablet by mouth 2 times daily  Keith Law MD   bumetanide (BUMEX) 1 MG tablet Take 1 tablet by mouth 2 times daily  Keith Law MD   spironolactone (ALDACTONE) 25 MG tablet Take 1 tablet by mouth daily  Patient not taking: No sig reported  Keith Law MD   metFORMIN (GLUCOPHAGE-XR) 500 MG extended release tablet Take 2 tablets by mouth daily (with breakfast)  Patient taking differently: Take 500 mg by mouth 2 times daily  Lew Varela,    rosuvastatin (CRESTOR) 40 MG tablet Take 1 tablet by mouth nightly  Norris Dodge DO   famotidine (PEPCID) 40 MG tablet Take 40 mg by mouth daily  Historical Provider, MD   tiZANidine (ZANAFLEX) 4 MG tablet Take 4 mg by mouth at bedtime  Historical Provider, MD   zolpidem (AMBIEN) 10 MG tablet Take by mouth nightly as needed for Sleep. Historical Provider, MD           Guideline directed medical:  ARNI/ACE I/ARB: Yes  Beta blocker:   Yes  Aldosterone antagonist:  No- patient stopped taking on 08/22/22 due to photosensitivity side effects, states Dr Mike Thomas is aware        Physical Examination:     BP (!) 109/58   Pulse 90   Resp 16   Wt 241 lb (109.3 kg)   SpO2 96%   BMI 36.64 kg/m²     Assessment  Charting Type: Shift assessment (chf clinic)    Neurological  Level of Consciousness: Alert (0)              Respiratory  Respiratory Quality/Effort: Unlabored  Chest Assessment: Chest expansion symmetrical    Breath Sounds  Right Upper Lobe: Clear  Right Middle Lobe: Clear  Right Lower Lobe: Clear  Left Upper Lobe: Clear  Left Lower Lobe: Clear         Cardiac  Cardiac Rhythm: Sinus rhythm    Rhythm Interpretation  Heart Rate: 90         Gastrointestinal  Abdominal (WDL): Within Defined Limits  Abdomen Inspection: Rounded, Soft               Peripheral Vascular  Peripheral Vascular (WDL): Within Defined Limits  Edema: Right lower extremity, Left lower extremity  RLE Edema: None  LLE Edema: None                   Genitourinary  Genitourinary (WDL): Within Defined Limits    Psychosocial  Psychosocial (WDL): Within Defined Limits    Pain Assessment  Pain Assessment: 0-10  Pain Level: 0                   Heart Rate: 90                     LAB DATA:    Last 3 BMP      Sodium (mmol/L)   Date Value   09/26/2022 136   08/30/2022 135   08/26/2022 132     Potassium (mmol/L)   Date Value   09/26/2022 4.3   08/30/2022 4.4   08/26/2022 3.9     Potassium reflex Magnesium (mmol/L)   Date Value   08/22/2022 3.8   04/29/2022 4.1   04/02/2022 4.3     Chloride (mmol/L)   Date Value   09/26/2022 100   08/30/2022 99   08/26/2022 94 (L)     CO2 (mmol/L)   Date Value   09/26/2022 27   08/30/2022 27   08/26/2022 27     BUN (mg/dL)   Date Value   09/26/2022 12   08/30/2022 15   08/26/2022 17     Glucose (mg/dL)   Date Value   09/26/2022 251 (H)   08/30/2022 165 (H)   08/26/2022 269 (H)   04/21/2011 78   04/17/2011 98   01/05/2011 129 (H)     Calcium (mg/dL)   Date Value   09/26/2022 9.4   08/30/2022 9.4   08/26/2022 9.8       Last 3 BNP       Pro-BNP (pg/mL)   Date Value   09/26/2022 149 (H)   08/26/2022 243 (H)   08/22/2022 222 (H)          CBC:   No results for input(s): WBC, HGB, PLT in the last 72 hours. BMP:    Recent Labs     09/26/22  1014      K 4.3      CO2 27   BUN 12   CREATININE 0.8   GLUCOSE 251*           Hepatic:   No results for input(s): AST, ALT, ALB, BILITOT, ALKPHOS in the last 72 hours. Troponin: No results for input(s): TROPONINI in the last 72 hours. BNP: No results for input(s): BNP in the last 72 hours. Lipids: No results for input(s): CHOL, HDL in the last 72 hours. Invalid input(s): LDLCALCU  INR: No results for input(s): INR in the last 72 hours.         WEIGHTS:    Wt Readings from Last 3 Encounters:   09/26/22 241 lb (109.3 kg)   08/26/22 237 lb 12.8 oz (107.9 kg)   08/25/22 233 lb (105.7 kg)         TELEMETRY:  Cardiac Regularity: Regular  Cardiac Rhythm/Interpretation: NSR        ASSESSMENT:  Cachorro Arana weight is increased 4lbs since last CHF Clinic visit however patient does keep weight trend at home daily and states his weight are not increased at home. Patients lungs are clear ,abd is soft ,no lower leg edema. Patient states he has remained off Aldactone and Dr Liz Swanson is aware of ,he also states he is having prostate issues ant id doctoring with Dr Mike Kruger. Patient states he does hydrate with 64fld ounces    Interventions completed this visit:  IV diuretics given no  Lab work obtained yes, BMP, BNP   Reviewed currently prescribed medications with patient, educated on importance of compliance and answered any questions regarding their medication  Educated on signs and symptoms of HF  Educated on low sodium diet    PLAN:  Future Appointments   Date Time Provider Tina Packer   10/26/2022  9:30 AM [facility]              Given clinic phone number and aware of signs and symptoms to call with any HF change in symptoms.

## 2022-10-26 ENCOUNTER — HOSPITAL ENCOUNTER (OUTPATIENT)
Dept: OTHER | Age: 62
Discharge: HOME | End: 2022-10-26

## 2022-10-26 ENCOUNTER — TELEPHONE (OUTPATIENT)
Dept: CARDIOLOGY CLINIC | Age: 62
End: 2022-10-26

## 2022-10-26 NOTE — TELEPHONE ENCOUNTER
Contacted patient with lab results and recommendations per Dr. Essence Carreon. Patient verbalized understanding. Patient requested Entresto 24-26 samples.   He was given:    2 bottles with 28 tabs each  Lot [id number]  Exp 12/2024    ----- Message from Oren Logan MD sent at 10/26/2022  1:01 PM EDT -----  Blood work stable continue same, follow-up as scheduled

## 2022-11-02 ENCOUNTER — HOSPITAL ENCOUNTER (OUTPATIENT)
Dept: OTHER | Age: 62
Setting detail: THERAPIES SERIES
Discharge: HOME | End: 2022-11-02
Payer: MEDICARE

## 2022-11-02 VITALS
OXYGEN SATURATION: 97 % | SYSTOLIC BLOOD PRESSURE: 109 MMHG | RESPIRATION RATE: 16 BRPM | BODY MASS INDEX: 37.46 KG/M2 | WEIGHT: 246.38 LBS | DIASTOLIC BLOOD PRESSURE: 57 MMHG | HEART RATE: 76 BPM

## 2022-11-02 LAB
ANION GAP SERPL CALCULATED.3IONS-SCNC: 10 MMOL/L (ref 7–16)
BUN BLDV-MCNC: 13 MG/DL (ref 6–23)
CALCIUM SERPL-MCNC: 9.5 MG/DL (ref 8.6–10.2)
CHLORIDE BLD-SCNC: 97 MMOL/L (ref 98–107)
CO2: 26 MMOL/L (ref 22–29)
CREAT SERPL-MCNC: 0.8 MG/DL (ref 0.7–1.2)
GFR SERPL CREATININE-BSD FRML MDRD: >60 ML/MIN/1.73
GLUCOSE BLD-MCNC: 290 MG/DL (ref 74–99)
POTASSIUM SERPL-SCNC: 4.1 MMOL/L (ref 3.5–5)
PRO-BNP: 148 PG/ML (ref 0–125)
SODIUM BLD-SCNC: 133 MMOL/L (ref 132–146)

## 2022-11-02 PROCEDURE — 36415 COLL VENOUS BLD VENIPUNCTURE: CPT

## 2022-11-02 PROCEDURE — 80048 BASIC METABOLIC PNL TOTAL CA: CPT

## 2022-11-02 PROCEDURE — 83880 ASSAY OF NATRIURETIC PEPTIDE: CPT

## 2022-11-02 PROCEDURE — 99214 OFFICE O/P EST MOD 30 MIN: CPT

## 2022-11-02 NOTE — DISCHARGE INSTRUCTIONS
Learning About Self-Care for Heart Failure  What is self-care for heart failure? Heart failure usually gets worse over time. But there are many things you can do to feel better, avoid the hospital, and live longer. Self-care means managing your health by doing certain things every day, like weighing yourself. It's about knowing which symptoms to watch for so you can avoid getting worse. When you practice good self-care, you know when it's time to call your doctor and when your heart failure has turned into an emergency. The list below can help. Top 5 self-care tips for every day   Take your medicines as prescribed. This gives them the best chance of helping you. Weigh yourself every day. This helps you watch for signs that you're getting worse. Weight gain may be a sign that your body is holding on to too much fluid. Weigh yourself at the same time each day, using the same scale, on a hard, flat surface. The best time is in the morning after you go to the bathroom and before you eat or drink anything. Keep a daily record of your symptoms. Checking your symptoms helps you see what symptoms are normal for you and if they change or get worse. Limit sodium. This helps keep fluid from building up and may help you feel better. Your doctor can tell you how much sodium is right for you. An example is less than 3,000 milligrams (mg) a day. Try limiting the salt you eat at home, and by watching for \"hidden\" sodium when you eat out or shop for food. Try to exercise throughout the week. Exercise makes your heart stronger and can help you avoid symptoms. Walking is a great way to get exercise. If your doctor says it's safe, start out with some short walks. Then slowly build up to longer ones. Some people with heart failure also may need to limit how much fluid they drink each day. Ask your doctor if this is true for you and, if it is, how much fluid is safe for you to drink each day. When should you act?   Try to become familiar with signs that mean your heart failure is getting worse. If you need help, talk with your doctor about making a personal plan. Here are some things to watch for as you practice your daily self-care. Call your doctor if:  You have sudden weight gain, such as more than 2 to 3 pounds in a day or 5 pounds in a week. (Your doctor may suggest a different range of weight gain.)  You have new or worse swelling in your feet, ankles, or legs. Your breathing gets worse. Activities that did not make you short of breath before are hard for you now. Your breathing when you lie down is worse than usual, or you wake up at night needing to catch your breath. Be sure to make and go to all of your follow-up appointments. And it's always a good idea to call your doctor anytime you have a sudden change in symptoms. When is it an emergency? Sometimes the symptoms get worse very quickly. This is called sudden heart failure. It causes fluid to build up in your lungs. Sudden heart failure is an emergency. If you have any of these symptoms, you need care right away. Call 911 if:   You have severe shortness of breath. You have an irregular or fast heartbeat. You cough up foamy, pink mucus. What else can you do to stay healthy? There are other things you can do to take care of your body and your heart. These things will help you feel better. And they will also reduce your risk of heart attack and stroke. Try to stay at a healthy weight. Eat a healthy diet with lots of fresh fruit, vegetables, and whole grains. If you smoke, quit. Limit the amount of alcohol you drink. Keep high blood pressure and diabetes under control. If you need help, talk with your doctor. If your doctor has not set you up with a cardiac rehabilitation (rehab) program, talk to him or her about whether that is right for you. Cardiac rehab includes exercise, help with diet and lifestyle changes, and emotional support.   Also let your doctor know if:  You're having trouble sleeping. Sleep is important to your well-being. It also helps your heart work the way it's supposed to. Your doctor can help you decide if you need treatment for sleep problems. You're feeling sad or hopeless much of the time, or you are worried and anxious. Heart failure can be hard on your emotions. Treatment with counseling and medicine can help. And when you feel better, you're more likely to take care of yourself. You think you may have a problem with alcohol or drug use. You and your doctor can decide if you have a problem and what type of treatment might help you. Follow-up care is a key part of your treatment and safety. Be sure to make and go to all appointments, and call your doctor if you are having problems. It's also a good idea to know your test results and keep a list of the medicines you take. Where can you learn more? Go to [email or web address] org and sign in to your [employer] account. Enter R389 in the [employer] box to learn more about \"Learning About Self-Care for Heart Failure. \"     If you do not have an account, please click on the \"Sign Up Now\" link. Current as of: January 10, 2022               Content Version: 13.4  © 2006-2022 [employer]wise, Incorporated. Care instructions adapted under license by [facility] ([facility]). If you have questions about a medical condition or this instruction, always ask your healthcare professional. [place] [address] any warranty or liability for your use of this information.

## 2022-11-02 NOTE — PROGRESS NOTES
Congestive Heart Failure [place] [address] [facility]        Katerin Keys   1960          Referring Provider:   Primary Care Physician: Dr. Massie Bumpers  Cardiologist: Dr. Bhargavi Nazario  Nephrologist:        History of Present Illness:     Katerin Keys is a 58 y.o. male with a history of HFrEF, most recent EF 40% from 6/30/22. Patient Story:  He does not  have dyspnea with exertion, shortness of breath, or decline in overall functional capacity. He does not have orthopnea, PND, nocturnal cough or hemoptysis. He does not have abdominal distention or bloating, early satiety, anorexia/change in appetite. He does have a good urinary response to the oral diuretic. He does not have lower extremity edema. He denies lightheadedness, dizziness. He denies palpitations, syncope or near syncope. He does not complain of chest pain, pressure, discomfort. Allergies   Allergen Reactions    Adhesive Tape Rash           Prior to Visit Medications    Medication Sig Taking? Authorizing Provider   Misc Natural Products (PUMPKIN SEED OIL PO) Take 1,000 mLs by mouth daily Yes Historical Provider, MD   Misc Natural Products ([address] PO) Take 1 tablet by mouth in the morning and at bedtime  Historical Provider, MD   Probiotic Product (HEALTHY COLON PO) Take 1 tablet by mouth daily  Historical Provider, MD   polyethylene glycol (GLYCOLAX) 17 GM/SCOOP powder Take 17 g by mouth daily  Historical Provider, MD   loratadine (CLARITIN) 10 MG capsule Take 10 mg by mouth daily  Historical Provider, MD   clopidogrel (PLAVIX) 75 MG tablet Take 75 mg by mouth daily  Historical Provider, MD   carvedilol (COREG) 12.5 MG tablet Take 1 tablet by mouth in the morning and 1 tablet before bedtime.   Patient taking differently: Take 12.5 mg by mouth 2 times daily (with meals) Indications: medication reduced by Dr. Burk [place] due to patient stating he was having dizziness with 12.5 mg twice daily dose  Daniel Ocasio MD   busPIRone (BUSPAR) 15 MG tablet 7.5 mg 2 times daily   Historical Provider, MD   apixaban (ELIQUIS) 5 MG TABS tablet Take 1 tablet by mouth 2 times daily  Daniel Ocasio MD   sacubitril-valsartan (ENTRESTO) 24-26 MG per tablet Take 1 tablet by mouth 2 times daily  Toni Carbajal MD   bumetanide (BUMEX) 1 MG tablet Take 1 tablet by mouth 2 times daily  Toni Carbajal MD   metFORMIN (GLUCOPHAGE-XR) 500 MG extended release tablet Take 2 tablets by mouth daily (with breakfast)  Patient taking differently: Take 500 mg by mouth 2 times daily  [address], DO   rosuvastatin (CRESTOR) 40 MG tablet Take 1 tablet by mouth nightly  [facility], DO   famotidine (PEPCID) 40 MG tablet Take 40 mg by mouth daily  Historical Provider, MD   tiZANidine (ZANAFLEX) 4 MG tablet Take 4 mg by mouth at bedtime  Historical Provider, MD   zolpidem (AMBIEN) 10 MG tablet Take by mouth nightly as needed for Sleep. Historical Provider, MD           Guideline directed medical:  ARNI/ACE I/ARB: Yes  Beta blocker: Yes  Aldosterone antagonist:  No- patient stopped taking on 08/22/22 due to photosensitivity side effects, states Dr Tae Dumont is aware.         Physical Examination:     BP (!) 109/57   Pulse 76   Resp 16   Wt 246 lb 6 oz (111.8 kg)   SpO2 97%   BMI 37.46 kg/m²     Assessment  Charting Type: Shift assessment    Neurological  Level of Consciousness: Alert (0)              Respiratory  Respiratory Pattern: Regular  Respiratory Depth: Normal  Respiratory Quality/Effort: Unlabored  Chest Assessment: Chest expansion symmetrical  L Breath Sounds: Clear  R Breath Sounds: Clear  Level of Activity/Mobility: Ambulatory    Breath Sounds  Right Upper Lobe: Clear  Right Middle Lobe: Clear  Right Lower Lobe: Clear  Left Upper Lobe: Clear  Left Lower Lobe: Clear         Cardiac  Cardiac Regularity: Regular  Cardiac Rhythm: Sinus rhythm    Rhythm Interpretation  Heart Rate: 76 Gastrointestinal  Abdominal (WDL): Within Defined Limits  Abdomen Inspection: Rounded, Soft  RUQ Bowel Sounds: Active  LUQ Bowel Sounds: Active  RLQ Bowel Sounds: Active  LLQ Bowel Sounds: Active          Bowel Sounds  RUQ Bowel Sounds: Active  LUQ Bowel Sounds: Active  RLQ Bowel Sounds: Active  LLQ Bowel Sounds: Active    Peripheral Vascular  Peripheral Vascular (WDL): Within Defined Limits  Edema: Right lower extremity, Left lower extremity  RLE Edema: None  LLE Edema: None                   Genitourinary  Genitourinary (WDL): Within Defined Limits    Psychosocial  Psychosocial (WDL): Within Defined Limits    Pain Assessment  Pain Assessment: None - Denies Pain                   Heart Rate: 76                     LAB DATA:    Last 3 BMP      Sodium (mmol/L)   Date Value   11/02/2022 133   09/26/2022 136   08/30/2022 135     Potassium (mmol/L)   Date Value   11/02/2022 4.1   09/26/2022 4.3   08/30/2022 4.4     Potassium reflex Magnesium (mmol/L)   Date Value   08/22/2022 3.8   04/29/2022 4.1   04/02/2022 4.3     Chloride (mmol/L)   Date Value   11/02/2022 97 (L)   09/26/2022 100   08/30/2022 99     CO2 (mmol/L)   Date Value   11/02/2022 26   09/26/2022 27   08/30/2022 27     BUN (mg/dL)   Date Value   11/02/2022 13   09/26/2022 12   08/30/2022 15     Glucose (mg/dL)   Date Value   11/02/2022 290 (H)   09/26/2022 251 (H)   08/30/2022 165 (H)   04/21/2011 78   04/17/2011 98   01/05/2011 129 (H)     Calcium (mg/dL)   Date Value   11/02/2022 9.5   09/26/2022 9.4   08/30/2022 9.4       Last 3 BNP       Pro-BNP (pg/mL)   Date Value   11/02/2022 148 (H)   09/26/2022 149 (H)   08/26/2022 243 (H)          CBC:   No results for input(s): WBC, HGB, PLT in the last 72 hours. BMP:    Recent Labs     11/02/22  0957      K 4.1   CL 97*   CO2 26   BUN 13   CREATININE 0.8   GLUCOSE 290*             Hepatic:   No results for input(s): AST, ALT, ALB, BILITOT, ALKPHOS in the last 72 hours.   Troponin: No results for input(s): TROPONINI in the last 72 hours. BNP: No results for input(s): BNP in the last 72 hours. Lipids: No results for input(s): CHOL, HDL in the last 72 hours. Invalid input(s): LDLCALCU  INR: No results for input(s): INR in the last 72 hours. WEIGHTS:    Wt Readings from Last 3 Encounters:   11/02/22 246 lb 6 oz (111.8 kg)   09/26/22 241 lb (109.3 kg)   08/26/22 237 lb 12.8 oz (107.9 kg)         TELEMETRY:  Cardiac Regularity: Regular  Cardiac Rhythm/Interpretation: NSR        ASSESSMENT:  Jonathan Hart weight is increased 5.5lbs since last CHF Clinic visit. Patient states he has been eating more but watching his low sodium diet. Patient states he does hydrate with 64fld ounces. Patient states that he does keep weight trend at home daily. Patients lungs are clear ,abd is soft ,no lower leg edema. Interventions completed this visit:  IV diuretics given no  Lab work obtained yes, BMP, BNP   Reviewed currently prescribed medications with patient, educated on importance of compliance and answered any questions regarding their medication  Educated on signs and symptoms of HF  Educated on low sodium diet    PLAN:  Future Appointments   Date Time Provider Tina Packer   12/7/2022  9:30 AM [facility] ROOM 1 [facility] ARMSTR[facility]              Given clinic phone number [phone] and aware of signs and symptoms to call with any HF change in symptoms.

## 2022-11-04 ENCOUNTER — TELEPHONE (OUTPATIENT)
Dept: CARDIOLOGY CLINIC | Age: 62
End: 2022-11-04

## 2022-11-04 NOTE — TELEPHONE ENCOUNTER
Left message for patient to contact office.     ----- Message from Alannah Elizondo MD sent at 11/3/2022  6:00 PM EDT -----  Blood work stable continue same follow-up as scheduled

## 2022-12-07 ENCOUNTER — HOSPITAL ENCOUNTER (OUTPATIENT)
Dept: OTHER | Age: 62
Setting detail: THERAPIES SERIES
Discharge: HOME | End: 2022-12-07
Payer: MEDICARE

## 2022-12-07 VITALS
HEART RATE: 86 BPM | WEIGHT: 246 LBS | SYSTOLIC BLOOD PRESSURE: 120 MMHG | RESPIRATION RATE: 18 BRPM | DIASTOLIC BLOOD PRESSURE: 65 MMHG | BODY MASS INDEX: 37.4 KG/M2 | OXYGEN SATURATION: 95 %

## 2022-12-07 DIAGNOSIS — I50.20 HFREF (HEART FAILURE WITH REDUCED EJECTION FRACTION) (HCC): Primary | ICD-10-CM

## 2022-12-07 LAB
ANION GAP SERPL CALCULATED.3IONS-SCNC: 11 MMOL/L (ref 7–16)
BUN BLDV-MCNC: 15 MG/DL (ref 6–23)
CALCIUM SERPL-MCNC: 9.3 MG/DL (ref 8.6–10.2)
CHLORIDE BLD-SCNC: 94 MMOL/L (ref 98–107)
CO2: 25 MMOL/L (ref 22–29)
CREAT SERPL-MCNC: 0.8 MG/DL (ref 0.7–1.2)
GFR SERPL CREATININE-BSD FRML MDRD: >60 ML/MIN/1.73
GLUCOSE BLD-MCNC: 321 MG/DL (ref 74–99)
POTASSIUM SERPL-SCNC: 4.2 MMOL/L (ref 3.5–5)
PRO-BNP: 120 PG/ML (ref 0–125)
SODIUM BLD-SCNC: 130 MMOL/L (ref 132–146)

## 2022-12-07 PROCEDURE — 99214 OFFICE O/P EST MOD 30 MIN: CPT

## 2022-12-07 PROCEDURE — 83880 ASSAY OF NATRIURETIC PEPTIDE: CPT

## 2022-12-07 PROCEDURE — 36415 COLL VENOUS BLD VENIPUNCTURE: CPT

## 2022-12-07 PROCEDURE — 80048 BASIC METABOLIC PNL TOTAL CA: CPT

## 2022-12-07 NOTE — RESULT ENCOUNTER NOTE
Labs and CHF clinic note   Vitals: 120/65, 86    Continue GDMT:  Coreg 12.5 mg BID  Entresto 24/26 mg BID  Bumex 1 mg BID    Spoke with Mayte MONTEMAYOR  Reduce Bumex to once daily   Start Jardiance 10 mg daily Elpidio Stein I sent script - please ensure prior Kerry Ally is not needed)  Follow up labs in 2 weeks  Follow up in 1 month in CHF clinic - soon if needed    Thank you

## 2022-12-07 NOTE — PROGRESS NOTES
Left message with patient to take 1 mg of Bumex daily and to start taking Jardiance 10 mg daily and to get blood work in two weeks per Walter Kelly's CNP's order.

## 2022-12-07 NOTE — PROGRESS NOTES
Congestive Heart Failure [place] [address] [facility]        Jonathan Hart   1960          Referring Provider:   Primary Care Physician: Dr. Lindsay Barahona  Cardiologist: Dr. Eloy Powers  Nephrologist:        History of Present Illness:     Jonathan Hart is a 58 y.o. male with a history of HFrEF, most recent EF 40% from 6/30/22. Patient Story:  He does not  have dyspnea with exertion, shortness of breath, or decline in overall functional capacity. He does not have orthopnea, PND, nocturnal cough or hemoptysis. He does not have abdominal distention or bloating, early satiety, anorexia/change in appetite. He does have a good urinary response to the oral diuretic. He does not have lower extremity edema. He denies lightheadedness, dizziness. He denies palpitations, syncope or near syncope. He does not complain of chest pain, pressure, discomfort. Allergies   Allergen Reactions    Adhesive Tape Rash           Prior to Visit Medications    Medication Sig Taking? Authorizing Provider   Misc Natural Products (PUMPKIN SEED OIL PO) Take 1,000 mLs by mouth daily  Historical Provider, MD   Misc Natural Products ([address]) Take 1 tablet by mouth in the morning and at bedtime  Historical Provider, MD   Probiotic Product (HEALTHY COLON PO) Take 1 tablet by mouth daily  Historical Provider, MD   polyethylene glycol (GLYCOLAX) 17 GM/SCOOP powder Take 17 g by mouth daily  Historical Provider, MD   loratadine (CLARITIN) 10 MG capsule Take 10 mg by mouth daily  Historical Provider, MD   clopidogrel (PLAVIX) 75 MG tablet Take 75 mg by mouth daily  Historical Provider, MD   carvedilol (COREG) 12.5 MG tablet Take 1 tablet by mouth in the morning and 1 tablet before bedtime.   Patient taking differently: Take 12.5 mg by mouth 2 times daily (with meals) Indications: medication reduced by Dr. Eloy Powers due to patient stating he was having dizziness with 12.5 mg twice daily dose  Lul Goldman MD   busPIRone (BUSPAR) 15 MG tablet 7.5 mg 2 times daily   Historical Provider, MD   apixaban (ELIQUIS) 5 MG TABS tablet Take 1 tablet by mouth 2 times daily  Lul Goldman MD   sacubitril-valsartan (ENTRESTO) 24-26 MG per tablet Take 1 tablet by mouth 2 times daily  Naresh Muñoz MD   bumetanide (BUMEX) 1 MG tablet Take 1 tablet by mouth 2 times daily  Naresh Muñoz MD   metFORMIN (GLUCOPHAGE-XR) 500 MG extended release tablet Take 2 tablets by mouth daily (with breakfast)  Patient taking differently: Take 500 mg by mouth 2 times daily  Ángela Kumar, DO   rosuvastatin (CRESTOR) 40 MG tablet Take 1 tablet by mouth nightly  Kris Dodge, DO   famotidine (PEPCID) 40 MG tablet Take 40 mg by mouth daily  Historical Provider, MD   tiZANidine (ZANAFLEX) 4 MG tablet Take 4 mg by mouth at bedtime  Historical Provider, MD   zolpidem (AMBIEN) 10 MG tablet Take by mouth nightly as needed for Sleep. Historical Provider, MD           Guideline directed medical:  ARNI/ACE I/ARB: Yes  Beta blocker: Yes  Aldosterone antagonist:  No- patient stopped taking on 08/22/22 due to photosensitivity side effects, states Dr Teresa Hills is aware.         Physical Examination:     /65   Pulse 86   Resp 18   Wt 246 lb (111.6 kg)   SpO2 95%   BMI 37.40 kg/m²     Assessment  Charting Type: Shift assessment    Neurological  Level of Consciousness: Alert (0)              Respiratory  Respiratory Pattern: Regular  Respiratory Depth: Normal  Respiratory Quality/Effort: Unlabored  Chest Assessment: Chest expansion symmetrical  L Breath Sounds: Clear  R Breath Sounds: Clear    Breath Sounds  Right Upper Lobe: Clear  Right Middle Lobe: Clear  Right Lower Lobe: Clear  Left Upper Lobe: Clear  Left Lower Lobe: Clear         Cardiac  Cardiac Regularity: Regular  Cardiac Rhythm: Sinus rhythm    Rhythm Interpretation  Heart Rate: 86         Gastrointestinal  Abdominal (WDL): Within Defined Limits  Abdomen Inspection: Rounded, Soft  RUQ Bowel Sounds: Active  LUQ Bowel Sounds: Active  RLQ Bowel Sounds: Active  LLQ Bowel Sounds: Active          Bowel Sounds  RUQ Bowel Sounds: Active  LUQ Bowel Sounds: Active  RLQ Bowel Sounds: Active  LLQ Bowel Sounds: Active    Peripheral Vascular  Peripheral Vascular (WDL): Within Defined Limits  Edema: Right lower extremity, Left lower extremity  RLE Edema: None  LLE Edema: None                   Genitourinary  Genitourinary (WDL): Within Defined Limits    Psychosocial  Psychosocial (WDL): Within Defined Limits                        Heart Rate: 86                     LAB DATA:    Last 3 BMP      Sodium (mmol/L)   Date Value   12/07/2022 130 (L)   11/02/2022 133   09/26/2022 136     Potassium (mmol/L)   Date Value   12/07/2022 4.2   11/02/2022 4.1   09/26/2022 4.3     Potassium reflex Magnesium (mmol/L)   Date Value   08/22/2022 3.8   04/29/2022 4.1   04/02/2022 4.3     Chloride (mmol/L)   Date Value   12/07/2022 94 (L)   11/02/2022 97 (L)   09/26/2022 100     CO2 (mmol/L)   Date Value   12/07/2022 25   11/02/2022 26   09/26/2022 27     BUN (mg/dL)   Date Value   12/07/2022 15   11/02/2022 13   09/26/2022 12     Glucose (mg/dL)   Date Value   12/07/2022 321 (H)   11/02/2022 290 (H)   09/26/2022 251 (H)   04/21/2011 78   04/17/2011 98   01/05/2011 129 (H)     Calcium (mg/dL)   Date Value   12/07/2022 9.3   11/02/2022 9.5   09/26/2022 9.4       Last 3 BNP       Pro-BNP (pg/mL)   Date Value   12/07/2022 120   11/02/2022 148 (H)   09/26/2022 149 (H)          CBC:   No results for input(s): WBC, HGB, PLT in the last 72 hours. BMP:    Recent Labs     12/07/22  1006   *   K 4.2   CL 94*   CO2 25   BUN 15   CREATININE 0.8   GLUCOSE 321*             Hepatic:   No results for input(s): AST, ALT, ALB, BILITOT, ALKPHOS in the last 72 hours. Troponin: No results for input(s): TROPONINI in the last 72 hours. BNP: No results for input(s): BNP in the last 72 hours.   Lipids: No results for input(s): CHOL, HDL in the last 72 hours. Invalid input(s): LDLCALCU  INR: No results for input(s): INR in the last 72 hours. WEIGHTS:    Wt Readings from Last 3 Encounters:   12/07/22 246 lb (111.6 kg)   11/02/22 246 lb 6 oz (111.8 kg)   09/26/22 241 lb (109.3 kg)         TELEMETRY:  Cardiac Regularity: Regular  Cardiac Rhythm/Interpretation: NSR        ASSESSMENT:  Briana Bernabe weight is unchanged since last CHF Clinic visit. Upon assessment patient lungs are clear to auscultation, abdomen soft, no lower extremity edema noted. Patient instructed to keep following a low sodium diet and to hydrated with 64 ounces of fluid daily. Interventions completed this visit:  IV diuretics given no  Lab work obtained yes, BMP, BNP   Reviewed currently prescribed medications with patient, educated on importance of compliance and answered any questions regarding their medication  Educated on signs and symptoms of HF  Educated on low sodium diet    PLAN:  Future Appointments   Date Time Provider Tina Packer   1/11/2023  9:30 AM [facility] ROOM 1 [facility]              Given clinic phone number [phone] and aware of signs and symptoms to call with any HF change in symptoms.

## 2022-12-08 ENCOUNTER — TELEPHONE (OUTPATIENT)
Dept: CARDIOLOGY CLINIC | Age: 62
End: 2022-12-08

## 2022-12-08 ENCOUNTER — TELEPHONE (OUTPATIENT)
Dept: OTHER | Age: 62
End: 2022-12-08

## 2022-12-08 NOTE — TELEPHONE ENCOUNTER
Patient of Dr Rankin Scheuermann, follows at [address]. He was provided rx assistance # to [place] for help, he did not f/u (see Dr Rankin Scheuermann notes 8-9-22)    [facility]ed [facility]. Per April,   Medicare part d. Not eligible for copay card. 155.05  (in gap coverage) Jardiance. April 2022 was last time eliquis was filled  Apirl 2022 last time entresto was filled. Is due for coreg refill now. Next called [facility]: never filled at [facility]: Entresto, Aldactone and bumex never filled at [facility]. Script is there. Left VM for patient to call our office with update where he is filling bumex/bumetanide, aldactone / spironolactone, eliquis and Entresto through. Please call and we can review Jardiance 10mg daily as a HF medication indication  as it is not  being prescribed for diabetes. Also provided [phone] which is rx assistance [facility] who can help with application completions for name brand drugs. Also left in message to review as it is open enrollment for medicare his benefit package choices he may need to pick a different  insurance plan coverage if he cannot afford his gap in care coverage. Pending call back from patient. Called [facility] surjit [phone]  and left VM  please clarify if med rec was done I while at clinic and f patient is taking any of the above meds. Do they know where he is getting the scripts through? Any insight  would be helpful. Pending call back or note update.

## 2022-12-08 NOTE — TELEPHONE ENCOUNTER
Pt returned Savi MUNOZ call. He confirmed he is taking all medications except Jadiance due to costing $150. He left a message with [facility]cription [facility]. He uses [facility]. Referred to Savi MUNOZ. Pt still has concerns with taking the Jardiance.

## 2022-12-08 NOTE — TELEPHONE ENCOUNTER
Contacted patient. He has been scheduled to be seen in our office on 12/9/2022 to discuss his medications, where he is filling them, etc. at his request.    Dr. Mj Ayala has asked that any and all medication adjustments be handled through our office in the future due to difficulty managing patient.

## 2022-12-08 NOTE — TELEPHONE ENCOUNTER
Patient called again today asking why he is needing to start [address], he feels great. After explaining the importance of taking he doesn't really want to start another medication Says he can't afford the Jardiance because he's in the insurance gap and wants to wait to see Dr. Azalea Stewart before starting this med. Referred to Nehal CHAND Please advise.

## 2022-12-08 NOTE — TELEPHONE ENCOUNTER
As there is another already started encounter regarding his jardiance (please refer to that encounter) where medication questions will be answered tomorrow at Dr Jessica Dean visit      Per [facility] with Dr Yara Pimentel: \"Contacted patient. He has been scheduled to be seen in our office on 12/9/2022 to discuss his medications, where he is filling them, etc. at his request.     Dr. Yara Pimentel has asked that any and all medication adjustments be handled through our office in the future due to difficulty managing patient. \"   Future Appointments   Date Time Provider Tina Packer   12/9/2022  3:00 PM Mike Huizar MD [facility]   1/11/2023  9:30 AM [facility] ROOM 1 [facility]

## 2022-12-08 NOTE — TELEPHONE ENCOUNTER
Patient called, gave him instructions to reduce Bumex to once daily. Says he can not afford and won't take Jardiance b/c it for diabetes. Referred to Nehal CHAND For further instruction after explaining Jardiances use with a HF patient.

## 2022-12-09 ENCOUNTER — OFFICE VISIT (OUTPATIENT)
Dept: CARDIOLOGY CLINIC | Age: 62
End: 2022-12-09
Payer: MEDICARE

## 2022-12-09 VITALS
SYSTOLIC BLOOD PRESSURE: 124 MMHG | BODY MASS INDEX: 37.27 KG/M2 | RESPIRATION RATE: 18 BRPM | WEIGHT: 245.9 LBS | HEIGHT: 68 IN | DIASTOLIC BLOOD PRESSURE: 80 MMHG | HEART RATE: 76 BPM

## 2022-12-09 DIAGNOSIS — I50.20 HFREF (HEART FAILURE WITH REDUCED EJECTION FRACTION) (HCC): ICD-10-CM

## 2022-12-09 DIAGNOSIS — I25.5 ISCHEMIC CARDIOMYOPATHY: ICD-10-CM

## 2022-12-09 DIAGNOSIS — I25.10 CAD IN NATIVE ARTERY: Primary | ICD-10-CM

## 2022-12-09 LAB
ANION GAP SERPL CALCULATED.3IONS-SCNC: 16 MMOL/L (ref 7–16)
BUN BLDV-MCNC: 18 MG/DL (ref 6–23)
CALCIUM SERPL-MCNC: 9.8 MG/DL (ref 8.6–10.2)
CHLORIDE BLD-SCNC: 98 MMOL/L (ref 98–107)
CO2: 24 MMOL/L (ref 22–29)
CREAT SERPL-MCNC: 0.8 MG/DL (ref 0.7–1.2)
GFR SERPL CREATININE-BSD FRML MDRD: >60 ML/MIN/1.73
GLUCOSE BLD-MCNC: 238 MG/DL (ref 74–99)
POTASSIUM SERPL-SCNC: 4.6 MMOL/L (ref 3.5–5)
PRO-BNP: 129 PG/ML (ref 0–125)
SODIUM BLD-SCNC: 138 MMOL/L (ref 132–146)

## 2022-12-09 PROCEDURE — 99214 OFFICE O/P EST MOD 30 MIN: CPT | Performed by: INTERNAL MEDICINE

## 2022-12-09 PROCEDURE — 93000 ELECTROCARDIOGRAM COMPLETE: CPT | Performed by: INTERNAL MEDICINE

## 2022-12-09 RX ORDER — BUMETANIDE 1 MG/1
1 TABLET ORAL DAILY
Qty: 30 TABLET | Refills: 3 | Status: SHIPPED | OUTPATIENT
Start: 2022-12-09

## 2022-12-09 NOTE — PROGRESS NOTES
Patient presented today and was given Eliquis 5 mg . The medication is being monitored by Dr. Kaleigh Ponce. Sample drug name: Eliquis 5mg  Sample drug lot #: [id number]  Sample drug expiration date: 09/2024  How many sample boxes and/or bottles given: 4 boxes (56 tablets)     Patient presented today and was given Entresto 24-26 mg. The medication is being monitored by Dr. Kaleigh Ponce.    Sample drug name: [place] 24-26 mg  Sample drug lot #: [id number]  Sample drug expiration date: 02/2025  How many sample boxes and/or bottles given: 4 Bottles (112 tablets)

## 2022-12-09 NOTE — PROGRESS NOTES
OUTPATIENT CARDIOLOGY FOLLOW-UP    Name: Luisa Huber    Age: 58 y.o. Primary Care Physician: July Hernandez MD    Date of Service: 12/9/2022    Chief Complaint:   Chief Complaint   Patient presents with    Follow-up        Interim History:   Here for follow-up of cardiomyopathy. Seen by me as new patient 3/10/2022 office visit for abnormal stress test showing fixed inferior defect with EF in the 20s, and echo showing an EF in the 35s. Heart catheterization 3/16/2022 subsequently revealed multivessel coronary disease and ended up getting stents to the mid LAD, OM 2, and RCA. He was admitted again 3/31/2022 for chest pain and shortness of breath and was treated for decompensated heart failure in the setting of accelerated hypertension and acute pulmonary edema. He was diuresed, GDMT was optimized and was recommended wearable cardioverter defibrillator upon discharge. Repeat echo showed improvement in EF to 40% and vest has been discontinued. He feels great. He denies chest pain, shortness breath, palpitation, lower extremity edema. States he is taking \"all the medication on the list\" but does not know any of their names. He is not able to afford empagliflozin, we had talked about this months ago and he does not want to take it. He had received a call from the CHF clinic recently to started and he was bit confused about this. He does not want to take any new medications.     Review of Systems:   Negative except as described above    Past Medical History:  Past Medical History:   Diagnosis Date    Arthritis     ASK WHERE    Blood disorder     Bone marrow disorder     Bone spur     IN CERVICAL REGION    CAD in native artery 03/16/2022    Cerebral meningioma (HCC)     Cervical radiculopathy     CHF (congestive heart failure) (HCC)     Claustrophobia     Coronary artery disease involving native coronary artery     Diabetes mellitus (HCC)     Disc displacement, lumbar     Enlarged heart     Enlarged prostate     GERD (gastroesophageal reflux disease)     Hemoglobin disorder ([facility] [address] 2012    HIGH. .. HAS F/U ON 12 WITH DR. JIMENEZ    Hernia     Hyperlipidemia     Hypertension     Low back pain     Osteolytic lesion     Panic anxiety syndrome     S/P angioplasty with stent 2022    LAD, OM, RCA    Sleep apnea     awaiting [facility] back ordered       Past Surgical History:  Past Surgical History:   Procedure Laterality Date    BACK SURGERY  8/10/11    DR. Christiane Soto AT [facility]. .. FUSION    BRAIN SURGERY      CRANIECTOMY  3/22/10    RIGHT TEMPORAL REGION    CRANIOTOMY  5/10/10    RIGHT FRONTOTEMPORAL [address] [place] [id number]    DR. RIVERS       Family History:  Family History   Problem Relation Age of Onset    Heart Disease Mother     Prostate Cancer Father        Social History:  Social History     Tobacco Use    Smoking status: Former     Packs/day: 1.00     Types: Cigarettes     Quit date: 3/10/2022     Years since quittin.7    Smokeless tobacco: Never   Vaping Use    Vaping Use: Never used   Substance Use Topics    Alcohol use: Yes     Comment: DRINKS BEER WEEKLY; UNKNOWN AMOUNT    Drug use: No        Allergies:   Allergies   Allergen Reactions    Adhesive Tape Rash       Current Medications:    Current Outpatient Medications:     bumetanide (BUMEX) 1 MG tablet, Take 1 tablet by mouth daily, Disp: 30 tablet, Rfl: 3    Misc Natural Products (PUMPKIN SEED OIL PO), Take 1,000 mLs by mouth daily, Disp: , Rfl:     Misc Natural Products (MENS PROSTATE HEALTH FORMULA PO), Take 1 tablet by mouth in the morning and at bedtime, Disp: , Rfl:     Probiotic Product (HEALTHY COLON PO), Take 1 tablet by mouth daily, Disp: , Rfl:     polyethylene glycol (GLYCOLAX) 17 GM/SCOOP powder, Take 17 g by mouth daily, Disp: , Rfl:     loratadine (CLARITIN) 10 MG capsule, Take 10 mg by mouth daily, Disp: , Rfl:     clopidogrel (PLAVIX) 75 MG tablet, Take 75 mg by mouth daily, Disp: , Rfl:     carvedilol (COREG) 12.5 MG tablet, Take 1 tablet by mouth in the morning and 1 tablet before bedtime. (Patient taking differently: Take 12.5 mg by mouth 2 times daily (with meals) Indications: medication reduced by Dr. Antonella Cooney due to patient stating he was having dizziness with 12.5 mg twice daily dose), Disp: 60 tablet, Rfl: 5    busPIRone (BUSPAR) 7.5 MG tablet, 7.5 mg 2 times daily , Disp: , Rfl:     apixaban (ELIQUIS) 5 MG TABS tablet, Take 1 tablet by mouth 2 times daily, Disp: 60 tablet, Rfl: 0    sacubitril-valsartan (ENTRESTO) 24-26 MG per tablet, Take 1 tablet by mouth 2 times daily, Disp: 60 tablet, Rfl: 3    metFORMIN (GLUCOPHAGE-XR) 500 MG extended release tablet, Take 2 tablets by mouth daily (with breakfast) (Patient taking differently: Take 500 mg by mouth 2 times daily), Disp: 30 tablet, Rfl: 3    rosuvastatin (CRESTOR) 40 MG tablet, Take 1 tablet by mouth nightly, Disp: 30 tablet, Rfl: 0    famotidine (PEPCID) 40 MG tablet, Take 40 mg by mouth daily, Disp: , Rfl:     tiZANidine (ZANAFLEX) 4 MG tablet, Take 4 mg by mouth at bedtime, Disp: , Rfl:     zolpidem (AMBIEN) 10 MG tablet, Take by mouth nightly as needed for Sleep., Disp: , Rfl:     Physical Exam:  /80   Pulse 76   Resp 18   Ht 5' 8\" (1.727 m)   Wt 245 lb 14.4 oz (111.5 kg)   BMI 37.39 kg/m²   Wt Readings from Last 3 Encounters:   12/09/22 245 lb 14.4 oz (111.5 kg)   12/07/22 246 lb (111.6 kg)   11/02/22 246 lb 6 oz (111.8 kg)     Appearance: Awake, alert and oriented x 3, no acute respiratory distress  Skin: Intact, no rash  Head: Normocephalic, atraumatic  Eyes: EOMI, no conjunctival erythema  ENMT: No pharyngeal erythema, MMM, no rhinorrhea  Neck: Supple, no elevated JVP, no carotid bruits  Lungs: Clear to auscultation bilaterally. No wheezes, rales, or rhonchi. Cardiac: Irregular rhythm with a normal rate, +S1S2, no murmurs apparent.     Abdomen: Soft, nontender, +bowel sounds  Extremities: Moves all extremities x 4, no lower extremity edema  Neurologic: No focal motor deficits apparent, normal mood and affect, alert and oriented x 3  Peripheral Pulses: Intact posterior tibial pulses bilaterally    Laboratory Tests:  Lab Results   Component Value Date    CREATININE 0.8 2022    BUN 18 2022     2022    K 4.6 2022    CL 98 2022    CO2 24 2022     Lab Results   Component Value Date/Time    MG 1.9 2022 05:40 AM     Lab Results   Component Value Date    WBC 10.1 2022    HGB 11.5 (L) 2022    HCT 33.6 (L) 2022    MCV 91.3 2022     2022     Lab Results   Component Value Date    ALT 7 2022    AST 8 2022    ALKPHOS 65 2022    BILITOT 0.3 2022     Lab Results   Component Value Date    CKTOTAL 73 2010    CKMB 1.1 2010    TROPONINI <0.01 2015    TROPONINI 0.02 2010    TROPONINI 0.01 2010     Lab Results   Component Value Date    INR 1.1 03/15/2022    INR 1.0 2011    INR 1.0 2010    PROTIME 11.7 03/15/2022    PROTIME 11.6 2011    PROTIME 11.4 2010     Lab Results   Component Value Date    TSH 4.710 (H) 2022     Lab Results   Component Value Date    LABA1C 8.2 (H) 2022     No results found for: EAG  Lab Results   Component Value Date    CHOL 107 2022    CHOL 213 (H) 2010     Lab Results   Component Value Date    TRIG 103 2022    TRIG 191 (H) 2010     Lab Results   Component Value Date    HDL 34 2022    HDL 40.0 (A) 2010     Lab Results   Component Value Date    LDLCALC 52 2022    LDLCALC 135 (H) 2010     Lab Results   Component Value Date    LABVLDL 21 2022     No results found for: CHOLHDLRATIO  Recent Labs     22  1006 22  0903   PROBNP 120 129*       Cardiac Tests:  EC2022: Atrial fibrillation 80 bpm.  Normal axis and intervals. Prior anterior infarct. Nonspecific T wave changes    2022: Sinus rhythm 76 beats minute.   Normal axis and intervals. Nonspecific T wave changes    Echocardiogram:   TTE 6/30/22   Summary   Left ventricle is borderline dilated. LVEDD 5.8 cm. LV systolic function is moderately reduced. Ejection fraction is visually estimated at 40%. Grade I diastolic dysfunction. No regional wall motion abnormalities seen. Mild left ventricular concentric hypertrophy noted. Normal right ventricular size and function. No significant valvular abnormalities. Compared to studies from 1/2022 and 4/2022, LVEF has improved. TTE 1/21/22 Christen Franco       Summary   difficult visualization. Contrast used. moderate global hypokinesis. Inferior akinesis. EF 30%   Severely reduced left ventricular systolic function. Stage I diastolic dysfunction. Trace to mild mitral regurgitation. Limited TTE 4/1/2022 [facility]  Dilated LV, global hypokinesis with posterior akinesis  EF 30%  Mild MR    Stress test:    Pharmacologic stress 12/13/2021  Gated SPECT left ventricular ejection fraction was calculated to be 25%, with inferior hypokinesis. Impression:    ECG during the infusion did not change. The myocardial perfusion imaging was abnormal.    The abnormality was a a large sized fixed defect in the inferior wall suggestive of a prior MI     Overall left ventricular systolic function was abnormal with regional wall motion abnormalities. Intermediate risk general pharmacologic stress test.      Cardiac catheterization:   C/PCI 3/16/22 [facility]      IFR of the ostial LAD: 0.95, 0.95, 0.94. Angiographic Results/findings:  Left Main: No angiographically significant stenosis. LAD: Ostial diffuse eccentric 50% stenosis. Mid long diffuse 80% stenosis. D1: Mild luminal irregularities. D2: No angiographically significant stenosis. [facility] Cx: Codominant vessel. No angiographically significant stenosis. .  OM1: Large vessel. Mid mild luminal irregularities. There is a small lateral branch that is a 1.8 to 2 mm vessel.   This has an ostial 99% subtotal occlusion. Not amenable to PCI. Richard Ogren OM2: 2.0 mm vessel. Mid diffuse 90% stenosis. OM 3: Tiny vessel. OM 4: No angiographically significant stenosis. OM 5: (PDA) no angiographically significant stenosis. .  Ramus: Absent. RCA: Mid diffuse eccentric 80% stenosis. .  PDA: Off of the circumflex as above. .  PLB: No angiographically significant stenosis. #Percutaneous coronary artery intervention of the mid LAD with a 2.5 x 22 mm drug-eluting stent. #Percutaneous coronary artery intervention of the OM2 with a 2.0 x 12 mm drug-eluting stent. #Percutaneous coronary intervention of the mid RCA with a 3.25 x 15 mm drug-eluting stent. #iFR of the ostial LAD. Orders Placed This Encounter   Procedures    EKG 12 lead    ECHO LIMITED        Requested Prescriptions     Signed Prescriptions Disp Refills    bumetanide (BUMEX) 1 MG tablet 30 tablet 3     Sig: Take 1 tablet by mouth daily        ASSESSMENT / PLAN:  Multivessel CAD status post multivessel PCI to mLAD, OM2, mRCA 3/2022  Ischemic cardiomyopathy. EF 25% by SPECT, 30% by echo -> 40%  Chronic HFmEF. He is euvolemic. NYHA class II/ACC stage C  New onset atrial fibrillation. Initially noted 4/12/2022 office visit. [id number] at least 4. Spontaneously converted to sinus  Hyponatremia  Hypertension, well controlled  Type 2 diabetes, on Metformin. A1c 6.9%  Hyperlipidemia, on ezetimibe. Statin intolerant  NISH, untreated, intolerant to CPAP and sent back  History of cerebral meningioma status post surgical resection  GERD  Chronic neck pain, back pain  Obesity  Tobacco abuse, quit  Severe anxiety    Recommendations:  He is doing well from cardiac standpoint feels great, tolerating the present regimen. He has had a lot of issues with medication intolerance in the past and does not want to make any changes.     Decrease bumetanide 1 mg daily, with additional doses as needed  Continue GDMT for cardiomyopathy  Continue carvedilol 12.5 mg twice daily  Sacubitril/valsartan 24-26 mg twice daily  Spironolactone 25 mg daily  Recommend SGLT2 inhibitor -patient previously declined  Plan to repeat echo in the spring  Continue apixaban plus clopidogrel till 3/2023, then change clopidogrel to low-dose aspirin  Continue statin  Increase physical activity  Aggressive risk factor modification, including ongoing smoking cessation recommended  Follow-up in 6 months or sooner if need arises    The patient's current medication list, allergies, problem list and results of all previously ordered testing were reviewed at today's visit.     Liberty Tobin MD, [facility]

## 2023-01-11 ENCOUNTER — HOSPITAL ENCOUNTER (OUTPATIENT)
Dept: OTHER | Age: 63
Setting detail: THERAPIES SERIES
Discharge: HOME | End: 2023-01-11
Payer: MEDICARE

## 2023-01-11 VITALS
BODY MASS INDEX: 37.56 KG/M2 | HEART RATE: 79 BPM | RESPIRATION RATE: 18 BRPM | OXYGEN SATURATION: 92 % | SYSTOLIC BLOOD PRESSURE: 110 MMHG | DIASTOLIC BLOOD PRESSURE: 57 MMHG | WEIGHT: 247 LBS

## 2023-01-11 LAB
ANION GAP SERPL CALCULATED.3IONS-SCNC: 12 MMOL/L (ref 7–16)
BUN BLDV-MCNC: 15 MG/DL (ref 6–23)
CALCIUM SERPL-MCNC: 9.4 MG/DL (ref 8.6–10.2)
CHLORIDE BLD-SCNC: 95 MMOL/L (ref 98–107)
CO2: 25 MMOL/L (ref 22–29)
CREAT SERPL-MCNC: 0.7 MG/DL (ref 0.7–1.2)
GFR SERPL CREATININE-BSD FRML MDRD: >60 ML/MIN/1.73
GLUCOSE BLD-MCNC: 205 MG/DL (ref 74–99)
POTASSIUM SERPL-SCNC: 3.9 MMOL/L (ref 3.5–5)
PRO-BNP: 98 PG/ML (ref 0–125)
SODIUM BLD-SCNC: 132 MMOL/L (ref 132–146)

## 2023-01-11 PROCEDURE — 83880 ASSAY OF NATRIURETIC PEPTIDE: CPT

## 2023-01-11 PROCEDURE — 80048 BASIC METABOLIC PNL TOTAL CA: CPT

## 2023-01-11 PROCEDURE — 99214 OFFICE O/P EST MOD 30 MIN: CPT

## 2023-01-11 PROCEDURE — 36415 COLL VENOUS BLD VENIPUNCTURE: CPT

## 2023-01-11 NOTE — PROGRESS NOTES
Congestive Heart Failure [place] [address] [facility]        Genet Armijo   1960          Referring Provider:   Primary Care Physician: Dr. Jay Felton  Cardiologist: Dr. Renata Dan  Nephrologist:        History of Present Illness:     Genet Armijo is a 58 y.o. male with a history of HFrEF, most recent EF 40% from 6/30/22. Patient Story:  He does not  have dyspnea with exertion, shortness of breath, or decline in overall functional capacity. He does not have orthopnea, PND, nocturnal cough or hemoptysis. He does not have abdominal distention or bloating, early satiety, anorexia/change in appetite. He does have a good urinary response to the oral diuretic. He does not have lower extremity edema. He denies lightheadedness, dizziness. He denies palpitations, syncope or near syncope. He does not complain of chest pain, pressure, discomfort. Allergies   Allergen Reactions    Adhesive Tape Rash           Prior to Visit Medications    Medication Sig Taking? Authorizing Provider   bumetanide (BUMEX) 1 MG tablet Take 1 tablet by mouth daily  Patient taking differently: Take 0.5 mg by mouth daily  MD Stacia Jernigan Natural Products (PUMPKIN SEED OIL PO) Take 1,000 mLs by mouth daily  Historical Provider, MD   Misc Natural Products ([address]) Take 1 tablet by mouth in the morning and at bedtime  Historical Provider, MD   Probiotic Product (HEALTHY COLON PO) Take 1 tablet by mouth daily  Historical Provider, MD   polyethylene glycol (GLYCOLAX) 17 GM/SCOOP powder Take 17 g by mouth daily  Historical Provider, MD   loratadine (CLARITIN) 10 MG capsule Take 10 mg by mouth daily  Historical Provider, MD   clopidogrel (PLAVIX) 75 MG tablet Take 75 mg by mouth daily  Historical Provider, MD   carvedilol (COREG) 12.5 MG tablet Take 1 tablet by mouth in the morning and 1 tablet before bedtime.   Patient taking differently: Take 12.5 mg by mouth 2 times daily (with meals) Indications: medication reduced by Dr. Mary Saleem due to patient stating he was having dizziness with 12.5 mg twice daily dose  Sameera Gil MD   busPIRone (BUSPAR) 7.5 MG tablet 7.5 mg 2 times daily   Historical Provider, MD   apixaban (ELIQUIS) 5 MG TABS tablet Take 1 tablet by mouth 2 times daily  Sameera Gil MD   sacubitril-valsartan (ENTRESTO) 24-26 MG per tablet Take 1 tablet by mouth 2 times daily  Arcelia Apodaca MD   metFORMIN (GLUCOPHAGE-XR) 500 MG extended release tablet Take 2 tablets by mouth daily (with breakfast)  Patient taking differently: Take 1,000 mg by mouth 2 times daily  Jacquiline Medici, DO   rosuvastatin (CRESTOR) 40 MG tablet Take 1 tablet by mouth nightly  Justine Dodge, DO   famotidine (PEPCID) 40 MG tablet Take 40 mg by mouth daily  Historical Provider, MD   tiZANidine (ZANAFLEX) 4 MG tablet Take 4 mg by mouth at bedtime  Historical Provider, MD   zolpidem (AMBIEN) 10 MG tablet Take by mouth nightly as needed for Sleep. Historical Provider, MD           Guideline directed medical:  ARNI/ACE I/ARB: Yes  Beta blocker: Yes  Aldosterone antagonist:  No- patient stopped taking on 08/22/22 due to photosensitivity side effects, states Dr Natty Nguyễn is aware.         Physical Examination:     BP (!) 110/57   Pulse 79   Resp 18   Wt 247 lb (112 kg)   SpO2 92%   BMI 37.56 kg/m²     Assessment  Charting Type: Shift assessment    Neurological  Level of Consciousness: Alert (0)              Respiratory  Respiratory Pattern: Regular  Respiratory Depth: Normal  Respiratory Quality/Effort: Unlabored  Chest Assessment: Chest expansion symmetrical  L Breath Sounds: Clear  R Breath Sounds: Clear    Breath Sounds  Right Upper Lobe: Clear  Right Middle Lobe: Clear  Right Lower Lobe: Clear  Left Upper Lobe: Clear  Left Lower Lobe: Clear         Cardiac  Cardiac Regularity: Regular  Cardiac Rhythm: Sinus rhythm    Rhythm Interpretation  Heart Rate: 79 Gastrointestinal  Abdominal (WDL): Within Defined Limits  Abdomen Inspection: Rounded, Soft  RUQ Bowel Sounds: Active  LUQ Bowel Sounds: Active  RLQ Bowel Sounds: Active  LLQ Bowel Sounds: Active          Bowel Sounds  RUQ Bowel Sounds: Active  LUQ Bowel Sounds: Active  RLQ Bowel Sounds: Active  LLQ Bowel Sounds: Active    Peripheral Vascular  Peripheral Vascular (WDL): Within Defined Limits  Edema: Right lower extremity, Left lower extremity  RLE Edema: None  LLE Edema: None                   Genitourinary  Genitourinary (WDL): Within Defined Limits    Psychosocial  Psychosocial (WDL): Within Defined Limits    Pain Assessment  Pain Assessment: None - Denies Pain                   Heart Rate: 79                     LAB DATA:    Last 3 BMP      Sodium (mmol/L)   Date Value   01/11/2023 132   12/09/2022 138   12/07/2022 130 (L)     Potassium (mmol/L)   Date Value   01/11/2023 3.9   12/09/2022 4.6   12/07/2022 4.2     Potassium reflex Magnesium (mmol/L)   Date Value   08/22/2022 3.8   04/29/2022 4.1   04/02/2022 4.3     Chloride (mmol/L)   Date Value   01/11/2023 95 (L)   12/09/2022 98   12/07/2022 94 (L)     CO2 (mmol/L)   Date Value   01/11/2023 25   12/09/2022 24   12/07/2022 25     BUN (mg/dL)   Date Value   01/11/2023 15   12/09/2022 18   12/07/2022 15     Glucose (mg/dL)   Date Value   01/11/2023 205 (H)   12/09/2022 238 (H)   12/07/2022 321 (H)   04/21/2011 78   04/17/2011 98   01/05/2011 129 (H)     Calcium (mg/dL)   Date Value   01/11/2023 9.4   12/09/2022 9.8   12/07/2022 9.3       Last 3 BNP       Pro-BNP (pg/mL)   Date Value   01/11/2023 98   12/09/2022 129 (H)   12/07/2022 120          CBC:   No results for input(s): WBC, HGB, PLT in the last 72 hours. BMP:    Recent Labs     01/11/23  1000      K 3.9   CL 95*   CO2 25   BUN 15   CREATININE 0.7   GLUCOSE 205*               Hepatic:   No results for input(s): AST, ALT, ALB, BILITOT, ALKPHOS in the last 72 hours.   Troponin: No results for input(s): TROPONINI in the last 72 hours. BNP: No results for input(s): BNP in the last 72 hours. Lipids: No results for input(s): CHOL, HDL in the last 72 hours. Invalid input(s): LDLCALCU  INR: No results for input(s): INR in the last 72 hours. WEIGHTS:    Wt Readings from Last 3 Encounters:   01/11/23 247 lb (112 kg)   12/09/22 245 lb 14.4 oz (111.5 kg)   12/07/22 246 lb (111.6 kg)         TELEMETRY:  Cardiac Regularity: Regular  Cardiac Rhythm/Interpretation: NSR        ASSESSMENT:  Tam Salguero weight gain of 1lb. since last CHF Clinic visit of 12/7/22. Upon assessment patient lungs are clear to auscultation, abdomen soft, no lower extremity edema noted. Patient instructed to keep following a low sodium diet and to hydrated with 64 ounces of fluid daily. Interventions completed this visit:  IV diuretics given no  Lab work obtained yes, BMP, BNP   Reviewed currently prescribed medications with patient, educated on importance of compliance and answered any questions regarding their medication  Educated on signs and symptoms of HF  Educated on low sodium diet    PLAN:  Future Appointments   Date Time Provider Tina Packer   2[facility]2023  [facility]              Given clinic phone number [phone] and aware of signs and symptoms to call with any HF change in symptoms.

## 2023-01-17 ENCOUNTER — TELEPHONE (OUTPATIENT)
Dept: CARDIOLOGY CLINIC | Age: 63
End: 2023-01-17

## 2023-01-17 NOTE — TELEPHONE ENCOUNTER
Contacted patient with lab results and recommendations per Dr. Sandrita Lizama. He verbalized understanding.    ----- Message from Simone Stephens MD sent at 1/17/2023  6:55 AM EST -----  Blood work looks great.   Continue same follow-up as scheduled

## 2023-01-27 ENCOUNTER — TELEPHONE (OUTPATIENT)
Dept: CARDIOLOGY | Age: 63
End: 2023-01-27

## 2023-02-08 ENCOUNTER — HOSPITAL ENCOUNTER (OUTPATIENT)
Dept: OTHER | Age: 63
Setting detail: THERAPIES SERIES
Discharge: HOME | End: 2023-02-08
Payer: MEDICARE

## 2023-02-08 VITALS
RESPIRATION RATE: 16 BRPM | BODY MASS INDEX: 37.77 KG/M2 | OXYGEN SATURATION: 95 % | WEIGHT: 248.38 LBS | HEART RATE: 77 BPM | SYSTOLIC BLOOD PRESSURE: 130 MMHG | DIASTOLIC BLOOD PRESSURE: 58 MMHG

## 2023-02-08 LAB
ANION GAP SERPL CALCULATED.3IONS-SCNC: 10 MMOL/L (ref 7–16)
BUN BLDV-MCNC: 15 MG/DL (ref 6–23)
CALCIUM SERPL-MCNC: 9.5 MG/DL (ref 8.6–10.2)
CHLORIDE BLD-SCNC: 96 MMOL/L (ref 98–107)
CO2: 26 MMOL/L (ref 22–29)
CREAT SERPL-MCNC: 0.7 MG/DL (ref 0.7–1.2)
GFR SERPL CREATININE-BSD FRML MDRD: >60 ML/MIN/1.73
GLUCOSE BLD-MCNC: 193 MG/DL (ref 74–99)
POTASSIUM SERPL-SCNC: 4 MMOL/L (ref 3.5–5)
PRO-BNP: 137 PG/ML (ref 0–125)
SODIUM BLD-SCNC: 132 MMOL/L (ref 132–146)

## 2023-02-08 PROCEDURE — 36415 COLL VENOUS BLD VENIPUNCTURE: CPT

## 2023-02-08 PROCEDURE — 83880 ASSAY OF NATRIURETIC PEPTIDE: CPT

## 2023-02-08 PROCEDURE — 99214 OFFICE O/P EST MOD 30 MIN: CPT

## 2023-02-08 PROCEDURE — 80048 BASIC METABOLIC PNL TOTAL CA: CPT

## 2023-02-08 NOTE — PROGRESS NOTES
Congestive Heart Failure [place] [address] [facility]        Letha Estevez   1960          Referring Provider:   Primary Care Physician: Dr. Sharon Zaragoza  Cardiologist: Dr. Mau Urrutia  Nephrologist:        History of Present Illness:     Letha Estevez is a 58 y.o. male with a history of HFrEF, most recent EF 40% from 6/30/22. Patient Story:  He does not  have dyspnea with exertion, shortness of breath, or decline in overall functional capacity. He does not have orthopnea, PND, nocturnal cough or hemoptysis. He does not have abdominal distention or bloating, early satiety, anorexia/change in appetite. He does have a good urinary response to the oral diuretic. He does not have lower extremity edema. He denies lightheadedness, dizziness. He denies palpitations, syncope or near syncope. He does not complain of chest pain, pressure, discomfort. Allergies   Allergen Reactions    Adhesive Tape Rash           Prior to Visit Medications    Medication Sig Taking? Authorizing Provider   bumetanide (BUMEX) 1 MG tablet Take 1 tablet by mouth daily  Patient taking differently: Take 0.5 mg by mouth daily  Nandini Charles MD   Misc Natural Products (PUMPKIN SEED OIL PO) Take 1,000 mLs by mouth daily  Historical Provider, MD   Misc Natural Products ([address]) Take 1 tablet by mouth in the morning and at bedtime  Historical Provider, MD   Probiotic Product (HEALTHY COLON PO) Take 1 tablet by mouth daily  Historical Provider, MD   polyethylene glycol (GLYCOLAX) 17 GM/SCOOP powder Take 17 g by mouth daily  Historical Provider, MD   loratadine (CLARITIN) 10 MG capsule Take 10 mg by mouth daily  Historical Provider, MD   clopidogrel (PLAVIX) 75 MG tablet Take 75 mg by mouth daily  Historical Provider, MD   carvedilol (COREG) 12.5 MG tablet Take 1 tablet by mouth in the morning and 1 tablet before bedtime.   Patient taking differently: Take 12.5 mg by mouth 2 times daily (with meals) Indications: medication reduced by Dr. Carmela Pope due to patient stating he was having dizziness with 12.5 mg twice daily dose  Todd Chinchilla MD   busPIRone (BUSPAR) 7.5 MG tablet 7.5 mg 2 times daily   Historical Provider, MD   apixaban (ELIQUIS) 5 MG TABS tablet Take 1 tablet by mouth 2 times daily  Todd Chinchilla MD   sacubitril-valsartan (ENTRESTO) 24-26 MG per tablet Take 1 tablet by mouth 2 times daily  Altagracia Lopez MD   metFORMIN (GLUCOPHAGE-XR) 500 MG extended release tablet Take 2 tablets by mouth daily (with breakfast)  Patient taking differently: Take 1,000 mg by mouth 2 times daily  Keenan Human, DO   rosuvastatin (CRESTOR) 40 MG tablet Take 1 tablet by mouth nightly  Noblesbaltazar Orozco Curtismer, DO   famotidine (PEPCID) 40 MG tablet Take 40 mg by mouth daily  Historical Provider, MD   tiZANidine (ZANAFLEX) 4 MG tablet Take 4 mg by mouth at bedtime  Historical Provider, MD   zolpidem (AMBIEN) 10 MG tablet Take by mouth nightly as needed for Sleep. Historical Provider, MD           Guideline directed medical:  ARNI/ACE I/ARB: Yes  Beta blocker: Yes  Aldosterone antagonist:  No- patient stopped taking on 08/22/22 due to photosensitivity side effects, states Dr Merrick Higginbotham is aware.         Physical Examination:     BP (!) 130/58   Pulse 77   Resp 16   Wt 248 lb 6 oz (112.7 kg)   SpO2 95%   BMI 37.77 kg/m²     Assessment  Charting Type: Shift assessment    Neurological  Level of Consciousness: Alert (0)              Respiratory  Respiratory Pattern: Regular  Respiratory Depth: Normal  Respiratory Quality/Effort: Unlabored  Chest Assessment: Chest expansion symmetrical  L Breath Sounds: Clear  R Breath Sounds: Clear  Level of Activity/Mobility: Ambulatory    Breath Sounds  Right Upper Lobe: Clear  Right Middle Lobe: Clear  Right Lower Lobe: Clear  Left Upper Lobe: Clear  Left Lower Lobe: Clear         Cardiac  Cardiac Regularity: Regular  Cardiac Rhythm: Sinus rhythm    Rhythm Interpretation  Heart Rate: 77         Gastrointestinal  Abdominal (WDL): Within Defined Limits  Abdomen Inspection: Rounded, Soft  RUQ Bowel Sounds: Active  LUQ Bowel Sounds: Active  RLQ Bowel Sounds: Active  LLQ Bowel Sounds: Active          Bowel Sounds  RUQ Bowel Sounds: Active  LUQ Bowel Sounds: Active  RLQ Bowel Sounds: Active  LLQ Bowel Sounds: Active    Peripheral Vascular  Peripheral Vascular (WDL): Within Defined Limits  Edema: Right lower extremity, Left lower extremity  RLE Edema: None  LLE Edema: None                   Genitourinary  Genitourinary (WDL): Within Defined Limits    Psychosocial  Psychosocial (WDL): Within Defined Limits    Pain Assessment  Pain Assessment: None - Denies Pain                   Heart Rate: 77                     LAB DATA:    Last 3 BMP      Sodium (mmol/L)   Date Value   02/08/2023 132   01/11/2023 132   12/09/2022 138     Potassium (mmol/L)   Date Value   02/08/2023 4.0   01/11/2023 3.9   12/09/2022 4.6     Potassium reflex Magnesium (mmol/L)   Date Value   08/22/2022 3.8   04/29/2022 4.1   04/02/2022 4.3     Chloride (mmol/L)   Date Value   02/08/2023 96 (L)   01/11/2023 95 (L)   12/09/2022 98     CO2 (mmol/L)   Date Value   02/08/2023 26   01/11/2023 25   12/09/2022 24     BUN (mg/dL)   Date Value   02/08/2023 15   01/11/2023 15   12/09/2022 18     Glucose (mg/dL)   Date Value   02/08/2023 193 (H)   01/11/2023 205 (H)   12/09/2022 238 (H)   04/21/2011 78   04/17/2011 98   01/05/2011 129 (H)     Calcium (mg/dL)   Date Value   02/08/2023 9.5   01/11/2023 9.4   12/09/2022 9.8       Last 3 BNP       Pro-BNP (pg/mL)   Date Value   02/08/2023 137 (H)   01/11/2023 98   12/09/2022 129 (H)          CBC:   No results for input(s): WBC, HGB, PLT in the last 72 hours.   BMP:    Recent Labs     02/08/23  0955      K 4.0   CL 96*   CO2 26   BUN 15   CREATININE 0.7   GLUCOSE 193*                 Hepatic:   No results for input(s): AST, ALT, ALB, BILITOT, ALKPHOS in the last 72 hours.  Troponin: No results for input(s): TROPONINI in the last 72 hours. BNP: No results for input(s): BNP in the last 72 hours. Lipids: No results for input(s): CHOL, HDL in the last 72 hours. Invalid input(s): LDLCALCU  INR: No results for input(s): INR in the last 72 hours. WEIGHTS:    Wt Readings from Last 3 Encounters:   02/08/23 248 lb 6 oz (112.7 kg)   01/11/23 247 lb (112 kg)   12/09/22 245 lb 14.4 oz (111.5 kg)         TELEMETRY:  Cardiac Regularity: Regular  Cardiac Rhythm/Interpretation: NSR        ASSESSMENT:  Bran Ling weight gain of 1lb. since last CHF Clinic visit of 1/11/22. Upon assessment patient lungs are clear to auscultation, abdomen soft, no lower extremity edema noted. Patient instructed to keep following a low sodium diet and to hydrated with 64 ounces of fluid daily. Interventions completed this visit:  IV diuretics given no  Lab work obtained yes, BMP, BNP   Reviewed currently prescribed medications with patient, educated on importance of compliance and answered any questions regarding their medication  Educated on signs and symptoms of HF  Educated on low sodium diet    PLAN:  Future Appointments   Date Time Provider Tina Packer   3/8/2023  9:30 AM [facility] ROOM 1 [facility]   5/3/2023  9:15 AM  ALEENA ECHO  2 [facility] [address] [facility]              Given clinic phone number [phone] and aware of signs and symptoms to call with any HF change in symptoms.

## 2023-02-13 ENCOUNTER — TELEPHONE (OUTPATIENT)
Dept: CARDIOLOGY CLINIC | Age: 63
End: 2023-02-13

## 2023-02-13 NOTE — TELEPHONE ENCOUNTER
Left message for patient to contact office. ----- Message from Dalila Flores MD sent at 2/13/2023  3:16 PM EST -----  Blood work looks good, very stable.   Continue same follow-up as scheduled

## 2023-03-08 ENCOUNTER — HOSPITAL ENCOUNTER (OUTPATIENT)
Dept: OTHER | Age: 63
Discharge: HOME | End: 2023-03-08

## 2023-03-16 NOTE — CARE COORDINATION
Faxed clearance to number provided. Spoke with patient. He is willing to talk with [facility] about [facility]. Referral to [facility]. Patient currently wearing oxygen, he does not have a nebulizer or home oxygen. He lives with his spouse, he does not use assistive devices in the home. PCP is Dr. Vicenta Dawkins. No current homecare. Plan is home when released. If oxygen needed at discharge will need walking pulse ox. For questions I can be reached at [phone].  [facility]

## 2023-03-21 ENCOUNTER — HOSPITAL ENCOUNTER (OUTPATIENT)
Dept: OTHER | Age: 63
Setting detail: THERAPIES SERIES
Discharge: HOME | End: 2023-03-21
Payer: MEDICARE

## 2023-03-21 VITALS
DIASTOLIC BLOOD PRESSURE: 71 MMHG | SYSTOLIC BLOOD PRESSURE: 126 MMHG | WEIGHT: 246 LBS | HEART RATE: 83 BPM | OXYGEN SATURATION: 95 % | RESPIRATION RATE: 16 BRPM | BODY MASS INDEX: 37.4 KG/M2

## 2023-03-21 LAB
ANION GAP SERPL CALCULATED.3IONS-SCNC: 8 MMOL/L (ref 7–16)
BNP BLD-MCNC: 148 PG/ML (ref 0–125)
BUN SERPL-MCNC: 13 MG/DL (ref 6–23)
CALCIUM SERPL-MCNC: 9.6 MG/DL (ref 8.6–10.2)
CHLORIDE SERPL-SCNC: 99 MMOL/L (ref 98–107)
CO2 SERPL-SCNC: 28 MMOL/L (ref 22–29)
CREAT SERPL-MCNC: 0.7 MG/DL (ref 0.7–1.2)
GLUCOSE SERPL-MCNC: 210 MG/DL (ref 74–99)
POTASSIUM SERPL-SCNC: 4.3 MMOL/L (ref 3.5–5)
SODIUM SERPL-SCNC: 135 MMOL/L (ref 132–146)

## 2023-03-21 PROCEDURE — 83880 ASSAY OF NATRIURETIC PEPTIDE: CPT

## 2023-03-21 PROCEDURE — 36415 COLL VENOUS BLD VENIPUNCTURE: CPT

## 2023-03-21 PROCEDURE — 80048 BASIC METABOLIC PNL TOTAL CA: CPT

## 2023-03-21 PROCEDURE — 99214 OFFICE O/P EST MOD 30 MIN: CPT

## 2023-03-21 RX ORDER — FERROUS SULFATE 325(65) MG
325 TABLET ORAL 2 TIMES DAILY
COMMUNITY

## 2023-03-21 RX ORDER — DOXYCYCLINE HYCLATE 100 MG/1
100 CAPSULE ORAL 2 TIMES DAILY
COMMUNITY

## 2023-03-21 NOTE — PROGRESS NOTES
mg by mouth daily  Historical Provider, MD   clopidogrel (PLAVIX) 75 MG tablet Take 75 mg by mouth daily  Historical Provider, MD   carvedilol (COREG) 12.5 MG tablet Take 1 tablet by mouth in the morning and 1 tablet before bedtime. Patient taking differently: Take 12.5 mg by mouth 2 times daily (with meals) Indications: medication reduced by Dr. Romelia Hall due to patient stating he was having dizziness with 12.5 mg twice daily dose  Brennon Acosta MD   busPIRone (BUSPAR) 7.5 MG tablet 7.5 mg 2 times daily   Historical Provider, MD   apixaban (ELIQUIS) 5 MG TABS tablet Take 1 tablet by mouth 2 times daily  Brennon Acosta MD   sacubitril-valsartan (ENTRESTO) 24-26 MG per tablet Take 1 tablet by mouth 2 times daily  Elmo Nazario MD   metFORMIN (GLUCOPHAGE-XR) 500 MG extended release tablet Take 2 tablets by mouth daily (with breakfast)  Patient taking differently: Take 1,000 mg by mouth 2 times daily  Susana Lal,    rosuvastatin (CRESTOR) 40 MG tablet Take 1 tablet by mouth nightly  Stephani Dodge,    famotidine (PEPCID) 40 MG tablet Take 40 mg by mouth daily  Historical Provider, MD   tiZANidine (ZANAFLEX) 4 MG tablet Take 4 mg by mouth at bedtime  Historical Provider, MD   zolpidem (AMBIEN) 10 MG tablet Take by mouth nightly as needed for Sleep. Historical Provider, MD           Guideline directed medical:  ARNI/ACE I/ARB: Yes  Beta blocker: Yes  Aldosterone antagonist:  No- patient stopped taking on 08/22/22 due to photosensitivity side effects, states Dr Gabi Hutchinson is aware.         Physical Examination:     /71   Pulse 83   Resp 16   Wt 246 lb (111.6 kg)   SpO2 95%   BMI 37.40 kg/m²     Assessment  Charting Type: Shift assessment (chf clinic)    Neurological  Level of Consciousness: Alert (0)              Respiratory  Respiratory Quality/Effort: Unlabored  Chest Assessment: Chest expansion symmetrical    Breath Sounds  Right Upper Lobe: Clear  Right Middle Lobe: Clear  Right Lower Lobe:

## 2023-03-29 ENCOUNTER — TELEPHONE (OUTPATIENT)
Dept: CARDIOLOGY CLINIC | Age: 63
End: 2023-03-29

## 2023-03-29 NOTE — TELEPHONE ENCOUNTER
Contacted patient with lab results and recommendations per Dr. Ema Can. Patient verbalized understanding.    ----- Message from Manisha Foote MD sent at 3/28/2023 12:20 PM EDT -----  Blood work stable. Looks like he is doing well. Continue same and follow-up as scheduled.

## 2023-04-17 ENCOUNTER — HOSPITAL ENCOUNTER (OUTPATIENT)
Age: 63
Discharge: HOME | End: 2023-04-19

## 2023-04-17 PROCEDURE — 88305 TISSUE EXAM BY PATHOLOGIST: CPT

## 2023-04-21 ENCOUNTER — HOSPITAL ENCOUNTER (OUTPATIENT)
Dept: OTHER | Age: 63
Setting detail: THERAPIES SERIES
Discharge: HOME | End: 2023-04-21
Payer: MEDICARE

## 2023-04-21 VITALS
DIASTOLIC BLOOD PRESSURE: 56 MMHG | WEIGHT: 248 LBS | SYSTOLIC BLOOD PRESSURE: 118 MMHG | OXYGEN SATURATION: 91 % | HEART RATE: 88 BPM | BODY MASS INDEX: 37.71 KG/M2 | RESPIRATION RATE: 16 BRPM

## 2023-04-21 LAB
ANION GAP SERPL CALCULATED.3IONS-SCNC: 12 MMOL/L (ref 7–16)
BNP BLD-MCNC: 172 PG/ML (ref 0–125)
BUN SERPL-MCNC: 13 MG/DL (ref 6–23)
CALCIUM SERPL-MCNC: 8.8 MG/DL (ref 8.6–10.2)
CHLORIDE SERPL-SCNC: 96 MMOL/L (ref 98–107)
CO2 SERPL-SCNC: 27 MMOL/L (ref 22–29)
CREAT SERPL-MCNC: 0.7 MG/DL (ref 0.7–1.2)
GLUCOSE SERPL-MCNC: 224 MG/DL (ref 74–99)
POTASSIUM SERPL-SCNC: 3.6 MMOL/L (ref 3.5–5)
SODIUM SERPL-SCNC: 135 MMOL/L (ref 132–146)

## 2023-04-21 PROCEDURE — 36415 COLL VENOUS BLD VENIPUNCTURE: CPT

## 2023-04-21 PROCEDURE — 80048 BASIC METABOLIC PNL TOTAL CA: CPT

## 2023-04-21 PROCEDURE — 99214 OFFICE O/P EST MOD 30 MIN: CPT

## 2023-04-21 PROCEDURE — 83880 ASSAY OF NATRIURETIC PEPTIDE: CPT

## 2023-04-21 RX ORDER — LANSOPRAZOLE 30 MG/1
30 CAPSULE, DELAYED RELEASE ORAL DAILY
COMMUNITY

## 2023-04-21 NOTE — PROGRESS NOTES
Take 1 tablet by mouth daily  Historical Provider, MD   polyethylene glycol (GLYCOLAX) 17 GM/SCOOP powder Take 17 g by mouth daily  Historical Provider, MD   loratadine (CLARITIN) 10 MG capsule Take 1 capsule by mouth daily  Historical Provider, MD   clopidogrel (PLAVIX) 75 MG tablet Take 1 tablet by mouth daily  Historical Provider, MD   carvedilol (COREG) 12.5 MG tablet Take 1 tablet by mouth in the morning and 1 tablet before bedtime. Patient taking differently: Take 1 tablet by mouth 2 times daily (with meals) Indications: medication reduced by Dr. Marv Mcpherson due to patient stating he was having dizziness with 12.5 mg twice daily dose  Sue Mendoza MD   busPIRone (BUSPAR) 7.5 MG tablet 1 tablet 2 times daily  Historical Provider, MD   apixaban (ELIQUIS) 5 MG TABS tablet Take 1 tablet by mouth 2 times daily  Sue Mendoza MD   sacubitril-valsartan (ENTRESTO) 24-26 MG per tablet Take 1 tablet by mouth 2 times daily  Magnolia Pinzon MD   metFORMIN (GLUCOPHAGE-XR) 500 MG extended release tablet Take 2 tablets by mouth daily (with breakfast)  Patient taking differently: Take 2 tablets by mouth 2 times daily  Nito Sandoval,    rosuvastatin (CRESTOR) 40 MG tablet Take 1 tablet by mouth nightly  Jazmine Dodge,    famotidine (PEPCID) 40 MG tablet Take 1 tablet by mouth daily  Historical Provider, MD   tiZANidine (ZANAFLEX) 4 MG tablet Take 1 tablet by mouth at bedtime  Historical Provider, MD   zolpidem (AMBIEN) 10 MG tablet Take by mouth nightly as needed for Sleep. Historical Provider, MD           Guideline directed medical:  ARNI/ACE I/ARB: Yes  Beta blocker: Yes  Aldosterone antagonist:  No- patient stopped taking on 08/22/22 due to photosensitivity side effects, states Dr Jazmín Buitrago is aware.         Physical Examination:     BP (!) 118/56   Pulse 88   Resp 16   Wt 248 lb (112.5 kg)   SpO2 91%   BMI 37.71 kg/m²     Assessment  Charting Type: Shift assessment (chf clinic)    Neurological  Level of

## 2023-04-27 ENCOUNTER — TELEPHONE (OUTPATIENT)
Dept: CARDIOLOGY CLINIC | Age: 63
End: 2023-04-27

## 2023-04-27 NOTE — TELEPHONE ENCOUNTER
Patient returned our call and was given results and recommendations per Dr. Antonella Cooney. Patient verbalized understanding.

## 2023-04-27 NOTE — TELEPHONE ENCOUNTER
Left message for patient to contact office. ----- Message from Barbara Nunez MD sent at 4/26/2023 12:20 PM EDT -----  Seems like he was doing well at the CHF clinic. Blood work very stable. Continue same, follow-up as scheduled.

## 2023-05-04 ENCOUNTER — HOSPITAL ENCOUNTER (OUTPATIENT)
Dept: CARDIOLOGY | Age: 63
Discharge: HOME | End: 2023-05-04
Payer: MEDICARE

## 2023-05-04 DIAGNOSIS — I25.5 ISCHEMIC CARDIOMYOPATHY: ICD-10-CM

## 2023-05-04 PROCEDURE — 93308 TTE F-UP OR LMTD: CPT

## 2023-05-17 ENCOUNTER — TELEPHONE (OUTPATIENT)
Dept: CARDIOLOGY CLINIC | Age: 63
End: 2023-05-17

## 2023-05-17 NOTE — TELEPHONE ENCOUNTER
Contacted patient with echo results and recommendations per Dr. Hosea Zapata. He verbalized understanding.    ----- Message from Bertha Rodriguez MD sent at 5/16/2023 10:30 AM EDT -----  Echo looks good. Ejection fraction low normal at 50-55%. Has steadily improved compared to last year. Continue same, follow-up as scheduled.

## 2023-05-22 ENCOUNTER — HOSPITAL ENCOUNTER (OUTPATIENT)
Dept: OTHER | Age: 63
Setting detail: THERAPIES SERIES
Discharge: HOME | End: 2023-05-22
Payer: MEDICARE

## 2023-05-22 VITALS
BODY MASS INDEX: 38.01 KG/M2 | RESPIRATION RATE: 18 BRPM | WEIGHT: 250 LBS | DIASTOLIC BLOOD PRESSURE: 57 MMHG | HEART RATE: 61 BPM | SYSTOLIC BLOOD PRESSURE: 109 MMHG | OXYGEN SATURATION: 97 %

## 2023-05-22 LAB
ANION GAP SERPL CALCULATED.3IONS-SCNC: 13 MMOL/L (ref 7–16)
BNP BLD-MCNC: 124 PG/ML (ref 0–125)
BUN SERPL-MCNC: 16 MG/DL (ref 6–23)
CALCIUM SERPL-MCNC: 9.8 MG/DL (ref 8.6–10.2)
CHLORIDE SERPL-SCNC: 95 MMOL/L (ref 98–107)
CO2 SERPL-SCNC: 25 MMOL/L (ref 22–29)
CREAT SERPL-MCNC: 0.8 MG/DL (ref 0.7–1.2)
GLUCOSE SERPL-MCNC: 210 MG/DL (ref 74–99)
POTASSIUM SERPL-SCNC: 4.2 MMOL/L (ref 3.5–5)
SODIUM SERPL-SCNC: 133 MMOL/L (ref 132–146)

## 2023-05-22 PROCEDURE — 36415 COLL VENOUS BLD VENIPUNCTURE: CPT

## 2023-05-22 PROCEDURE — 99214 OFFICE O/P EST MOD 30 MIN: CPT

## 2023-05-22 PROCEDURE — 83880 ASSAY OF NATRIURETIC PEPTIDE: CPT

## 2023-05-22 PROCEDURE — 80048 BASIC METABOLIC PNL TOTAL CA: CPT

## 2023-05-22 NOTE — PROGRESS NOTES
Congestive Heart Failure [place] [address] [facility]        Luanne Tripathi   1960          Referring Provider:   Primary Care Physician: Dr. Gabi Hutchinson  Cardiologist: Dr. Romelia Hall  Nephrologist:        History of Present Illness:     Luanne Tripathi is a 58 y.o. male with a history of HFrEF, most recent EF 50-55% from 5/4/2023. Patient Story:  He does not  have dyspnea with exertion, shortness of breath, or decline in overall functional capacity. He does not have orthopnea, PND, nocturnal cough or hemoptysis. He does not have abdominal distention or bloating, early satiety, anorexia/change in appetite. He does have a good urinary response to the oral diuretic. He does not have lower extremity edema. He denies lightheadedness, dizziness. He denies palpitations, syncope or near syncope. He does not complain of chest pain, pressure, discomfort. Allergies   Allergen Reactions    Adhesive Tape Rash           Prior to Visit Medications    Medication Sig Taking?  Authorizing Provider   lansoprazole (PREVACID) 30 MG delayed release capsule Take 1 capsule by mouth daily  Patient not taking: Reported on 5/22/2023  Historical Provider, MD   doxycycline hyclate (VIBRAMYCIN) 100 MG capsule Take 100 mg by mouth 2 times daily Patient on d/t recently had Covid  Patient not taking: Reported on 4/21/2023  Historical Provider, MD   ferrous sulfate (IRON 325) 325 (65 Fe) MG tablet Take 325 mg by mouth 2 times daily at 0800 and 1400  Patient not taking: Reported on 4/21/2023  Historical Provider, MD   bumetanide (BUMEX) 1 MG tablet Take 1 tablet by mouth daily  Patient taking differently: Take 0.5 tablets by mouth 2 times daily  MD Stacia Garcia Natural Products (PUMPKIN SEED OIL PO) Take 1,000 mLs by mouth daily  Historical Provider, MD   Misc Natural Products ([address]) Take 1 tablet by mouth in the morning and at bedtime  Historical Provider

## 2023-05-22 NOTE — PLAN OF CARE
Problem: Chronic Conditions and Co-morbidities  Goal: Patient's chronic conditions and co-morbidity symptoms are monitored and maintained or improved  5/22/2023 1149 by Hanford Riedel, RN  Outcome: Progressing  5/22/2023 0959 by Hanford Riedel, RN  Outcome: Progressing     Problem: Discharge Planning  Goal: Discharge to home or other facility with appropriate resources  5/22/2023 1149 by Hanford Riedel, RN  Outcome: Progressing  5/22/2023 0959 by Hanford Riedel, RN  Outcome: Progressing

## 2023-05-31 ENCOUNTER — TELEPHONE (OUTPATIENT)
Dept: CARDIOLOGY CLINIC | Age: 63
End: 2023-05-31

## 2023-05-31 NOTE — TELEPHONE ENCOUNTER
Left message for patient to contact office. ----- Message from Demarco Richard MD sent at 5/30/2023  4:22 PM EDT -----  Blood work from [facility] looks good, very stable. proBNP was normal.  Kidney function normal.  Glucose in the 200s, follow-up with primary care for that. Otherwise seems like he was doing well at the visit, would continue same and follow-up as scheduled.

## 2023-06-01 NOTE — TELEPHONE ENCOUNTER
Patient returned our call and was given results and recommendations per Dr. Digna Goetz. He verbalized understanding.

## 2023-06-06 ENCOUNTER — OFFICE VISIT (OUTPATIENT)
Dept: CARDIOLOGY CLINIC | Age: 63
End: 2023-06-06
Payer: MEDICARE

## 2023-06-06 VITALS
HEART RATE: 80 BPM | DIASTOLIC BLOOD PRESSURE: 70 MMHG | BODY MASS INDEX: 37.74 KG/M2 | SYSTOLIC BLOOD PRESSURE: 130 MMHG | RESPIRATION RATE: 18 BRPM | HEIGHT: 68 IN | WEIGHT: 249 LBS

## 2023-06-06 DIAGNOSIS — I42.9 CARDIOMYOPATHY, UNSPECIFIED TYPE (HCC): ICD-10-CM

## 2023-06-06 DIAGNOSIS — I50.42 CHRONIC COMBINED SYSTOLIC AND DIASTOLIC CONGESTIVE HEART FAILURE (HCC): ICD-10-CM

## 2023-06-06 DIAGNOSIS — I48.0 PAF (PAROXYSMAL ATRIAL FIBRILLATION) (HCC): ICD-10-CM

## 2023-06-06 DIAGNOSIS — I25.10 CAD IN NATIVE ARTERY: Primary | ICD-10-CM

## 2023-06-06 PROCEDURE — G8417 CALC BMI ABV UP PARAM F/U: HCPCS | Performed by: INTERNAL MEDICINE

## 2023-06-06 PROCEDURE — 99214 OFFICE O/P EST MOD 30 MIN: CPT | Performed by: INTERNAL MEDICINE

## 2023-06-06 PROCEDURE — 93000 ELECTROCARDIOGRAM COMPLETE: CPT | Performed by: INTERNAL MEDICINE

## 2023-06-06 PROCEDURE — 3017F COLORECTAL CA SCREEN DOC REV: CPT | Performed by: INTERNAL MEDICINE

## 2023-06-06 PROCEDURE — G8427 DOCREV CUR MEDS BY ELIG CLIN: HCPCS | Performed by: INTERNAL MEDICINE

## 2023-06-06 PROCEDURE — 1036F TOBACCO NON-USER: CPT | Performed by: INTERNAL MEDICINE

## 2023-06-06 NOTE — PROGRESS NOTES
OUTPATIENT CARDIOLOGY FOLLOW-UP    Name: Briana Bernabe    Age: 58 y.o. Primary Care Physician: Villa Cordova MD    Date of Service: 6/6/2023    Chief Complaint:   Chief Complaint   Patient presents with    Coronary Artery Disease     6 month OV- Patient has no complaints. Interim History:   Here for follow-up of cardiomyopathy. Seen by me as new patient 3/10/2022 office visit for abnormal stress test showing fixed inferior defect with EF in the 20s, and echo showing an EF in the 35s. Heart catheterization 3/16/2022 subsequently revealed multivessel coronary disease and ended up getting stents to the mid LAD, OM 2, and RCA. He was admitted again 3/31/2022 for chest pain and shortness of breath and was treated for decompensated heart failure in the setting of accelerated hypertension and acute pulmonary edema. He was diuresed, GDMT was optimized and was recommended wearable cardioverter defibrillator upon discharge. Repeat echo showed improvement in EF to 40% and vest was discontinued, most recent EF 50-55% on echo. No recent admissions. Feels great. Denies chest pain, shortness breath, palpitations. Review of Systems:   Negative except as described above    Past Medical History:  Past Medical History:   Diagnosis Date    Arthritis     ASK WHERE    Blood disorder     Bone marrow disorder     Bone spur     IN CERVICAL REGION    CAD in native artery 03/16/2022    Cerebral meningioma (HCC)     Cervical radiculopathy     CHF (congestive heart failure) (HCC)     Claustrophobia     Coronary artery disease involving native coronary artery     Diabetes mellitus (HCC)     Disc displacement, lumbar     Enlarged heart     Enlarged prostate     GERD (gastroesophageal reflux disease)     [facility] 01/01/2012    HIGH. .. HAS F/U ON 6/21/12 WITH DR. JIMENEZ    Hernia     Hyperlipidemia     Hypertension     Low back pain     Osteolytic lesion     Panic anxiety syndrome     S/P angioplasty

## 2023-06-19 ENCOUNTER — HOSPITAL ENCOUNTER (OUTPATIENT)
Dept: OTHER | Age: 63
Setting detail: THERAPIES SERIES
Discharge: HOME | End: 2023-06-19
Payer: MEDICARE

## 2023-06-19 VITALS
DIASTOLIC BLOOD PRESSURE: 63 MMHG | BODY MASS INDEX: 38.01 KG/M2 | SYSTOLIC BLOOD PRESSURE: 109 MMHG | OXYGEN SATURATION: 95 % | WEIGHT: 250 LBS | HEART RATE: 82 BPM | RESPIRATION RATE: 16 BRPM

## 2023-06-19 LAB
ANION GAP SERPL CALCULATED.3IONS-SCNC: 12 MMOL/L (ref 7–16)
BNP BLD-MCNC: 217 PG/ML (ref 0–125)
BUN SERPL-MCNC: 13 MG/DL (ref 6–23)
CALCIUM SERPL-MCNC: 9 MG/DL (ref 8.6–10.2)
CHLORIDE SERPL-SCNC: 96 MMOL/L (ref 98–107)
CO2 SERPL-SCNC: 26 MMOL/L (ref 22–29)
CREAT SERPL-MCNC: 0.7 MG/DL (ref 0.7–1.2)
GLUCOSE SERPL-MCNC: 214 MG/DL (ref 74–99)
POTASSIUM SERPL-SCNC: 3.5 MMOL/L (ref 3.5–5)
SODIUM SERPL-SCNC: 134 MMOL/L (ref 132–146)

## 2023-06-19 PROCEDURE — 36415 COLL VENOUS BLD VENIPUNCTURE: CPT

## 2023-06-19 PROCEDURE — 80048 BASIC METABOLIC PNL TOTAL CA: CPT

## 2023-06-19 PROCEDURE — 99214 OFFICE O/P EST MOD 30 MIN: CPT

## 2023-06-19 PROCEDURE — 83880 ASSAY OF NATRIURETIC PEPTIDE: CPT

## 2023-06-19 NOTE — PROGRESS NOTES
Peripheral Vascular  Peripheral Vascular (WDL): Within Defined Limits  RLE Edema: None  LLE Edema: None                   Genitourinary  Genitourinary (WDL): Within Defined Limits    Psychosocial  Psychosocial (WDL): Within Defined Limits                        Pulse: 82                     LAB DATA:    Last 3 BMP      Sodium (mmol/L)   Date Value   06/19/2023 134   05/22/2023 133   04/21/2023 135     Potassium (mmol/L)   Date Value   06/19/2023 3.5   05/22/2023 4.2   04/21/2023 3.6     Potassium reflex Magnesium (mmol/L)   Date Value   08/22/2022 3.8   04/29/2022 4.1   04/02/2022 4.3     Chloride (mmol/L)   Date Value   06/19/2023 96 (L)   05/22/2023 95 (L)   04/21/2023 96 (L)     CO2 (mmol/L)   Date Value   06/19/2023 26   05/22/2023 25   04/21/2023 27     BUN (mg/dL)   Date Value   06/19/2023 13   05/22/2023 16   04/21/2023 13     Glucose (mg/dL)   Date Value   06/19/2023 214 (H)   05/22/2023 210 (H)   04/21/2023 224 (H)   04/21/2011 78   04/17/2011 98   01/05/2011 129 (H)     Calcium (mg/dL)   Date Value   06/19/2023 9.0   05/22/2023 9.8   04/21/2023 8.8       Last 3 BNP       Pro-BNP (pg/mL)   Date Value   06/19/2023 217 (H)   05/22/2023 124   04/21/2023 172 (H)          CBC:   No results for input(s): WBC, HGB, PLT in the last 72 hours. BMP:    Recent Labs     06/19/23  1014      K 3.5   CL 96*   CO2 26   BUN 13   CREATININE 0.7   GLUCOSE 214*                         Hepatic:   No results for input(s): AST, ALT, ALB, BILITOT, ALKPHOS in the last 72 hours. Troponin: No results for input(s): TROPONINI in the last 72 hours. BNP: No results for input(s): BNP in the last 72 hours. Lipids: No results for input(s): CHOL, HDL in the last 72 hours. Invalid input(s): LDLCALCU  INR: No results for input(s): INR in the last 72 hours.         WEIGHTS:    Wt Readings from Last 3 Encounters:   06/19/23 250 lb (113.4 kg)   06/06/23 249 lb (112.9 kg)   05/22/23 250 lb (113.4 kg)

## 2023-06-19 NOTE — PLAN OF CARE
Problem: Chronic Conditions and Co-morbidities  Goal: Patient's chronic conditions and co-morbidity symptoms are monitored and maintained or improved  Outcome: Progressing   Chf-continue plan of care

## 2023-07-10 ENCOUNTER — TELEPHONE (OUTPATIENT)
Dept: CARDIOLOGY CLINIC | Age: 63
End: 2023-07-10

## 2023-09-18 ENCOUNTER — HOSPITAL ENCOUNTER (OUTPATIENT)
Dept: OTHER | Age: 63
Setting detail: THERAPIES SERIES
Discharge: HOME | End: 2023-09-18
Payer: MEDICARE

## 2023-09-18 VITALS
WEIGHT: 251 LBS | OXYGEN SATURATION: 95 % | HEART RATE: 82 BPM | SYSTOLIC BLOOD PRESSURE: 122 MMHG | RESPIRATION RATE: 16 BRPM | DIASTOLIC BLOOD PRESSURE: 55 MMHG | BODY MASS INDEX: 38.16 KG/M2

## 2023-09-18 LAB
ANION GAP SERPL CALCULATED.3IONS-SCNC: 11 MMOL/L (ref 7–16)
BNP SERPL-MCNC: 256 PG/ML (ref 0–125)
BUN SERPL-MCNC: 10 MG/DL (ref 6–23)
CALCIUM SERPL-MCNC: 9.1 MG/DL (ref 8.6–10.2)
CHLORIDE SERPL-SCNC: 97 MMOL/L (ref 98–107)
CO2 SERPL-SCNC: 27 MMOL/L (ref 22–29)
CREAT SERPL-MCNC: 0.7 MG/DL (ref 0.7–1.2)
GFR SERPL CREATININE-BSD FRML MDRD: >60 ML/MIN/1.73M2
GLUCOSE SERPL-MCNC: 194 MG/DL (ref 74–99)
POTASSIUM SERPL-SCNC: 3.9 MMOL/L (ref 3.5–5)
SODIUM SERPL-SCNC: 135 MMOL/L (ref 132–146)

## 2023-09-18 PROCEDURE — 83880 ASSAY OF NATRIURETIC PEPTIDE: CPT

## 2023-09-18 PROCEDURE — 99214 OFFICE O/P EST MOD 30 MIN: CPT

## 2023-09-18 PROCEDURE — 36415 COLL VENOUS BLD VENIPUNCTURE: CPT

## 2023-09-18 PROCEDURE — 80048 BASIC METABOLIC PNL TOTAL CA: CPT

## 2023-09-18 RX ORDER — OMEPRAZOLE 20 MG/1
20 CAPSULE, DELAYED RELEASE ORAL DAILY
COMMUNITY

## 2023-09-18 ASSESSMENT — PATIENT HEALTH QUESTIONNAIRE - PHQ9
SUM OF ALL RESPONSES TO PHQ9 QUESTIONS 1 & 2: 0
2. FEELING DOWN, DEPRESSED OR HOPELESS: NOT AT ALL
1. LITTLE INTEREST OR PLEASURE IN DOING THINGS: NOT AT ALL
2. FEELING DOWN, DEPRESSED OR HOPELESS: NOT AT ALL
SUM OF ALL RESPONSES TO PHQ9 QUESTIONS 1 & 2: 0
1. LITTLE INTEREST OR PLEASURE IN DOING THINGS: NOT AT ALL

## 2023-09-18 NOTE — PROGRESS NOTES
prescribed medications with patient, educated on importance of compliance and answered any questions regarding their medication  Educated on signs and symptoms of HF  Educated on low sodium diet    PLAN:  Future Appointments   Date Time Provider [address] [place] [address]   12/18/2023 10:00 AM [facility] ROOM 1 [facility] Kim HOD          Given clinic phone number [phone] and aware of signs and symptoms to call with any HF change in symptoms.

## 2023-10-02 ENCOUNTER — TELEPHONE (OUTPATIENT)
Dept: CARDIOLOGY CLINIC | Age: 63
End: 2023-10-02

## 2023-10-02 NOTE — TELEPHONE ENCOUNTER
----- Message from Thomas Gleason MD sent at 10/2/2023  1:28 PM EDT -----  Kidney function stable looks normal.  proBNP creeping up slightly, let us know if he feels like he is retaining any fluid, continue to follow low-sodium diet.

## 2024-01-10 ENCOUNTER — TELEPHONE (OUTPATIENT)
Dept: CARDIOLOGY CLINIC | Age: 64
End: 2024-01-10

## 2024-01-10 NOTE — TELEPHONE ENCOUNTER
----- Message from Osman Ballesteros MD sent at 1/9/2024  5:34 PM EST -----  Blood work looks good, continue same and follow-up as scheduled

## 2024-04-10 ENCOUNTER — HOSPITAL ENCOUNTER (OUTPATIENT)
Dept: ULTRASOUND IMAGING | Age: 64
Discharge: HOME | End: 2024-04-12
Payer: MEDICARE

## 2024-04-10 ENCOUNTER — HOSPITAL ENCOUNTER (OUTPATIENT)
Age: 64
Discharge: HOME | End: 2024-04-12
Payer: MEDICARE

## 2024-04-10 DIAGNOSIS — D48.7 NEOPLASM OF UNCERTAIN BEHAVIOR OF OTHER SPECIFIED SITES: ICD-10-CM

## 2024-04-10 PROCEDURE — 76999 ECHO EXAMINATION PROCEDURE: CPT

## 2024-04-19 ENCOUNTER — HOSPITAL ENCOUNTER (OUTPATIENT)
Dept: OTHER | Age: 64
Setting detail: THERAPIES SERIES
Discharge: HOME | End: 2024-04-19
Payer: MEDICARE

## 2024-04-19 VITALS
DIASTOLIC BLOOD PRESSURE: 57 MMHG | SYSTOLIC BLOOD PRESSURE: 114 MMHG | HEART RATE: 85 BPM | OXYGEN SATURATION: 97 % | WEIGHT: 248 LBS | RESPIRATION RATE: 16 BRPM | BODY MASS INDEX: 37.71 KG/M2

## 2024-04-19 LAB
ANION GAP SERPL CALCULATED.3IONS-SCNC: 11 MMOL/L (ref 7–16)
BNP SERPL-MCNC: 170 PG/ML (ref 0–125)
BUN SERPL-MCNC: 10 MG/DL (ref 6–23)
CALCIUM SERPL-MCNC: 9.3 MG/DL (ref 8.6–10.2)
CHLORIDE SERPL-SCNC: 100 MMOL/L (ref 98–107)
CO2 SERPL-SCNC: 23 MMOL/L (ref 22–29)
CREAT SERPL-MCNC: 0.7 MG/DL (ref 0.7–1.2)
GFR SERPL CREATININE-BSD FRML MDRD: >90 ML/MIN/1.73M2
GLUCOSE SERPL-MCNC: 175 MG/DL (ref 74–99)
POTASSIUM SERPL-SCNC: 3.9 MMOL/L (ref 3.5–5)
SODIUM SERPL-SCNC: 134 MMOL/L (ref 132–146)

## 2024-04-19 PROCEDURE — 80048 BASIC METABOLIC PNL TOTAL CA: CPT

## 2024-04-19 PROCEDURE — 83880 ASSAY OF NATRIURETIC PEPTIDE: CPT

## 2024-04-19 PROCEDURE — 36415 COLL VENOUS BLD VENIPUNCTURE: CPT

## 2024-04-19 PROCEDURE — 99214 OFFICE O/P EST MOD 30 MIN: CPT

## 2024-04-19 ASSESSMENT — PATIENT HEALTH QUESTIONNAIRE - PHQ9
SUM OF ALL RESPONSES TO PHQ QUESTIONS 1-9: 0
SUM OF ALL RESPONSES TO PHQ9 QUESTIONS 1 & 2: 0
1. LITTLE INTEREST OR PLEASURE IN DOING THINGS: NOT AT ALL
2. FEELING DOWN, DEPRESSED OR HOPELESS: NOT AT ALL
SUM OF ALL RESPONSES TO PHQ QUESTIONS 1-9: 0

## 2024-04-19 NOTE — PROGRESS NOTES
Congestive Heart Failure Clinic   [facility]      Referring Provider:  VAZQUEZ Patterson-CNP  Primary Care Physician: Edvin Smalls MD   Cardiologist: Dr. Ballesteros  Nephrologist:       HISTORY OF PRESENT ILLNESS:     Johnathan Lan is a 63 y.o. (1960) male with a history of HFimpEF  Pre Cupid:     Lab Results   Component Value Date    LVEF 53 05/04/2023     Post Cupid:  No results found for: \"EFBP\"      He presents to the CHF clinic for ongoing evaluation and monitoring of heart failure.    In the CHF clinic today he denies any adverse symptoms except:  [] Dizziness or lightheadedness   [] Syncope or near syncope  [] Recent Fall  [] Shortness of breath at rest   [] Dyspnea with exertion  [] Decline in functional capacity (unable to perform activities they had previously been able to do)  [] Fatigue   [] Orthopnea  [] PND  [] Nocturnal cough  [] Hemoptysis  [] Chest pain, pressure, or discomfort  [] Palpitations  [] Abdominal distention  [] Abdominal bloating  [] Early satiety  [] Blood in stool   [] Diarrhea  [] Constipation  [] Nausea/Vomiting  [] Decreased urinary response to oral diuretic   [] Scrotal swelling   [] Lower extremity edema  [] Used PRN doses of oral diuretic   [] Weight gain    Wt Readings from Last 3 Encounters:   04/19/24 112.5 kg (248 lb)   01/16/24 113.4 kg (250 lb)   12/19/23 114.3 kg (252 lb)           SOCIAL HISTORY:  [x] Denies tobacco, alcohol or illicit drug abuse  [] Tobacco use:  [] ETOH use:  [] Illicit drug use:        MEDICATIONS:    Allergies   Allergen Reactions    Adhesive Tape Rash     Prior to Visit Medications    Medication Sig Taking? Authorizing Provider   bumetanide (BUMEX) 1 MG tablet Take 1 tablet by mouth daily  Patient taking differently: Take 0.5 tablets by mouth 2 times daily  Osman Ballesteros MD   Misc Natural Products (PUMPKIN SEED OIL PO) Take 1,000 mLs by mouth daily  Provider, MD Juanita

## 2024-04-22 ENCOUNTER — TELEPHONE (OUTPATIENT)
Dept: CARDIOLOGY CLINIC | Age: 64
End: 2024-04-22

## 2024-04-22 NOTE — TELEPHONE ENCOUNTER
----- Message from Osman Ballesteros MD sent at 4/21/2024  1:44 PM EDT -----  Labs good cont same f/u as scheduled

## 2024-06-05 ENCOUNTER — HOSPITAL ENCOUNTER (OUTPATIENT)
Age: 64
Discharge: HOME | End: 2024-06-05
Payer: MEDICARE

## 2024-06-05 DIAGNOSIS — D32.0 CEREBRAL MENINGIOMA (HCC): ICD-10-CM

## 2024-06-05 LAB
BUN SERPL-MCNC: 9 MG/DL (ref 6–23)
CREAT SERPL-MCNC: 0.8 MG/DL (ref 0.7–1.2)
GFR, ESTIMATED: >90 ML/MIN/1.73M2

## 2024-06-05 PROCEDURE — 36415 COLL VENOUS BLD VENIPUNCTURE: CPT

## 2024-06-05 PROCEDURE — 82565 ASSAY OF CREATININE: CPT

## 2024-06-05 PROCEDURE — 84520 ASSAY OF UREA NITROGEN: CPT

## 2024-06-11 ENCOUNTER — HOSPITAL ENCOUNTER (OUTPATIENT)
Age: 64
Discharge: HOME | End: 2024-06-11
Payer: MEDICARE

## 2024-06-11 DIAGNOSIS — D53.9 MACROCYTIC ANEMIA: ICD-10-CM

## 2024-06-11 DIAGNOSIS — D75.0 FAMILIAL POLYCYTHEMIA: ICD-10-CM

## 2024-06-11 LAB
ANION GAP SERPL CALCULATED.3IONS-SCNC: 11 MMOL/L (ref 7–16)
BASOPHILS # BLD: 0.01 K/UL (ref 0–0.2)
BASOPHILS NFR BLD: 0 % (ref 0–2)
BUN SERPL-MCNC: 11 MG/DL (ref 6–23)
CALCIUM SERPL-MCNC: 9.2 MG/DL (ref 8.6–10.2)
CHLORIDE SERPL-SCNC: 94 MMOL/L (ref 98–107)
CO2 SERPL-SCNC: 27 MMOL/L (ref 22–29)
CREAT SERPL-MCNC: 0.8 MG/DL (ref 0.7–1.2)
EOSINOPHIL # BLD: 0.06 K/UL (ref 0.05–0.5)
EOSINOPHILS RELATIVE PERCENT: 2 % (ref 0–6)
ERYTHROCYTE [DISTWIDTH] IN BLOOD BY AUTOMATED COUNT: 16.8 % (ref 11.5–15)
GFR, ESTIMATED: >90 ML/MIN/1.73M2
GLUCOSE SERPL-MCNC: 215 MG/DL (ref 74–99)
HCT VFR BLD AUTO: 27.1 % (ref 37–54)
HGB BLD-MCNC: 9 G/DL (ref 12.5–16.5)
IMM GRANULOCYTES # BLD AUTO: <0.03 K/UL (ref 0–0.58)
IMM GRANULOCYTES NFR BLD: 0 % (ref 0–5)
INR PPP: 1.6
LYMPHOCYTES NFR BLD: 1.23 K/UL (ref 1.5–4)
LYMPHOCYTES RELATIVE PERCENT: 40 % (ref 20–42)
MCH RBC QN AUTO: 36 PG (ref 26–35)
MCHC RBC AUTO-ENTMCNC: 33.2 G/DL (ref 32–34.5)
MCV RBC AUTO: 108.4 FL (ref 80–99.9)
MONOCYTES NFR BLD: 0.34 K/UL (ref 0.1–0.95)
MONOCYTES NFR BLD: 11 % (ref 2–12)
NEUTROPHILS NFR BLD: 46 % (ref 43–80)
NEUTS SEG NFR BLD: 1.4 K/UL (ref 1.8–7.3)
PLATELET # BLD AUTO: 252 K/UL (ref 130–450)
PMV BLD AUTO: 11.1 FL (ref 7–12)
POTASSIUM SERPL-SCNC: 3.7 MMOL/L (ref 3.5–5)
PROTHROMBIN TIME: 17.6 SEC (ref 9.3–12.4)
RBC # BLD AUTO: 2.5 M/UL (ref 3.8–5.8)
SODIUM SERPL-SCNC: 132 MMOL/L (ref 132–146)
WBC OTHER # BLD: 3.1 K/UL (ref 4.5–11.5)

## 2024-06-11 PROCEDURE — 85610 PROTHROMBIN TIME: CPT

## 2024-06-11 PROCEDURE — 85025 COMPLETE CBC W/AUTO DIFF WBC: CPT

## 2024-06-11 PROCEDURE — 36415 COLL VENOUS BLD VENIPUNCTURE: CPT

## 2024-06-11 PROCEDURE — 80048 BASIC METABOLIC PNL TOTAL CA: CPT

## 2024-06-14 ENCOUNTER — HOSPITAL ENCOUNTER (OUTPATIENT)
Dept: INTERVENTIONAL RADIOLOGY/VASCULAR | Age: 64
End: 2024-06-14
Payer: MEDICARE

## 2024-06-14 VITALS
HEART RATE: 87 BPM | SYSTOLIC BLOOD PRESSURE: 141 MMHG | RESPIRATION RATE: 16 BRPM | DIASTOLIC BLOOD PRESSURE: 62 MMHG | OXYGEN SATURATION: 98 % | TEMPERATURE: 97.4 F

## 2024-06-14 DIAGNOSIS — D64.9 ANEMIA, UNSPECIFIED TYPE: ICD-10-CM

## 2024-06-14 DIAGNOSIS — D45: ICD-10-CM

## 2024-06-14 DIAGNOSIS — D75.1 POLYCYTHEMIA: ICD-10-CM

## 2024-06-14 PROCEDURE — 36415 COLL VENOUS BLD VENIPUNCTURE: CPT | Performed by: RADIOLOGY

## 2024-06-14 PROCEDURE — 7100000010 HC PHASE II RECOVERY - FIRST 15 MIN: Performed by: RADIOLOGY

## 2024-06-14 PROCEDURE — C1830 POWER BONE MARROW BX NEEDLE: HCPCS

## 2024-06-14 PROCEDURE — 38222 DX BONE MARROW BX & ASPIR: CPT

## 2024-06-14 PROCEDURE — 2500000003 HC RX 250 WO HCPCS: Performed by: RADIOLOGY

## 2024-06-14 PROCEDURE — 6360000002 HC RX W HCPCS: Performed by: RADIOLOGY

## 2024-06-14 PROCEDURE — 77002 NEEDLE LOCALIZATION BY XRAY: CPT

## 2024-06-14 PROCEDURE — 7100000011 HC PHASE II RECOVERY - ADDTL 15 MIN: Performed by: RADIOLOGY

## 2024-06-14 RX ORDER — MIDAZOLAM HYDROCHLORIDE 2 MG/2ML
INJECTION, SOLUTION INTRAMUSCULAR; INTRAVENOUS PRN
Status: COMPLETED | OUTPATIENT
Start: 2024-06-14 | End: 2024-06-14

## 2024-06-14 RX ORDER — FENTANYL CITRATE 50 UG/ML
INJECTION, SOLUTION INTRAMUSCULAR; INTRAVENOUS PRN
Status: COMPLETED | OUTPATIENT
Start: 2024-06-14 | End: 2024-06-14

## 2024-06-14 RX ORDER — LIDOCAINE HYDROCHLORIDE AND EPINEPHRINE BITARTRATE 20; .01 MG/ML; MG/ML
INJECTION, SOLUTION SUBCUTANEOUS PRN
Status: COMPLETED | OUTPATIENT
Start: 2024-06-14 | End: 2024-06-14

## 2024-06-14 RX ADMIN — MIDAZOLAM HYDROCHLORIDE 1 MG: 1 INJECTION, SOLUTION INTRAMUSCULAR; INTRAVENOUS at 08:35

## 2024-06-14 RX ADMIN — FENTANYL CITRATE 50 MCG: 50 INJECTION, SOLUTION INTRAMUSCULAR; INTRAVENOUS at 08:41

## 2024-06-14 RX ADMIN — FENTANYL CITRATE 50 MCG: 50 INJECTION, SOLUTION INTRAMUSCULAR; INTRAVENOUS at 08:36

## 2024-06-14 RX ADMIN — LIDOCAINE HYDROCHLORIDE,EPINEPHRINE BITARTRATE 8 ML: 20; .01 INJECTION, SOLUTION INFILTRATION; PERINEURAL at 08:42

## 2024-06-14 RX ADMIN — MIDAZOLAM HYDROCHLORIDE 1 MG: 1 INJECTION, SOLUTION INTRAMUSCULAR; INTRAVENOUS at 08:40

## 2024-06-14 RX ADMIN — FENTANYL CITRATE 100 MCG: 50 INJECTION, SOLUTION INTRAMUSCULAR; INTRAVENOUS at 08:46

## 2024-06-14 NOTE — PRE SEDATION
PRE-SEDATION ASSESSMENT NOTE    Patient Name: Johnathan Lan   Medical Record Number: [id number]  Date: 6/14/24   Time: 3:22 PM EDT   Room/Bed: Room/bed info not found  Admitting Diagnosis: <principal problem not specified>    1. HISTORY & PHYSICAL EXAMINATION:  Comments: none    Vitals:    06/14/24 0930   BP: (!) 141/62   Pulse: 87   Resp: 16   Temp:    SpO2: 98%       Allergies: Adhesive tape    2. Heart and Lungs immediately prior to procedure demonstrate no contraindications to proceed    Drug: no  Tobacco: unknown    3. PAST ANESTHESIA EXPERIENCE:  No previous History.     4. AIRWAY/TEETH/HEAD & NECK(Mallampati Classification):  II (soft palate, uvula, fauces visible)    5: NORMAL RANGE OF MOTION OF NECK: No    6. PATIENT WILL LIKELY TOLERATE PLAN OF MODERATE SEDATION    7. ASA 2.    Electronically signed by Nelli Singleton MD

## 2024-06-14 NOTE — PROGRESS NOTES
0900-Patient returned from procedure. Dressing checked, clean, dry, and intact. Patient stable. No s/s of complications noted or reported. Vitals will be checked q 15min, see flow sheets.    0905-Patient eating and drinking well with no s/s of complications noted or reported.    0940-Patient discharged, site was checked with every set of vitals. Site clean dry and intact. Discharge papers reviewed with patient, questions answered, discharge paper signed. Patient taken to door via ambulation. Patient in stable condition, no s/s of complications noted or reported.

## 2024-06-14 NOTE — BRIEF OP NOTE
Brief Postoperative Note  ______________________________________________________________       [facility] PROCED[facility]    Patient Name: Johnathan Lan   YOB: 1960  Medical Record Number: [id number]  Date of Procedure: 6/14/24  Room/Bed: Room/bed info not found    Pre-operative Diagnosis and Procedure: BM bx    Post-operative Diagnosis: Same    Consent: INFORMED CONSENT WAS OBTAINED BY patient, RISK AND BENEFITS WERE DISCUSSED.     Anesthesia: Local anesthesia with approximately 10 mL of 1% Lidocaine without epinephrine administered subcutaneously administered subcutaneously. intravenous sedation.    Estimated Blood Loss: < 10 cc    Performed by: Nelli Singleton MD.    Complications: none    Specimen obtained: BM    Findings: none    Electronically signed by Nelli Singleton MD   DD: 6/14/24  3:23 PM

## 2024-06-14 NOTE — OR NURSING
Sterile, woven gauze and clear transparent applied to Left hip.    The dressing application occurred in a well-lit room with strict adherence to aseptic technique. Procedure site revealed no signs of hematoma or complications.    The next dressing change and wound assessment should occur in 24 hours. No submersion in water for 72 hours post procedure.

## 2024-06-14 NOTE — PROGRESS NOTES
Patient arrived via ambulation with family to Radiology department for bone marrow bx. Allergies, home medications, H&P and fasting instructions reviewed with patient. Vital signs taken. 20g IV placed, IV flushed and prn adapter attached. Procedural instructions given, questions answered, understanding expressed and consent signed. Patient given fluoroscopy education, no questions at this time.

## 2024-07-03 ENCOUNTER — OFFICE VISIT (OUTPATIENT)
Dept: CARDIOLOGY CLINIC | Age: 64
End: 2024-07-03
Payer: MEDICARE

## 2024-07-03 VITALS
SYSTOLIC BLOOD PRESSURE: 122 MMHG | DIASTOLIC BLOOD PRESSURE: 64 MMHG | HEART RATE: 83 BPM | RESPIRATION RATE: 17 BRPM | BODY MASS INDEX: 36.83 KG/M2 | WEIGHT: 243 LBS | HEIGHT: 68 IN

## 2024-07-03 DIAGNOSIS — I50.42 CHRONIC COMBINED SYSTOLIC AND DIASTOLIC CONGESTIVE HEART FAILURE (HCC): ICD-10-CM

## 2024-07-03 DIAGNOSIS — G47.33 OSA (OBSTRUCTIVE SLEEP APNEA): ICD-10-CM

## 2024-07-03 DIAGNOSIS — E66.01 CLASS 2 SEVERE OBESITY DUE TO EXCESS CALORIES WITH SERIOUS COMORBIDITY AND BODY MASS INDEX (BMI) OF 36.0 TO 36.9 IN ADULT (HCC): ICD-10-CM

## 2024-07-03 DIAGNOSIS — I48.0 PAF (PAROXYSMAL ATRIAL FIBRILLATION) (HCC): ICD-10-CM

## 2024-07-03 DIAGNOSIS — I25.10 CORONARY ARTERY DISEASE INVOLVING NATIVE CORONARY ARTERY OF NATIVE HEART WITHOUT ANGINA PECTORIS: Primary | ICD-10-CM

## 2024-07-03 LAB — BONE MARROW REPORT: NORMAL

## 2024-07-03 PROCEDURE — 1036F TOBACCO NON-USER: CPT | Performed by: INTERNAL MEDICINE

## 2024-07-03 PROCEDURE — 99214 OFFICE O/P EST MOD 30 MIN: CPT | Performed by: INTERNAL MEDICINE

## 2024-07-03 PROCEDURE — 93000 ELECTROCARDIOGRAM COMPLETE: CPT | Performed by: INTERNAL MEDICINE

## 2024-07-03 PROCEDURE — G8427 DOCREV CUR MEDS BY ELIG CLIN: HCPCS | Performed by: INTERNAL MEDICINE

## 2024-07-03 PROCEDURE — G8417 CALC BMI ABV UP PARAM F/U: HCPCS | Performed by: INTERNAL MEDICINE

## 2024-07-03 PROCEDURE — 3017F COLORECTAL CA SCREEN DOC REV: CPT | Performed by: INTERNAL MEDICINE

## 2024-07-03 RX ORDER — CYANOCOBALAMIN 1000 UG/ML
1000 INJECTION, SOLUTION INTRAMUSCULAR; SUBCUTANEOUS
COMMUNITY

## 2024-07-03 NOTE — PROGRESS NOTES
LVEF has progressively improved.    TTE 6/30/22   Summary   Left ventricle is borderline dilated. LVEDD 5.8 cm.   LV systolic function is moderately reduced.   Ejection fraction is visually estimated at 40%.   Grade I diastolic dysfunction.   No regional wall motion abnormalities seen.   Mild left ventricular concentric hypertrophy noted.   Normal right ventricular size and function.   No significant valvular abnormalities.      Compared to studies from 1/2022 and 4/2022, LVEF has improved.    TTE 1/21/22 [facility]       Summary   difficult visualization. Contrast used.   moderate global hypokinesis. Inferior akinesis. EF 30%   Severely reduced left ventricular systolic function.   Stage I diastolic dysfunction.   Trace to mild mitral regurgitation.    Limited TTE 4/1/2022 [facility]  Dilated LV, global hypokinesis with posterior akinesis  EF 30%  Mild MR    Stress test:    Pharmacologic stress 12/13/2021  Gated SPECT left ventricular ejection fraction was calculated to be 25%, with inferior hypokinesis.     Impression:    ECG during the infusion did not change.   The myocardial perfusion imaging was abnormal.    The abnormality was a a large sized fixed defect in the inferior wall suggestive of a prior MI     Overall left ventricular systolic function was abnormal with regional wall motion abnormalities.  Intermediate risk general pharmacologic stress test.      Cardiac catheterization:   C/PCI 3/16/22 [facility]      IFR of the ostial LAD: 0.95, 0.95, 0.94.     Angiographic Results/findings:  Left Main: No angiographically significant stenosis.  LAD: Ostial diffuse eccentric 50% stenosis.  Mid long diffuse 80% stenosis.  D1: Mild luminal irregularities.  D2: No angiographically significant stenosis..  Cx: Codominant vessel.  No angiographically significant stenosis..  OM1: Large vessel.  Mid mild luminal irregularities.  There is a small lateral branch that is a 1.8 to 2 mm vessel.  This has an ostial 99% subtotal

## 2024-08-05 ENCOUNTER — HOSPITAL ENCOUNTER (OUTPATIENT)
Dept: OTHER | Age: 64
Setting detail: THERAPIES SERIES
Discharge: HOME | End: 2024-08-05
Payer: MEDICARE

## 2024-08-05 VITALS
RESPIRATION RATE: 16 BRPM | SYSTOLIC BLOOD PRESSURE: 125 MMHG | HEART RATE: 78 BPM | DIASTOLIC BLOOD PRESSURE: 57 MMHG | OXYGEN SATURATION: 98 % | BODY MASS INDEX: 36.64 KG/M2 | WEIGHT: 241 LBS

## 2024-08-05 LAB
ANION GAP SERPL CALCULATED.3IONS-SCNC: 10 MMOL/L (ref 7–16)
BNP SERPL-MCNC: 222 PG/ML (ref 0–125)
BUN SERPL-MCNC: 13 MG/DL (ref 6–23)
CALCIUM SERPL-MCNC: 9.2 MG/DL (ref 8.6–10.2)
CHLORIDE SERPL-SCNC: 100 MMOL/L (ref 98–107)
CO2 SERPL-SCNC: 25 MMOL/L (ref 22–29)
CREAT SERPL-MCNC: 0.8 MG/DL (ref 0.7–1.2)
GFR, ESTIMATED: >90 ML/MIN/1.73M2
GLUCOSE SERPL-MCNC: 110 MG/DL (ref 74–99)
POTASSIUM SERPL-SCNC: 4.3 MMOL/L (ref 3.5–5)
SODIUM SERPL-SCNC: 135 MMOL/L (ref 132–146)

## 2024-08-05 PROCEDURE — 80048 BASIC METABOLIC PNL TOTAL CA: CPT

## 2024-08-05 PROCEDURE — 83880 ASSAY OF NATRIURETIC PEPTIDE: CPT

## 2024-08-05 PROCEDURE — 99214 OFFICE O/P EST MOD 30 MIN: CPT

## 2024-08-05 PROCEDURE — 36415 COLL VENOUS BLD VENIPUNCTURE: CPT

## 2024-08-05 ASSESSMENT — PATIENT HEALTH QUESTIONNAIRE - PHQ9
SUM OF ALL RESPONSES TO PHQ QUESTIONS 1-9: 0
SUM OF ALL RESPONSES TO PHQ QUESTIONS 1-9: 0
1. LITTLE INTEREST OR PLEASURE IN DOING THINGS: NOT AT ALL
SUM OF ALL RESPONSES TO PHQ QUESTIONS 1-9: 0
SUM OF ALL RESPONSES TO PHQ9 QUESTIONS 1 & 2: 0
SUM OF ALL RESPONSES TO PHQ QUESTIONS 1-9: 0
2. FEELING DOWN, DEPRESSED OR HOPELESS: NOT AT ALL

## 2024-11-06 ENCOUNTER — HOSPITAL ENCOUNTER (OUTPATIENT)
Dept: OTHER | Age: 64
Setting detail: THERAPIES SERIES
Discharge: HOME | End: 2024-11-06
Payer: MEDICARE

## 2024-11-06 VITALS
WEIGHT: 240 LBS | BODY MASS INDEX: 36.49 KG/M2 | HEART RATE: 87 BPM | SYSTOLIC BLOOD PRESSURE: 114 MMHG | DIASTOLIC BLOOD PRESSURE: 55 MMHG | RESPIRATION RATE: 16 BRPM | OXYGEN SATURATION: 97 %

## 2024-11-06 LAB
ANION GAP SERPL CALCULATED.3IONS-SCNC: 11 MMOL/L (ref 7–16)
BNP SERPL-MCNC: 398 PG/ML (ref 0–125)
BUN SERPL-MCNC: 14 MG/DL (ref 6–23)
CALCIUM SERPL-MCNC: 9.5 MG/DL (ref 8.6–10.2)
CHLORIDE SERPL-SCNC: 97 MMOL/L (ref 98–107)
CO2 SERPL-SCNC: 25 MMOL/L (ref 22–29)
CREAT SERPL-MCNC: 0.8 MG/DL (ref 0.7–1.2)
GFR, ESTIMATED: >90 ML/MIN/1.73M2
GLUCOSE SERPL-MCNC: 197 MG/DL (ref 74–99)
POTASSIUM SERPL-SCNC: 4.3 MMOL/L (ref 3.5–5)
SODIUM SERPL-SCNC: 133 MMOL/L (ref 132–146)

## 2024-11-06 PROCEDURE — 99214 OFFICE O/P EST MOD 30 MIN: CPT

## 2024-11-06 PROCEDURE — 83880 ASSAY OF NATRIURETIC PEPTIDE: CPT

## 2024-11-06 PROCEDURE — 36415 COLL VENOUS BLD VENIPUNCTURE: CPT

## 2024-11-06 PROCEDURE — 80048 BASIC METABOLIC PNL TOTAL CA: CPT

## 2024-11-06 ASSESSMENT — PATIENT HEALTH QUESTIONNAIRE - PHQ9
SUM OF ALL RESPONSES TO PHQ QUESTIONS 1-9: 0
SUM OF ALL RESPONSES TO PHQ QUESTIONS 1-9: 0
SUM OF ALL RESPONSES TO PHQ9 QUESTIONS 1 & 2: 0
SUM OF ALL RESPONSES TO PHQ QUESTIONS 1-9: 0
2. FEELING DOWN, DEPRESSED OR HOPELESS: NOT AT ALL
SUM OF ALL RESPONSES TO PHQ QUESTIONS 1-9: 0
1. LITTLE INTEREST OR PLEASURE IN DOING THINGS: NOT AT ALL

## 2024-11-06 NOTE — PROGRESS NOTES
Congestive Heart Failure Clinic   [place]      Referring Provider:  VAZQUEZ Patterson-CNP  Primary Care Physician: Edvin Smalls MD   Cardiologist: Dr. Ballesteros  Nephrologist:       HISTORY OF PRESENT ILLNESS:     Johnathan Lan is a 64 y.o. (1960) male with a history of HFimpEF  Pre Cupid:     Lab Results   Component Value Date    LVEF 53 05/04/2023     Post Cupid:  No results found for: \"EFBP\"      He presents to the CHF clinic for ongoing evaluation and monitoring of heart failure.    In the CHF clinic today he denies any adverse symptoms except:  [] Dizziness or lightheadedness   [] Syncope or near syncope  [] Recent Fall  [] Shortness of breath at rest   [] Dyspnea with exertion  [] Decline in functional capacity (unable to perform activities they had previously been able to do)  [] Fatigue   [] Orthopnea  [] PND  [] Nocturnal cough  [] Hemoptysis  [] Chest pain, pressure, or discomfort  [] Palpitations  [] Abdominal distention  [] Abdominal bloating  [] Early satiety  [] Blood in stool   [] Diarrhea  [] Constipation  [] Nausea/Vomiting  [] Decreased urinary response to oral diuretic   [] Scrotal swelling   [] Lower extremity edema  [] Used PRN doses of oral diuretic   [] Weight gain    Wt Readings from Last 3 Encounters:   11/06/24 108.9 kg (240 lb)   08/05/24 109.3 kg (241 lb)   07/03/24 110.2 kg (243 lb)           SOCIAL HISTORY:  [x] Denies tobacco, alcohol or illicit drug abuse  [] Tobacco use:  [] ETOH use:  [] Illicit drug use:        MEDICATIONS:    Allergies   Allergen Reactions    Adhesive Tape Rash     Prior to Visit Medications    Medication Sig Taking? Authorizing Provider   cyanocobalamin 1000 MCG/ML injection Inject 1 mL into the muscle every 30 days Yes Provider, MD Juanita   bumetanide (BUMEX) 1 MG tablet Take 1 tablet by mouth daily  Patient taking differently: Take 0.5 tablets by mouth 2 times daily Yes Osman Ballesteros

## 2025-02-11 ENCOUNTER — HOSPITAL ENCOUNTER (OUTPATIENT)
Dept: OTHER | Age: 65
Setting detail: THERAPIES SERIES
Discharge: HOME | End: 2025-02-11
Payer: MEDICARE

## 2025-02-11 VITALS
RESPIRATION RATE: 18 BRPM | BODY MASS INDEX: 35.82 KG/M2 | WEIGHT: 235.6 LBS | OXYGEN SATURATION: 97 % | DIASTOLIC BLOOD PRESSURE: 57 MMHG | SYSTOLIC BLOOD PRESSURE: 109 MMHG | HEART RATE: 69 BPM

## 2025-02-11 LAB
ANION GAP SERPL CALCULATED.3IONS-SCNC: 10 MMOL/L (ref 7–16)
BNP SERPL-MCNC: 444 PG/ML (ref 0–125)
BUN SERPL-MCNC: 10 MG/DL (ref 6–23)
CALCIUM SERPL-MCNC: 9 MG/DL (ref 8.6–10.2)
CHLORIDE SERPL-SCNC: 98 MMOL/L (ref 98–107)
CO2 SERPL-SCNC: 28 MMOL/L (ref 22–29)
CREAT SERPL-MCNC: 0.8 MG/DL (ref 0.7–1.2)
EKG ATRIAL RATE: 73 BPM
EKG P AXIS: -7 DEGREES
EKG P-R INTERVAL: 170 MS
EKG Q-T INTERVAL: 396 MS
EKG QRS DURATION: 84 MS
EKG QTC CALCULATION (BAZETT): 436 MS
EKG R AXIS: 5 DEGREES
EKG T AXIS: 43 DEGREES
EKG VENTRICULAR RATE: 73 BPM
GFR, ESTIMATED: >90 ML/MIN/1.73M2
GLUCOSE SERPL-MCNC: 135 MG/DL (ref 74–99)
POTASSIUM SERPL-SCNC: 3.4 MMOL/L (ref 3.5–5)
SODIUM SERPL-SCNC: 136 MMOL/L (ref 132–146)

## 2025-02-11 PROCEDURE — 99214 OFFICE O/P EST MOD 30 MIN: CPT

## 2025-02-11 PROCEDURE — 80048 BASIC METABOLIC PNL TOTAL CA: CPT

## 2025-02-11 PROCEDURE — 36415 COLL VENOUS BLD VENIPUNCTURE: CPT

## 2025-02-11 PROCEDURE — 83880 ASSAY OF NATRIURETIC PEPTIDE: CPT

## 2025-02-11 PROCEDURE — 93005 ELECTROCARDIOGRAM TRACING: CPT | Performed by: INTERNAL MEDICINE

## 2025-02-11 ASSESSMENT — PATIENT HEALTH QUESTIONNAIRE - PHQ9
1. LITTLE INTEREST OR PLEASURE IN DOING THINGS: NOT AT ALL
SUM OF ALL RESPONSES TO PHQ QUESTIONS 1-9: 0
2. FEELING DOWN, DEPRESSED OR HOPELESS: NOT AT ALL
SUM OF ALL RESPONSES TO PHQ QUESTIONS 1-9: 0
SUM OF ALL RESPONSES TO PHQ9 QUESTIONS 1 & 2: 0

## 2025-02-11 NOTE — RESULT ENCOUNTER NOTE
Potassium running a bit low.  Add KCl 20 mEq daily for 7 days.  Can increase intake potassium rich foods.

## 2025-02-11 NOTE — PROGRESS NOTES
Congestive Heart Failure Clinic   [facility]      Referring Provider:  VAZQUEZ Patterson-CNP  Primary Care Physician: Edvin Smalls MD   Cardiologist: Dr. Ballesteros  Nephrologist:       HISTORY OF PRESENT ILLNESS:     Johnathan Lan is a 64 y.o. (1960) male with a history of HFimpEF  Pre Cupid:     Lab Results   Component Value Date    LVEF 53 05/04/2023     Post Cupid:  No results found for: \"EFBP\"      He presents to the CHF clinic for ongoing evaluation and monitoring of heart failure.    In the CHF clinic today he denies any adverse symptoms except:  [] Dizziness or lightheadedness   [] Syncope or near syncope  [] Recent Fall  [] Shortness of breath at rest   [x] Dyspnea with exertion - recovers with rest   [] Decline in functional capacity (unable to perform activities they had previously been able to do)  [] Fatigue   [] Orthopnea  [] PND  [] Nocturnal cough  [] Hemoptysis  [] Chest pain, pressure, or discomfort  [] Palpitations  [] Abdominal distention  [] Abdominal bloating  [] Early satiety  [] Blood in stool   [] Diarrhea  [] Constipation  [] Nausea/Vomiting  [] Decreased urinary response to oral diuretic   [] Scrotal swelling   [] Lower extremity edema  [] Used PRN doses of oral diuretic   [] Weight gain    Wt Readings from Last 3 Encounters:   02/11/25 106.9 kg (235 lb 9.6 oz)   11/06/24 108.9 kg (240 lb)   08/05/24 109.3 kg (241 lb)           SOCIAL HISTORY:  [x] Denies tobacco, alcohol or illicit drug abuse  [] Tobacco use:  [] ETOH use:  [] Illicit drug use:        MEDICATIONS:    Allergies   Allergen Reactions    Adhesive Tape Rash     Prior to Visit Medications    Medication Sig Taking? Authorizing Provider   cyanocobalamin 1000 MCG/ML injection Inject 1 mL into the muscle every 30 days Yes Provider, MD Juanita   bumetanide (BUMEX) 1 MG tablet Take 1 tablet by mouth daily  Patient taking differently: Take 0.5 tablets by mouth 2

## 2025-02-12 ENCOUNTER — TELEPHONE (OUTPATIENT)
Dept: CARDIOLOGY CLINIC | Age: 65
End: 2025-02-12

## 2025-02-12 RX ORDER — POTASSIUM CHLORIDE 1.5 G/1.58G
20 POWDER, FOR SOLUTION ORAL 2 TIMES DAILY
COMMUNITY
End: 2025-02-12 | Stop reason: CLARIF

## 2025-02-12 RX ORDER — POTASSIUM CHLORIDE 1.5 G/1.58G
20 POWDER, FOR SOLUTION ORAL 2 TIMES DAILY
COMMUNITY
End: 2025-02-12 | Stop reason: SDUPTHER

## 2025-02-12 RX ORDER — POTASSIUM CHLORIDE 1500 MG/1
20 TABLET, EXTENDED RELEASE ORAL DAILY
Qty: 7 TABLET | Refills: 0 | Status: SHIPPED | OUTPATIENT
Start: 2025-02-12 | End: 2025-02-19

## 2025-02-12 NOTE — TELEPHONE ENCOUNTER
----- Message from Dr. Osman Ballesteros MD sent at 2/11/2025 12:48 PM EST -----  Potassium running a bit low.  Add KCl 20 mEq daily for 7 days.  Can increase intake potassium rich foods.

## 2025-02-12 NOTE — TELEPHONE ENCOUNTER
Spoke with patient and advised of dr recommendations; pt verbalized understanding e-scribed [facility] MeQ x7days

## 2025-05-20 ENCOUNTER — TELEPHONE (OUTPATIENT)
Dept: CARDIOLOGY CLINIC | Age: 65
End: 2025-05-20

## 2025-05-20 ENCOUNTER — HOSPITAL ENCOUNTER (OUTPATIENT)
Dept: OTHER | Age: 65
Setting detail: THERAPIES SERIES
Discharge: HOME | End: 2025-05-20
Payer: MEDICARE

## 2025-05-20 VITALS
SYSTOLIC BLOOD PRESSURE: 128 MMHG | HEART RATE: 73 BPM | RESPIRATION RATE: 18 BRPM | WEIGHT: 235 LBS | BODY MASS INDEX: 35.73 KG/M2 | OXYGEN SATURATION: 98 % | DIASTOLIC BLOOD PRESSURE: 58 MMHG

## 2025-05-20 LAB
ANION GAP SERPL CALCULATED.3IONS-SCNC: 9 MMOL/L (ref 7–16)
BNP SERPL-MCNC: 772 PG/ML (ref 0–125)
BUN SERPL-MCNC: 11 MG/DL (ref 6–23)
CALCIUM SERPL-MCNC: 9.3 MG/DL (ref 8.6–10.2)
CHLORIDE SERPL-SCNC: 100 MMOL/L (ref 98–107)
CO2 SERPL-SCNC: 26 MMOL/L (ref 22–29)
CREAT SERPL-MCNC: 1 MG/DL (ref 0.7–1.2)
GFR, ESTIMATED: 85 ML/MIN/1.73M2
GLUCOSE SERPL-MCNC: 106 MG/DL (ref 74–99)
POTASSIUM SERPL-SCNC: 3.9 MMOL/L (ref 3.5–5)
SODIUM SERPL-SCNC: 135 MMOL/L (ref 132–146)

## 2025-05-20 PROCEDURE — 36415 COLL VENOUS BLD VENIPUNCTURE: CPT

## 2025-05-20 PROCEDURE — 99214 OFFICE O/P EST MOD 30 MIN: CPT

## 2025-05-20 PROCEDURE — 83880 ASSAY OF NATRIURETIC PEPTIDE: CPT

## 2025-05-20 PROCEDURE — 80048 BASIC METABOLIC PNL TOTAL CA: CPT

## 2025-05-20 ASSESSMENT — PATIENT HEALTH QUESTIONNAIRE - PHQ9
2. FEELING DOWN, DEPRESSED OR HOPELESS: NOT AT ALL
SUM OF ALL RESPONSES TO PHQ QUESTIONS 1-9: 0
SUM OF ALL RESPONSES TO PHQ QUESTIONS 1-9: 0
1. LITTLE INTEREST OR PLEASURE IN DOING THINGS: NOT AT ALL
SUM OF ALL RESPONSES TO PHQ QUESTIONS 1-9: 0
SUM OF ALL RESPONSES TO PHQ QUESTIONS 1-9: 0

## 2025-05-20 NOTE — PROGRESS NOTES
Congestive Heart Failure Clinic   [facility]      Referring Provider:  VAZQUEZ Patterson-CNP  Primary Care Physician: Edvin Smalls MD   Cardiologist: Dr. Ballesteros  Nephrologist:     HISTORY OF PRESENT ILLNESS:     Johnathan Lan is a 64 y.o. (1960) male with a history of HFimpEF  Pre Cupid:     Lab Results   Component Value Date    LVEF 53 05/04/2023     Post Cupid:  No results found for: \"EFBP\"      He presents to the CHF clinic for ongoing evaluation and monitoring of heart failure.    In the CHF clinic today he denies any adverse symptoms except:  [] Dizziness or lightheadedness   [] Syncope or near syncope  [] Recent Fall  [] Shortness of breath at rest   [x] Dyspnea with exertion  [] Decline in functional capacity (unable to perform activities they had previously been able to do)  [x] Fatigue   [] Orthopnea  [] PND  [] Nocturnal cough  [] Hemoptysis  [] Chest pain, pressure, or discomfort  [] Palpitations  [] Abdominal distention  [] Abdominal bloating  [] Early satiety  [] Blood in stool   [] Diarrhea  [] Constipation  [] Nausea/Vomiting  [] Decreased urinary response to oral diuretic   [] Scrotal swelling   [] Lower extremity edema  [] Used PRN doses of oral diuretic   [] Weight gain    Wt Readings from Last 3 Encounters:   05/20/25 106.6 kg (235 lb)   02/11/25 106.9 kg (235 lb 9.6 oz)   11/06/24 108.9 kg (240 lb)           SOCIAL HISTORY:  [x] Denies tobacco, alcohol or illicit drug abuse  [] Tobacco use:  [] ETOH use:  [] Illicit drug use:        MEDICATIONS:    Allergies   Allergen Reactions    Adhesive Tape Rash     Prior to Visit Medications    Medication Sig Taking? Authorizing Provider   cyanocobalamin 1000 MCG/ML injection Inject 1 mL into the muscle every 30 days Yes Provider, MD Juanita   bumetanide (BUMEX) 1 MG tablet Take 1 tablet by mouth daily  Patient taking differently: Take 0.5 tablets by mouth 2 times daily Yes Favian

## 2025-08-05 ENCOUNTER — HOSPITAL ENCOUNTER (OUTPATIENT)
Dept: OTHER | Age: 65
Setting detail: THERAPIES SERIES
Discharge: HOME | End: 2025-08-05
Payer: MEDICARE

## 2025-08-05 VITALS
OXYGEN SATURATION: 98 % | HEART RATE: 82 BPM | BODY MASS INDEX: 36.16 KG/M2 | DIASTOLIC BLOOD PRESSURE: 58 MMHG | WEIGHT: 237.8 LBS | RESPIRATION RATE: 18 BRPM | SYSTOLIC BLOOD PRESSURE: 134 MMHG

## 2025-08-05 LAB
ANION GAP SERPL CALCULATED.3IONS-SCNC: 12 MMOL/L (ref 7–16)
BNP SERPL-MCNC: 548 PG/ML (ref 0–125)
BUN SERPL-MCNC: 12 MG/DL (ref 8–23)
CALCIUM SERPL-MCNC: 9.2 MG/DL (ref 8.8–10.2)
CHLORIDE SERPL-SCNC: 100 MMOL/L (ref 98–107)
CO2 SERPL-SCNC: 24 MMOL/L (ref 22–29)
CREAT SERPL-MCNC: 0.9 MG/DL (ref 0.7–1.2)
GFR, ESTIMATED: >90 ML/MIN/1.73M2
GLUCOSE SERPL-MCNC: 165 MG/DL (ref 74–99)
POTASSIUM SERPL-SCNC: 4 MMOL/L (ref 3.5–5.1)
SODIUM SERPL-SCNC: 135 MMOL/L (ref 136–145)

## 2025-08-05 PROCEDURE — 80048 BASIC METABOLIC PNL TOTAL CA: CPT

## 2025-08-05 PROCEDURE — 2500000003 HC RX 250 WO HCPCS: Performed by: INTERNAL MEDICINE

## 2025-08-05 PROCEDURE — 6360000002 HC RX W HCPCS

## 2025-08-05 PROCEDURE — 99214 OFFICE O/P EST MOD 30 MIN: CPT

## 2025-08-05 PROCEDURE — 96374 THER/PROPH/DIAG INJ IV PUSH: CPT

## 2025-08-05 PROCEDURE — 36415 COLL VENOUS BLD VENIPUNCTURE: CPT

## 2025-08-05 PROCEDURE — 83880 ASSAY OF NATRIURETIC PEPTIDE: CPT

## 2025-08-05 RX ORDER — BUMETANIDE 0.25 MG/ML
2 INJECTION, SOLUTION INTRAMUSCULAR; INTRAVENOUS ONCE
Status: COMPLETED | OUTPATIENT
Start: 2025-08-05 | End: 2025-08-05

## 2025-08-05 RX ORDER — BUMETANIDE 0.25 MG/ML
INJECTION, SOLUTION INTRAMUSCULAR; INTRAVENOUS
Status: COMPLETED
Start: 2025-08-05 | End: 2025-08-05

## 2025-08-05 RX ORDER — SODIUM CHLORIDE 0.9 % (FLUSH) 0.9 %
10 SYRINGE (ML) INJECTION ONCE
Status: COMPLETED | OUTPATIENT
Start: 2025-08-05 | End: 2025-08-05

## 2025-08-05 RX ADMIN — SODIUM CHLORIDE, PRESERVATIVE FREE 10 ML: 5 INJECTION INTRAVENOUS at 10:23

## 2025-08-05 RX ADMIN — BUMETANIDE 2 MG: 0.25 INJECTION INTRAMUSCULAR; INTRAVENOUS at 10:22

## 2025-08-05 RX ADMIN — BUMETANIDE 2 MG: 0.25 INJECTION, SOLUTION INTRAMUSCULAR; INTRAVENOUS at 10:22

## 2025-08-05 ASSESSMENT — PATIENT HEALTH QUESTIONNAIRE - PHQ9
SUM OF ALL RESPONSES TO PHQ QUESTIONS 1-9: 1
2. FEELING DOWN, DEPRESSED OR HOPELESS: SEVERAL DAYS
SUM OF ALL RESPONSES TO PHQ QUESTIONS 1-9: 1
SUM OF ALL RESPONSES TO PHQ QUESTIONS 1-9: 1
1. LITTLE INTEREST OR PLEASURE IN DOING THINGS: NOT AT ALL
SUM OF ALL RESPONSES TO PHQ QUESTIONS 1-9: 1

## 2025-08-26 ENCOUNTER — OFFICE VISIT (OUTPATIENT)
Dept: CARDIOLOGY CLINIC | Age: 65
End: 2025-08-26
Payer: MEDICARE

## 2025-08-26 VITALS
TEMPERATURE: 97 F | WEIGHT: 234.1 LBS | OXYGEN SATURATION: 97 % | HEIGHT: 68 IN | SYSTOLIC BLOOD PRESSURE: 128 MMHG | RESPIRATION RATE: 18 BRPM | HEART RATE: 77 BPM | BODY MASS INDEX: 35.48 KG/M2 | DIASTOLIC BLOOD PRESSURE: 68 MMHG

## 2025-08-26 DIAGNOSIS — I25.10 CORONARY ARTERY DISEASE INVOLVING NATIVE CORONARY ARTERY OF NATIVE HEART WITHOUT ANGINA PECTORIS: Primary | ICD-10-CM

## 2025-08-26 DIAGNOSIS — I48.0 PAF (PAROXYSMAL ATRIAL FIBRILLATION) (HCC): ICD-10-CM

## 2025-08-26 DIAGNOSIS — R06.02 SHORTNESS OF BREATH: ICD-10-CM

## 2025-08-26 DIAGNOSIS — R06.09 DOE (DYSPNEA ON EXERTION): ICD-10-CM

## 2025-08-26 DIAGNOSIS — I50.32 HEART FAILURE WITH RECOVERED EJECTION FRACTION (HFRECEF) (HCC): ICD-10-CM

## 2025-08-26 PROCEDURE — 99214 OFFICE O/P EST MOD 30 MIN: CPT

## 2025-08-26 PROCEDURE — 1123F ACP DISCUSS/DSCN MKR DOCD: CPT

## 2025-08-26 PROCEDURE — 93000 ELECTROCARDIOGRAM COMPLETE: CPT
